# Patient Record
Sex: FEMALE | Race: WHITE | Employment: FULL TIME | URBAN - METROPOLITAN AREA
[De-identification: names, ages, dates, MRNs, and addresses within clinical notes are randomized per-mention and may not be internally consistent; named-entity substitution may affect disease eponyms.]

---

## 2019-05-19 PROBLEM — N83.291 COMPLEX CYST OF RIGHT OVARY: Status: ACTIVE | Noted: 2019-05-19

## 2019-05-19 PROBLEM — D06.9 CIN III (CERVICAL INTRAEPITHELIAL NEOPLASIA III): Status: ACTIVE | Noted: 2019-05-19

## 2019-05-19 NOTE — PROGRESS NOTES
[address] [place] [address] [address], [address] [facility] [phone]  F [phone]    Office Note  Patient ID:  Name:  Armida Goodman  MRN:  [id number]  :  1965/53 y.o. Date:  2019      HISTORY OF PRESENT ILLNESS:  Armida Goodman is a 48 y.o.  perimenopausal female who is being seen for at least sever cervical dysplasia. She is referred by Dr. Sienna Frias. Her most recent pap smear was read as HGSIL and she was high-risk HPV positive. She underwent subsequent colposcopy with biopsy. This revealed ZBIGNIEW 2-3, but an invasive process could not be excluded. On a pelvic ultrasound she was noted to have a complex 4.1 cm right adnexal mass and a 3.7 cm complex mass in the cervix. I have been asked to see her in consultation for further evaluation and management. ROS:   and GI review:  Negative  Cardiopulmonary review:  Negative   Musculoskeletal:  Negative    A comprehensive review of systems was negative except for that written in the History of Present Illness. , 10 point ROS      OB/GYN ROS:  There is no history of significant gyn problems or procedures. Problem List:  Patient Active Problem List    Diagnosis Date Noted    Malignant neoplasm of overlapping sites of cervix ([facility] [address] 2019    Complex cyst of right ovary 2019     PMH:  Past Medical History:   Diagnosis Date    Rheumatoid arthritis ([facility] [address]       PSH:  Past Surgical History:   Procedure Laterality Date    HX COLPOSCOPY  2019    HGSIL      Social History:  Social History     Tobacco Use    Smoking status: Former Smoker    Smokeless tobacco: Never Used    Tobacco comment: Patient reports she smoked in her 21's   Substance Use Topics    Alcohol use: Not on file      Family History:  History reviewed. No pertinent family history.    Medications: (reviewed)  Current Outpatient Medications   Medication Sig    naproxen (NAPROSYN) 500 mg tablet TAKE 1 TABLET BY MOUTH EVERY 8 HOURS FOR 48 HOURS     No current facility-administered medications for this visit. Allergies: (reviewed)  No Known Allergies       OBJECTIVE:    Physical Exam:  VITAL SIGNS: Vitals:    05/20/19 0900   BP: 133/81   Pulse: 80   Weight: 182 lb 12.8 oz (82.9 kg)   Height: 5' 3\" (1.6 m)     Body mass index is 32.38 kg/m². GENERAL FAUSTINA: Conversant, alert, oriented. No acute distress. HEENT: HEENT. No thyroid enlargement. No JVD. Neck: Supple without restrictions. RESPIRATORY: Clear to auscultation and percussion to the bases. No CVAT. CARDIOVASC: RRR without murmur/rub. GASTROINT: soft, non-tender, without masses or organomegaly   MUSCULOSKEL: no joint tenderness, deformity or swelling   EXTREMITIES: extremities normal, atraumatic, no cyanosis or edema   PELVIC: Vulva and distal vagina appear normal. Cervix replaced by tumor and flush with vaginal apex. Necrotic appearing with foul-smelling discharge. Palpable tumor involvement of the anterior vaginal wall, more so on the right side. Thickening of the parametrium bilaterally. Uterus not mobile. Possible nodularity along the uterosacral ligament and cul de sac. Adnexa not palpable. Exam was a bit limited due to discomfort. RECTAL: Deferred   KEIRY SURVEY: No suspicious lymphadenopathy or edema noted. NEURO: Grossly intact. No acute deficit. Lab Data:    No results found for: WBC, HGB, HCT, PLT, MCV, HGBEXT, HCTEXT, PLTEXT, HGBEXT, HCTEXT, PLTEXT  No results found for: NA, K, CL, CO2, AGAP, GLU, BUN, CREA, BUCR, GFRAA, GFRNA, CA      Imaging: Outside pelvic ultrasound reviewed. Pertinent findings noted in HPI. IMPRESSION/PLAN:  Keisha Singh is a 48 y.o. female with a biopsy diagnosis of at least ZBIGNIEW 2-3, but clearly has an invasive carcinoma on physical exam.  She also has a complex right adnexal mass. I reviewed with Keisha Singh her medical records, physical exam, and review of symptoms.   I explained to the patient and her  my clinical findings. She has at least a stage IIB cervical cancer, possibly stage IIIB. Based upon my exam she is not a candidate for a radical hysterectomy. She will need chemoradiation, unless she is found to have distant metastatic disease. I am going to send her for a PET/CT to assess for metastatic disease. I am also going to plan an exam under anesthesia with cystoscopy, proctoscopy, and another cervical biopsy to confirm the diagnosis. She was counseled on the risks, benefits, indications, and alternatives of the CKC procedure. Her questions were answered and she wishes to proceed as planned. She was quite tearful in the office today when given the news.         Signed By: Linda Hutton MD     5/20/2019/9:50 AM

## 2019-05-20 ENCOUNTER — OFFICE VISIT (OUTPATIENT)
Dept: GYNECOLOGY | Age: 54
End: 2019-05-20

## 2019-05-20 ENCOUNTER — HOSPITAL ENCOUNTER (OUTPATIENT)
Dept: PREADMISSION TESTING | Age: 54
Discharge: HOME | End: 2019-05-20
Payer: COMMERCIAL

## 2019-05-20 VITALS
HEART RATE: 76 BPM | HEIGHT: 62 IN | DIASTOLIC BLOOD PRESSURE: 85 MMHG | BODY MASS INDEX: 33.19 KG/M2 | SYSTOLIC BLOOD PRESSURE: 132 MMHG | RESPIRATION RATE: 20 BRPM | WEIGHT: 180.38 LBS | TEMPERATURE: 98.2 F

## 2019-05-20 VITALS
WEIGHT: 182.8 LBS | DIASTOLIC BLOOD PRESSURE: 81 MMHG | HEART RATE: 80 BPM | SYSTOLIC BLOOD PRESSURE: 133 MMHG | HEIGHT: 63 IN | BODY MASS INDEX: 32.39 KG/M2

## 2019-05-20 DIAGNOSIS — N83.291 COMPLEX CYST OF RIGHT OVARY: ICD-10-CM

## 2019-05-20 DIAGNOSIS — C53.8 MALIGNANT NEOPLASM OF OVERLAPPING SITES OF CERVIX (HCC): Primary | ICD-10-CM

## 2019-05-20 LAB
ALBUMIN SERPL-MCNC: 3.4 G/DL (ref 3.5–5)
ALBUMIN/GLOB SERPL: 0.8 {RATIO} (ref 1.1–2.2)
ALP SERPL-CCNC: 78 U/L (ref 45–117)
ALT SERPL-CCNC: 13 U/L (ref 12–78)
ANION GAP SERPL CALC-SCNC: 8 MMOL/L (ref 5–15)
AST SERPL-CCNC: 18 U/L (ref 15–37)
BASOPHILS # BLD: 0 K/UL (ref 0–0.1)
BASOPHILS NFR BLD: 0 % (ref 0–1)
BILIRUB SERPL-MCNC: 0.3 MG/DL (ref 0.2–1)
BUN SERPL-MCNC: 8 MG/DL (ref 6–20)
BUN/CREAT SERPL: 16 (ref 12–20)
CALCIUM SERPL-MCNC: 9 MG/DL (ref 8.5–10.1)
CHLORIDE SERPL-SCNC: 106 MMOL/L (ref 97–108)
CO2 SERPL-SCNC: 27 MMOL/L (ref 21–32)
CREAT SERPL-MCNC: 0.49 MG/DL (ref 0.55–1.02)
DIFFERENTIAL METHOD BLD: ABNORMAL
EOSINOPHIL # BLD: 0.1 K/UL (ref 0–0.4)
EOSINOPHIL NFR BLD: 2 % (ref 0–7)
ERYTHROCYTE [DISTWIDTH] IN BLOOD BY AUTOMATED COUNT: 14.8 % (ref 11.5–14.5)
GLOBULIN SER CALC-MCNC: 4.1 G/DL (ref 2–4)
GLUCOSE SERPL-MCNC: 86 MG/DL (ref 65–100)
HCT VFR BLD AUTO: 35.4 % (ref 35–47)
HGB BLD-MCNC: 11.2 G/DL (ref 11.5–16)
IMM GRANULOCYTES # BLD AUTO: 0 K/UL (ref 0–0.04)
IMM GRANULOCYTES NFR BLD AUTO: 0 % (ref 0–0.5)
LYMPHOCYTES # BLD: 1 K/UL (ref 0.8–3.5)
LYMPHOCYTES NFR BLD: 19 % (ref 12–49)
MCH RBC QN AUTO: 27.5 PG (ref 26–34)
MCHC RBC AUTO-ENTMCNC: 31.6 G/DL (ref 30–36.5)
MCV RBC AUTO: 86.8 FL (ref 80–99)
MONOCYTES # BLD: 0.4 K/UL (ref 0–1)
MONOCYTES NFR BLD: 8 % (ref 5–13)
NEUTS SEG # BLD: 3.8 K/UL (ref 1.8–8)
NEUTS SEG NFR BLD: 71 % (ref 32–75)
NRBC # BLD: 0 K/UL (ref 0–0.01)
NRBC BLD-RTO: 0 PER 100 WBC
PLATELET # BLD AUTO: 257 K/UL (ref 150–400)
PMV BLD AUTO: 10.1 FL (ref 8.9–12.9)
POTASSIUM SERPL-SCNC: 4 MMOL/L (ref 3.5–5.1)
PROT SERPL-MCNC: 7.5 G/DL (ref 6.4–8.2)
RBC # BLD AUTO: 4.08 M/UL (ref 3.8–5.2)
SODIUM SERPL-SCNC: 141 MMOL/L (ref 136–145)
WBC # BLD AUTO: 5.3 K/UL (ref 3.6–11)

## 2019-05-20 PROCEDURE — 80053 COMPREHEN METABOLIC PANEL: CPT

## 2019-05-20 PROCEDURE — 85025 COMPLETE CBC W/AUTO DIFF WBC: CPT

## 2019-05-20 RX ORDER — NAPROXEN 500 MG/1
TABLET ORAL
Refills: 0 | COMMUNITY
Start: 2019-05-08 | End: 2020-06-10

## 2019-05-20 RX ORDER — IBUPROFEN 200 MG
400 TABLET ORAL
COMMUNITY

## 2019-05-20 NOTE — PROGRESS NOTES
New patient referred by Dr. Gary Mccrary for carcinoma in situ of the cervix and right adnexal mass.

## 2019-05-20 NOTE — PATIENT INSTRUCTIONS
[employer] Activation    Thank you for requesting access to [employer]. Please follow the instructions below to securely access and download your online medical record. [employer] allows you to send messages to your doctor, view your test results, renew your prescriptions, schedule appointments, and more. How Do I Sign Up? 1. In your internet browser, go to [email or web address]. 2. Click on the First Time User? Click Here link in the Sign In box. You will see the New Member Sign Up page. 3. Enter your [employer] Access Code exactly as it appears below. You will not need to use this code after youve completed the sign-up process. If you do not sign up before the expiration date, you must request a new code. [employer] Access Code: [email or web address]  Expires: 2019  8:53 AM (This is the date your [employer] access code will )    4. Enter the last four digits of your Social Security Number (xxxx) and Date of Birth (mm/dd/yyyy) as indicated and click Submit. You will be taken to the next sign-up page. 5. Create a [employer] ID. This will be your [employer] login ID and cannot be changed, so think of one that is secure and easy to remember. 6. Create a [employer] password. You can change your password at any time. 7. Enter your Password Reset Question and Answer. This can be used at a later time if you forget your password. 8. Enter your e-mail address. You will receive e-mail notification when new information is available in [address] 9. Click Sign Up. You can now view and download portions of your medical record. 10. Click the Download Summary menu link to download a portable copy of your medical information. Additional Information    If you have questions, please visit the Frequently Asked Questions section of the [employer] website at [email or web address] Remember, [employer] is NOT to be used for urgent needs. For medical emergencies, dial 911.

## 2019-05-20 NOTE — LETTER
2019 10:20 AM 
 
Patient:  Dwayne Robert YOB: 1965 Date of Visit: 2019 Dear Ovidio Arias MD 
[address] [facility] [address] [place] [address] VIA Facsimile: [phone] 
 : Thank you for referring Ms. Dwayne Robert to me for evaluation/treatment. Below are the relevant portions of my assessment and plan of care. [address], [address] [facility], [address] 
P [phone]  F [phone] Office Note Patient ID: 
Name:  Dwayne Robert MRN:  [id number] :  1965/53 y.o. Date:  2019 HISTORY OF PRESENT ILLNESS: 
Dwayne Robert is a 48 y.o.  perimenopausal female who is being seen for at least sever cervical dysplasia. She is referred by Dr. Zac Grossman. Her most recent pap smear was read as HGSIL and she was high-risk HPV positive. She underwent subsequent colposcopy with biopsy. This revealed ZBIGNIEW 2-3, but an invasive process could not be excluded. On a pelvic ultrasound she was noted to have a complex 4.1 cm right adnexal mass and a 3.7 cm complex mass in the cervix. I have been asked to see her in consultation for further evaluation and management. ROS: 
 and GI review:  Negative Cardiopulmonary review:  Negative Musculoskeletal:  Negative A comprehensive review of systems was negative except for that written in the History of Present Illness. , 10 point ROS 
 
 
OB/GYN ROS: 
There is no history of significant gyn problems or procedures. Problem List: 
Patient Active Problem List  
 Diagnosis Date Noted  Malignant neoplasm of overlapping sites of cervix ([address] 2019  Complex cyst of right ovary 2019 PMH: 
Past Medical History:  
Diagnosis Date  Rheumatoid arthritis ([address] PSH: 
Past Surgical History:  
Procedure Laterality Date  HX COLPOSCOPY  2019 HGSIL Social History: 
Social History Tobacco Use  
  Smoking status: Former Smoker  Smokeless tobacco: Never Used  Tobacco comment: Patient reports she smoked in her 21's Substance Use Topics  Alcohol use: Not on file Family History: 
History reviewed. No pertinent family history. Medications: (reviewed) Current Outpatient Medications Medication Sig  
 naproxen (NAPROSYN) 500 mg tablet TAKE 1 TABLET BY MOUTH EVERY 8 HOURS FOR 48 HOURS No current facility-administered medications for this visit. Allergies: (reviewed) No Known Allergies OBJECTIVE: 
 
Physical Exam: VITAL SIGNS: Vitals:  
 05/20/19 0900 BP: 133/81 Pulse: 80 Weight: 182 lb 12.8 oz (82.9 kg) Height: 5' 3\" (1.6 m) Body mass index is 32.38 kg/m². GENERAL FAUSTINA: Conversant, alert, oriented. No acute distress. HEENT: HEENT. No thyroid enlargement. No JVD. Neck: Supple without restrictions. RESPIRATORY: Clear to auscultation and percussion to the bases. No CVAT. CARDIOVASC: RRR without murmur/rub. GASTROINT: soft, non-tender, without masses or organomegaly MUSCULOSKEL: no joint tenderness, deformity or swelling EXTREMITIES: extremities normal, atraumatic, no cyanosis or edema PELVIC: Vulva and distal vagina appear normal. Cervix replaced by tumor and flush with vaginal apex. Necrotic appearing with foul-smelling discharge. Palpable tumor involvement of the anterior vaginal wall, more so on the right side. Thickening of the parametrium bilaterally. Uterus not mobile. Possible nodularity along the uterosacral ligament and cul de sac. Adnexa not palpable. Exam was a bit limited due to discomfort. RECTAL: Deferred KEIRY SURVEY: No suspicious lymphadenopathy or edema noted. NEURO: Grossly intact. No acute deficit. Lab Data: 
 
No results found for: WBC, HGB, HCT, PLT, MCV, HGBEXT, HCTEXT, PLTEXT, HGBEXT, HCTEXT, PLTEXT No results found for: NA, K, CL, CO2, AGAP, GLU, BUN, CREA, BUCR, GFRAA, GFRNA, CA Imaging: Outside pelvic ultrasound reviewed. Pertinent findings noted in HPI. IMPRESSION/PLAN: 
Esteban Canela is a 48 y.o. female with a biopsy diagnosis of at least ZBIGNIEW 2-3, but clearly has an invasive carcinoma on physical exam.  She also has a complex right adnexal mass. I reviewed with Esteban Canela her medical records, physical exam, and review of symptoms. I explained to the patient and her  my clinical findings. She has at least a stage IIB cervical cancer, possibly stage IIIB. Based upon my exam she is not a candidate for a radical hysterectomy. She will need chemoradiation, unless she is found to have distant metastatic disease. I am going to send her for a PET/CT to assess for metastatic disease. I am also going to plan an exam under anesthesia with cystoscopy, proctoscopy, and another cervical biopsy to confirm the diagnosis. She was counseled on the risks, benefits, indications, and alternatives of the CKC procedure. Her questions were answered and she wishes to proceed as planned. She was quite tearful in the office today when given the news. Signed By: Kelly Stone MD   
 5/20/2019/9:50 AM  
 
 
New patient referred by Dr. Christine Francis for carcinoma in situ of the cervix and right adnexal mass. If you have questions, please do not hesitate to call me. I look forward to following Ms. Lanie along with you.  
 
 
 
Sincerely, 
 
 
Kelly Stone MD

## 2019-05-21 ENCOUNTER — HOSPITAL ENCOUNTER (OUTPATIENT)
Dept: PET IMAGING | Age: 54
Discharge: HOME | End: 2019-05-21
Attending: OBSTETRICS & GYNECOLOGY
Payer: COMMERCIAL

## 2019-05-21 VITALS — BODY MASS INDEX: 32.25 KG/M2 | WEIGHT: 182 LBS | HEIGHT: 63 IN

## 2019-05-21 DIAGNOSIS — C53.8 MALIGNANT NEOPLASM OF OVERLAPPING SITES OF CERVIX (HCC): ICD-10-CM

## 2019-05-21 DIAGNOSIS — N83.291 COMPLEX CYST OF RIGHT OVARY: ICD-10-CM

## 2019-05-21 PROCEDURE — A9552 F18 FDG: HCPCS

## 2019-05-21 RX ORDER — SODIUM CHLORIDE 0.9 % (FLUSH) 0.9 %
10 SYRINGE (ML) INJECTION
Status: COMPLETED | OUTPATIENT
Start: 2019-05-21 | End: 2019-05-21

## 2019-05-21 RX ADMIN — Medication 10 ML: at 07:56

## 2019-05-22 ENCOUNTER — HOSPITAL ENCOUNTER (OUTPATIENT)
Age: 54
Setting detail: OUTPATIENT SURGERY
Discharge: HOME | End: 2019-05-22
Attending: OBSTETRICS & GYNECOLOGY | Admitting: OBSTETRICS & GYNECOLOGY
Payer: COMMERCIAL

## 2019-05-22 ENCOUNTER — ANESTHESIA (OUTPATIENT)
Dept: SURGERY | Age: 54
End: 2019-05-22
Payer: COMMERCIAL

## 2019-05-22 ENCOUNTER — ANESTHESIA EVENT (OUTPATIENT)
Dept: SURGERY | Age: 54
End: 2019-05-22
Payer: COMMERCIAL

## 2019-05-22 VITALS
OXYGEN SATURATION: 97 % | WEIGHT: 180 LBS | HEIGHT: 62 IN | BODY MASS INDEX: 33.13 KG/M2 | RESPIRATION RATE: 11 BRPM | SYSTOLIC BLOOD PRESSURE: 113 MMHG | DIASTOLIC BLOOD PRESSURE: 72 MMHG | HEART RATE: 67 BPM | TEMPERATURE: 97.7 F

## 2019-05-22 DIAGNOSIS — C53.8 MALIGNANT NEOPLASM OF OVERLAPPING SITES OF CERVIX (HCC): Primary | ICD-10-CM

## 2019-05-22 DIAGNOSIS — G89.3 CANCER ASSOCIATED PAIN: ICD-10-CM

## 2019-05-22 LAB — HCG UR QL: NEGATIVE

## 2019-05-22 PROCEDURE — 74011250636 HC RX REV CODE- 250/636

## 2019-05-22 PROCEDURE — 74011000258 HC RX REV CODE- 258: Performed by: OBSTETRICS & GYNECOLOGY

## 2019-05-22 PROCEDURE — 76060000032 HC ANESTHESIA 0.5 TO 1 HR: Performed by: OBSTETRICS & GYNECOLOGY

## 2019-05-22 PROCEDURE — 76010000138 HC OR TIME 0.5 TO 1 HR: Performed by: OBSTETRICS & GYNECOLOGY

## 2019-05-22 PROCEDURE — 76210000016 HC OR PH I REC 1 TO 1.5 HR: Performed by: OBSTETRICS & GYNECOLOGY

## 2019-05-22 PROCEDURE — 76210000021 HC REC RM PH II 0.5 TO 1 HR: Performed by: OBSTETRICS & GYNECOLOGY

## 2019-05-22 PROCEDURE — 77030018832 HC SOL IRR H20 ICUM -A: Performed by: OBSTETRICS & GYNECOLOGY

## 2019-05-22 PROCEDURE — 77030020782 HC GWN BAIR PAWS FLX 3M -B

## 2019-05-22 PROCEDURE — 74011250636 HC RX REV CODE- 250/636: Performed by: ANESTHESIOLOGY

## 2019-05-22 PROCEDURE — 81025 URINE PREGNANCY TEST: CPT

## 2019-05-22 PROCEDURE — 74011000250 HC RX REV CODE- 250: Performed by: OBSTETRICS & GYNECOLOGY

## 2019-05-22 PROCEDURE — 77030019927 HC TBNG IRR CYSTO BAXT -A: Performed by: OBSTETRICS & GYNECOLOGY

## 2019-05-22 PROCEDURE — 77030020143 HC AIRWY LARYN INTUB CGAS -A: Performed by: ANESTHESIOLOGY

## 2019-05-22 PROCEDURE — 77030018836 HC SOL IRR NACL ICUM -A: Performed by: OBSTETRICS & GYNECOLOGY

## 2019-05-22 PROCEDURE — 88305 TISSUE EXAM BY PATHOLOGIST: CPT

## 2019-05-22 RX ORDER — DEXAMETHASONE SODIUM PHOSPHATE 4 MG/ML
INJECTION, SOLUTION INTRA-ARTICULAR; INTRALESIONAL; INTRAMUSCULAR; INTRAVENOUS; SOFT TISSUE AS NEEDED
Status: DISCONTINUED | OUTPATIENT
Start: 2019-05-22 | End: 2019-05-22 | Stop reason: HOSPADM

## 2019-05-22 RX ORDER — MIDAZOLAM HYDROCHLORIDE 1 MG/ML
INJECTION, SOLUTION INTRAMUSCULAR; INTRAVENOUS AS NEEDED
Status: DISCONTINUED | OUTPATIENT
Start: 2019-05-22 | End: 2019-05-22 | Stop reason: HOSPADM

## 2019-05-22 RX ORDER — FERRIC SUBSULFATE 20-22G/100
SOLUTION, NON-ORAL MISCELLANEOUS AS NEEDED
Status: DISCONTINUED | OUTPATIENT
Start: 2019-05-22 | End: 2019-05-22 | Stop reason: HOSPADM

## 2019-05-22 RX ORDER — OXYCODONE AND ACETAMINOPHEN 5; 325 MG/1; MG/1
1 TABLET ORAL
Qty: 30 TAB | Refills: 0 | Status: SHIPPED | OUTPATIENT
Start: 2019-05-22 | End: 2019-05-25

## 2019-05-22 RX ORDER — ONDANSETRON 2 MG/ML
INJECTION INTRAMUSCULAR; INTRAVENOUS AS NEEDED
Status: DISCONTINUED | OUTPATIENT
Start: 2019-05-22 | End: 2019-05-22 | Stop reason: HOSPADM

## 2019-05-22 RX ORDER — LIDOCAINE HYDROCHLORIDE 20 MG/ML
INJECTION, SOLUTION EPIDURAL; INFILTRATION; INTRACAUDAL; PERINEURAL AS NEEDED
Status: DISCONTINUED | OUTPATIENT
Start: 2019-05-22 | End: 2019-05-22 | Stop reason: HOSPADM

## 2019-05-22 RX ORDER — SODIUM CHLORIDE, SODIUM LACTATE, POTASSIUM CHLORIDE, CALCIUM CHLORIDE 600; 310; 30; 20 MG/100ML; MG/100ML; MG/100ML; MG/100ML
25 INJECTION, SOLUTION INTRAVENOUS CONTINUOUS
Status: DISCONTINUED | OUTPATIENT
Start: 2019-05-22 | End: 2019-05-22 | Stop reason: HOSPADM

## 2019-05-22 RX ORDER — PROPOFOL 10 MG/ML
INJECTION, EMULSION INTRAVENOUS AS NEEDED
Status: DISCONTINUED | OUTPATIENT
Start: 2019-05-22 | End: 2019-05-22 | Stop reason: HOSPADM

## 2019-05-22 RX ADMIN — MIDAZOLAM HYDROCHLORIDE 2 MG: 1 INJECTION, SOLUTION INTRAMUSCULAR; INTRAVENOUS at 13:26

## 2019-05-22 RX ADMIN — PROPOFOL 200 MG: 10 INJECTION, EMULSION INTRAVENOUS at 13:34

## 2019-05-22 RX ADMIN — CEFOTETAN DISODIUM 2 G: 2 INJECTION, POWDER, FOR SOLUTION INTRAMUSCULAR; INTRAVENOUS at 13:37

## 2019-05-22 RX ADMIN — LIDOCAINE HYDROCHLORIDE 100 MG: 20 INJECTION, SOLUTION EPIDURAL; INFILTRATION; INTRACAUDAL; PERINEURAL at 13:34

## 2019-05-22 RX ADMIN — ONDANSETRON 4 MG: 2 INJECTION INTRAMUSCULAR; INTRAVENOUS at 13:40

## 2019-05-22 RX ADMIN — SODIUM CHLORIDE, SODIUM LACTATE, POTASSIUM CHLORIDE, AND CALCIUM CHLORIDE 25 ML/HR: 600; 310; 30; 20 INJECTION, SOLUTION INTRAVENOUS at 12:12

## 2019-05-22 RX ADMIN — DEXAMETHASONE SODIUM PHOSPHATE 8 MG: 4 INJECTION, SOLUTION INTRA-ARTICULAR; INTRALESIONAL; INTRAMUSCULAR; INTRAVENOUS; SOFT TISSUE at 13:40

## 2019-05-22 NOTE — BRIEF OP NOTE
BRIEF OPERATIVE NOTE    Date of Procedure: 5/22/2019   Preoperative Diagnosis: CERVICAL CANCER  Postoperative Diagnosis: CERVICAL CANCER    Procedure(s): EXAM UNDER ANESTHESIA, CYSTOSCOPY, PROCTOSCOPY, CERVICAL BIOPSY  Surgeon(s) and Role:     Hernandez Morillo MD - Primary         Surgical Assistant: None    Surgical Staff:  Circ-1: Martín Fournier RN  Circ-2: All Cortes RN  Scrub Tech-1: Jackelinea Herb  Event Time In Time Out   Incision Start 1346    Incision Close       Anesthesia: General   Estimated Blood Loss: 25 cc  Specimens:   ID Type Source Tests Collected by Time Destination   1 : cervical biopsy Fresh Cervical  Garrick Suarez MD 5/22/2019 1356 Pathology      Findings: Replacement of cervix with carcinoma, with extension into proximal anterior vagina. Parametrial thickening bilaterally. Uterus still somewhat mobile. No appreciable disease in the bladder or rectum.      Complications: None  Implants: * No implants in log *    Lauren Deleon MD

## 2019-05-22 NOTE — H&P
[address] [facility] [address], [address]   [phone]  F [phone]    Office Note  Patient ID:  Name:  Jena Deutsch  MRN:  [id number]  :  1965/53 y.o. Date:  2019      HISTORY OF PRESENT ILLNESS:  Jena Deutsch is a 48 y.o.  perimenopausal female who is being seen for at least sever cervical dysplasia. She is referred by Dr. Donna Anne. Her most recent pap smear was read as HGSIL and she was high-risk HPV positive. She underwent subsequent colposcopy with biopsy. This revealed ZBIGNIEW 2-3, but an invasive process could not be excluded. On a pelvic ultrasound she was noted to have a complex 4.1 cm right adnexal mass and a 3.7 cm complex mass in the cervix. I have been asked to see her in consultation for further evaluation and management. ROS:   and GI review:  Negative  Cardiopulmonary review:  Negative   Musculoskeletal:  Negative    A comprehensive review of systems was negative except for that written in the History of Present Illness. , 10 point ROS      OB/GYN ROS:  There is no history of significant gyn problems or procedures.       Problem List:  Patient Active Problem List    Diagnosis Date Noted    Malignant neoplasm of overlapping sites of cervix ([address] 2019    Complex cyst of right ovary 2019     PMH:  Past Medical History:   Diagnosis Date    Cancer ([address] 2019    CEVICAL    Rheumatoid arthritis ([address]       PSH:  Past Surgical History:   Procedure Laterality Date    HX  SECTION  1997    HX COLPOSCOPY  2019    HGSIL      Social History:  Social History     Tobacco Use    Smoking status: Former Smoker     Packs/day: 0.50     Years: 2.00     Pack years: 1.00     Last attempt to quit: 1991     Years since quittin.0    Smokeless tobacco: Never Used   Substance Use Topics    Alcohol use: Yes     Comment: OCCASIONALLY      Family History:  Family History   Problem Relation Age of Onset    No Known Problems Mother     Other Father         ACCIDENTAL DEATH    Thyroid Disease Brother     Anesth Problems Neg Hx       Medications: (reviewed)  No current facility-administered medications for this encounter. Current Outpatient Medications   Medication Sig    naproxen (NAPROSYN) 500 mg tablet TAKE 1 TABLET BY MOUTH EVERY 8 HOURS FOR 48 HOURS. TAKING MED AS NEEDED    ibuprofen (ADVIL) 200 mg tablet Take 400 mg by mouth every eight (8) hours as needed for Pain. Allergies: (reviewed)  No Known Allergies       OBJECTIVE:    Physical Exam:  VITAL SIGNS: There were no vitals filed for this visit. There is no height or weight on file to calculate BMI. GENERAL FAUSTINA: Conversant, alert, oriented. No acute distress. HEENT: HEENT. No thyroid enlargement. No JVD. Neck: Supple without restrictions. RESPIRATORY: Clear to auscultation and percussion to the bases. No CVAT. CARDIOVASC: RRR without murmur/rub. GASTROINT: soft, non-tender, without masses or organomegaly   MUSCULOSKEL: no joint tenderness, deformity or swelling   EXTREMITIES: extremities normal, atraumatic, no cyanosis or edema   PELVIC: Vulva and distal vagina appear normal. Cervix replaced by tumor and flush with vaginal apex. Necrotic appearing with foul-smelling discharge. Palpable tumor involvement of the anterior vaginal wall, more so on the right side. Thickening of the parametrium bilaterally. Uterus not mobile. Possible nodularity along the uterosacral ligament and cul de sac. Adnexa not palpable. Exam was a bit limited due to discomfort. RECTAL: Deferred   KEIRY SURVEY: No suspicious lymphadenopathy or edema noted. NEURO: Grossly intact. No acute deficit.        Lab Data:    Lab Results   Component Value Date/Time    WBC 5.3 05/20/2019 12:14 PM    HGB 11.2 (L) 05/20/2019 12:14 PM    HCT 35.4 05/20/2019 12:14 PM    PLATELET 112 31/75/7133 12:14 PM    MCV 86.8 05/20/2019 12:14 PM     Lab Results   Component Value Date/Time    Sodium 141 05/20/2019 12:14 PM    Potassium 4.0 05/20/2019 12:14 PM    Chloride 106 05/20/2019 12:14 PM    CO2 27 05/20/2019 12:14 PM    Anion gap 8 05/20/2019 12:14 PM    Glucose 86 05/20/2019 12:14 PM    BUN 8 05/20/2019 12:14 PM    Creatinine 0.49 (L) 05/20/2019 12:14 PM    BUN/Creatinine ratio 16 05/20/2019 12:14 PM    GFR est AA >60 05/20/2019 12:14 PM    GFR est non-AA >60 05/20/2019 12:14 PM    Calcium 9.0 05/20/2019 12:14 PM         Imaging: Outside pelvic ultrasound reviewed. Pertinent findings noted in HPI.         PET/CT (5/21/19)  HEAD/NECK: There is 1.8 cm left level 4 lymph node with increased metabolic  activity of 11.     CHEST: No foci of abnormal hypermetabolism. Low-grade activity in a normal right  axillary lymph node is most likely physiologic.     ABDOMEN/PELVIS: There is a 1 cm retrocrural lymph node on the right with  increased metabolic activity of 5.      There is periaortic adenopathy measuring 2 cm with increased metabolic activity  of 9.     There is an enlarged lymph node at the aortic bifurcation with increased  metabolic activity.       There is a complex appearing mass right pelvic sidewall measuring 3.6 cm with  increased metabolic activity of 64.0. There is left iliac adenopathy with increased metabolic activity of 5.7.     There is a 1.4 cm soft tissue nodule intraperitoneal left lower quadrant with  increased metabolic activity of 7.     There is a mass in the cervix with increased metabolic activity of 12  There is right hydronephrosis.     SKELETON: No foci of abnormal hypermetabolism in the axial and visualized  appendicular skeleton.     IMPRESSION:   There is increased metabolic activity in a cervical mass consistent with  known primary neoplasm.    There is adenopathy involving right pelvic sidewall, left iliac chain,  para-aortic chain, intra-abdominal nodule left lower quadrant, retrocrural lymph  node and left supra clavicular lymph node with increased metabolic activity  consistent with metastatic disease. The cervical mass appears to be obstructing  the right ureter resulting in moderate right hydroureteronephrosis. IMPRESSION/PLAN:  Dwayne Robert is a 48 y.o. female with a biopsy diagnosis of at least ZBIGNIEW 2-3, but clearly has an invasive carcinoma on physical exam.  She also has a complex right adnexal mass. I reviewed with Dwayne Robert her medical records, physical exam, and review of symptoms. I explained to the patient and her  my clinical findings. On clinical exam she has at least a stage IIB cervical cancer, possibly stage IIIB. Based upon my exam she is not a candidate for a radical hysterectomy. I felt she would likely need chemoradiation, unless she is found to have distant metastatic disease. 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 However, she also had a positive supraclavicular node, making her stage IV. I am also going to plan an exam under anesthesia with cystoscopy, proctoscopy, and another cervical biopsy to confirm the diagnosis and to obtain tissue for tumor testing, to see if she might be a candidate for immunotherapy with pembrolizumab. She was counseled on the risks, benefits, indications, and alternatives of the CKC procedure. Her questions were answered and she wishes to proceed as planned. Signed By: Navjot Malone MD     5/22/2019/9:50 AM     Date of Surgery Update:  Dwayne Robert was seen and examined. History and physical has been reviewed. Significant clinical changes have occurred as noted:  Her PET/CT demonstrates distant metastases, making her stage IV, where we initially thought she might be stage II or III.       Signed By: Navjot Malone MD     May 22, 2019 7:17 AM

## 2019-05-22 NOTE — OP NOTES
Gynecologic Oncology Operative Report    Misael Mcgee  5/22/2019    Pre-operative dx:  Cervical cancer    Post-operative dx:  Cervical cancer    Procedure:  Exam under anesthesia, cystoscopy, proctoscopy, cervical biopsy    Surgeon:  Tashia Logan MD    Assistant:  None     Anesthesia:  GETA    EBL:  25 cc    Complications:  None    Implants:  None    Specimens:    1 : cervical biopsy Fresh Cervical   Joseph Anthony MD 5/22/2019 1356 Pathology     Operative indications:  47 yo  female referred for at least cervical CIS on biopsy, but there was suspicion for invasion. On exam in the office she had what appeared to be an obvious invasive carcinoma with replacement of the cervix. I recommended exam under anesthesia with biopsy, cystoscopy, and proctoscopy to assess the extent of disease. Operative findings:  Replacement of cervix with carcinoma, with extension into proximal anterior vagina. Parametrial thickening bilaterally. Uterus still somewhat mobile. No appreciable disease in the bladder or rectum. Procedure in detail:  After the risks, benefits, indications, and alternatives of the procedure were discussed with the patient and informed consent was obtained, the patient was taken to the operating room. She was positively identified, administered general anesthesia, and then placed in the dorsal lithotomy position in [address] [place] [address]. An exam under anesthesia was performed. The above mentioned findings were noted. She was then prepped and draped in the usual fashion. We then performed cystoscopy using a 70 degree diagnostic scope. No lesions were noted in the bladder. The mucosa was smooth, but there was some mass effect noted. Flow was noted from the left ureter, but not confirmed on the right. No biopsy was performed. The bladder was drained and the cystoscope was removed. An open-sided Graves speculum was then placed in the vagina in order to visualize the cervix.   The above mentioned findings were noted. Multiple biopsies were taken using the Kevorkian biopsy forceps. To control the bleeding, Monsel's solution was applied liberally to the cervix. The speculum was then removed. We then performed proctoscopy. The proctoscope was inserted and the rectum was insufflated. The scope was advanced to 15 cm. Careful inspection was performed of the anterior rectum on withdrawal of the scope. No lesions were visualized. The patient was taken out of stirrups, awakened from anesthesia, and taken to the recovery room in stable condition. All instrument, sponge, and needle counts were correct.         Demi Claudio MD  5/22/2019  2:07 PM

## 2019-05-22 NOTE — ANESTHESIA PREPROCEDURE EVALUATION
Relevant Problems   No relevant active problems       Anesthetic History   No history of anesthetic complications            Review of Systems / Medical History  Patient summary reviewed, nursing notes reviewed and pertinent labs reviewed    Pulmonary  Within defined limits                 Neuro/Psych   Within defined limits           Cardiovascular  Within defined limits                     GI/Hepatic/Renal  Within defined limits              Endo/Other  Within defined limits           Other Findings              Physical Exam    Airway  Mallampati: II  TM Distance: > 6 cm  Neck ROM: normal range of motion   Mouth opening: Normal     Cardiovascular  Regular rate and rhythm,  S1 and S2 normal,  no murmur, click, rub, or gallop             Dental  No notable dental hx       Pulmonary  Breath sounds clear to auscultation               Abdominal  GI exam deferred       Other Findings            Anesthetic Plan    ASA: 2  Anesthesia type: general          Induction: Intravenous  Anesthetic plan and risks discussed with: Patient

## 2019-05-22 NOTE — DISCHARGE INSTRUCTIONS
Patient Education        Cystoscopy: What to Expect at [facility]    A cystoscopy is a procedure that lets a doctor look inside of the bladder and the urethra. The urethra is the tube that carries urine from the bladder to outside the body. The doctor uses a thin, lighted tool called a cystoscope. Your bladder is filled with fluid. This stretches the bladder so that your doctor can look closely at the inside of your bladder. After the cystoscopy, your urethra may be sore at first, and it may burn when you urinate for the first few days after the procedure. You may feel the need to urinate more often, and your urine may be pink. These symptoms should get better in 1 or 2 days. You will probably be able to go back to most of your usual activities in 1 or 2 days. This care sheet gives you a general idea about how long it will take for you to recover. But each person recovers at a different pace. Follow the steps below to get better as quickly as possible. How can you care for yourself at home? Activity    · Rest when you feel tired. Getting enough sleep will help you recover.     · Try to walk each day. Start by walking a little more than you did the day before. Bit by bit, increase the amount you walk. Walking boosts blood flow and helps prevent pneumonia and constipation.     · Avoid strenuous activities, such as bicycle riding, jogging, weight lifting, or aerobic exercise, until your doctor says it is okay.     · Ask your doctor when you can drive again.     · Most people are able to return to work within 1 or 2 days after the procedure.     · You may shower and take baths as usual.     · Ask your doctor when it is okay for you to have sex. Diet    · You can eat your normal diet. If your stomach is upset, try bland, low-fat foods like plain rice, broiled chicken, toast, and yogurt.     · Drink plenty of fluids (unless your doctor tells you not to).    Medicines    · Take pain medicines exactly as directed. ? If the doctor gave you a prescription medicine for pain, take it as prescribed. ? If you are not taking a prescription pain medicine, ask your doctor if you can take an over-the-counter medicine.     · If you think your pain medicine is making you sick to your stomach:  ? Take your medicine after meals (unless your doctor has told you not to). ? Ask your doctor for a different pain medicine.     · If your doctor prescribed antibiotics, take them as directed. Do not stop taking them just because you feel better. You need to take the full course of antibiotics. Follow-up care is a key part of your treatment and safety. Be sure to make and go to all appointments, and call your doctor if you are having problems. It's also a good idea to know your test results and keep a list of the medicines you take. When should you call for help? Call 911 anytime you think you may need emergency care. For example, call if:    · You passed out (lost consciousness).     · You have severe trouble breathing.     · You have sudden chest pain and shortness of breath, or you cough up blood.     · You have severe belly pain.    Call your doctor now or seek immediate medical care if:    · You are sick to your stomach or cannot keep fluids down.     · Your urine is still red or you see blood clots after you have urinated several times.     · You have trouble passing urine or stool, especially if you have pain or swelling in your lower belly.     · You have signs of a blood clot, such as:  ? Pain in your calf, back of the knee, thigh, or groin. ? Redness and swelling in your leg or groin.     · You develop a fever or severe chills.     · You have pain in your back just below your rib cage. This is called flank pain.    Watch closely for changes in your health, and be sure to contact your doctor if:    · You have pain or burning when you urinate.  A burning feeling is normal for a day or two after the test, but call if it does not get better.     · You have a frequent urge to urinate but can pass only small amounts of urine.     · Your urine is pink, red, or cloudy, or smells bad. It is normal for the urine to have a pinkish color for a few days after the test, but call if it does not get better. Where can you learn more? Go to http://jonnathan-fartun.info/. Enter V118 in the search box to learn more about \"Cystoscopy: What to Expect at Home. \"  Current as of: March 20, 2018  Content Version: 11.9  © 6577-6389 [employer]. Care instructions adapted under license by [employer] (which disclaims liability or warranty for this information). If you have questions about a medical condition or this instruction, always ask your healthcare professional. [address] any warranty or liability for your use of this information. Patient Education        Cone Biopsy: What to Expect at Home  Your Recovery  After your surgery, it is normal to feel tired for a couple days. You may have some pain or cramps in your lower belly for several days. Usually over-the-counter pain medicines, such as ibuprofen, are enough to help with the pain. After a cone biopsy, you will probably be able to go back to work or your normal routine in about 1 or 2 days. This care sheet gives you a general idea about how long it will take for you to recover. But each person recovers at a different pace. Follow the steps below to get better as quickly as possible. How can you care for yourself at home? Activity    · Rest when you feel tired. Getting enough sleep will help you recover.     · Try to walk each day. Walking boosts blood flow and helps prevent pneumonia and constipation.     · You may shower 24 to 48 hours after surgery, if your doctor okays it. Do not take a bath for the first 2 weeks, or until your doctor tells you it is okay.     · You will have some light vaginal bleeding or discharge.  This usually lasts for up to a week or two after surgery. Wear sanitary pads if needed. Do not douche or use tampons.     · You will probably need to take 1 or 2 days off work. It depends on the type of work you do and how you feel.     · Do not have sex or place anything in your vagina for 4 to 6 weeks after surgery, or until your doctor tells you it is okay. Diet    · You can eat your normal diet. If your stomach is upset, try bland, low-fat foods like plain rice, broiled chicken, toast, and yogurt.     · Drink plenty of fluids (unless your doctor tells you not to).     · You may notice that your bowel movements are not regular right after your surgery. This is common. Try to avoid constipation and straining with bowel movements. You may want to take a fiber supplement every day. If you have not had a bowel movement after a couple of days, ask your doctor about taking a mild laxative. Medicines    · Your doctor will tell you if and when you can restart your medicines. He or she will also give you instructions about taking any new medicines.     · If you take blood thinners, such as warfarin (Coumadin), clopidogrel (Plavix), or aspirin, be sure to talk to your doctor. He or she will tell you if and when to start taking those medicines again. Make sure that you understand exactly what your doctor wants you to do.     · Take pain medicines exactly as directed. ? If the doctor gave you a prescription medicine for pain, take it as prescribed. ? If you are not taking a prescription pain medicine, ask your doctor if you can take an over-the-counter medicine.     · If you think your pain medicine is making you sick to your stomach:  ? Take your medicine after meals (unless your doctor tells you not to). ? Ask your doctor for a different pain medicine.     · If your doctor prescribed antibiotics, take them as directed. Do not stop taking them just because you feel better. You need to take the full course of antibiotics. Other instructions    · If you have cramps after your surgery, try placing a hot water bottle or heating pad on your lower belly. Follow-up care is a key part of your treatment and safety. Be sure to make and go to all appointments, and call your doctor if you are having problems. It's also a good idea to know your test results and keep a list of the medicines you take. When should you call for help? Call 911 anytime you think you may need emergency care. For example, call if:    · You passed out (lost consciousness).     · You have chest pain, are short of breath, or cough up blood.    Call your doctor now or seek immediate medical care if:    · You have bright red vaginal bleeding that soaks one or more pads in an hour, or you have large clots.     · You have vaginal discharge that has increased in amount or smells bad.     · You are sick to your stomach or cannot drink fluids.     · You have pain that does not get better after you take pain medicine.     · You have signs of infection, such as:  ? Increased pain, swelling, warmth, or redness. ? A fever.     · You have signs of a blood clot in your leg (called a deep vein thrombosis), such as:  ? Pain in your calf, back of the knee, thigh, or groin. ? Redness or swelling in your leg or groin.     · You cannot pass stools or gas.    Watch closely for changes in your health, and be sure to contact your doctor if you have any problems. Where can you learn more? Go to http://jonnathan-fartun.info/. Enter [phone] in the search box to learn more about \"Cone Biopsy: What to Expect at Home. \"  Current as of: March 27, 2018  Content Version: 11.9  © 0666-7361 [employer]. Care instructions adapted under license by [employer] (which disclaims liability or warranty for this information).  If you have questions about a medical condition or this instruction, always ask your healthcare professional. [address] any warranty or liability for your use of this information.                Instructions Following Ambulatory Surgery    Activity  · As tolerated and directed by your doctor  · Bathe or shower as directed by your doctor    Diet  · Clear liquids until no nausea or vomiting; then light diet for the first day  · Advance to regular diet on second day, unless your doctor orders otherwise  · If nausea and vomiting continues, call your doctor    Pain  · Take pain medication as directed by your doctor  ·  Call your doctor if pain is NOT relieved by medication  · DO NOT take aspirin or blood thinners until directed by your doctor      Follow-Up Phone Calls  · Will be made nursing staff  · If you have any problems, call your doctor as needed    Call your doctor if  · Excessive bleeding that does not stop after holding mild pressure over the area  · Temperature of 101 degrees F or above  · Redness,excessive swelling or bruising, and/or green or yellow, smelly discharge from incision    After Anesthesia  · For the first 24 hours: DO NOT Drive, Drink alcoholic beverages, or Make important decisions  · Be aware of dizziness following anesthesia and while taking pain medication

## 2019-05-23 NOTE — ANESTHESIA POSTPROCEDURE EVALUATION
Post-Anesthesia Evaluation and Assessment    Patient: Matt Rudolph MRN: [id number]  SSN: [id number]    YOB: 1965  Age: 48 y.o. Sex: female      I have evaluated the patient and they are stable and ready for discharge from the PACU. Cardiovascular Function/Vital Signs  Visit Vitals  /72   Pulse 67   Temp 36.5 °C (97.7 °F)   Resp 11   Ht 5' 2\" (1.575 m)   Wt 81.6 kg (180 lb)   SpO2 97%   BMI 32.92 kg/m²       Patient is status post General anesthesia for Procedure(s):  EXAM UNDER ANESTHESIA/ CYSTOSCOPY/PROCTOSCOPY/ CERVICAL BIOPSY. Nausea/Vomiting: None    Postoperative hydration reviewed and adequate. Pain:  Pain Scale 1: Numeric (0 - 10) (05/22/19 1506)  Pain Intensity 1: 0 (05/22/19 1506)   Managed    Neurological Status:   Neuro (WDL): Within Defined Limits (05/22/19 1506)  Neuro  LUE Motor Response: Purposeful (05/22/19 1506)  LLE Motor Response: Purposeful (05/22/19 1506)  RUE Motor Response: Purposeful (05/22/19 1506)  RLE Motor Response: Purposeful (05/22/19 1506)   At baseline    Mental Status, Level of Consciousness: Alert and  oriented to person, place, and time    Pulmonary Status:   O2 Device: Room air (05/22/19 1515)   Adequate oxygenation and airway patent    Complications related to anesthesia: None    Post-anesthesia assessment completed. No concerns    Signed By: Wendy Montes MD     May 23, 2019              Procedure(s):  EXAM UNDER ANESTHESIA/ CYSTOSCOPY/PROCTOSCOPY/ CERVICAL BIOPSY. general    <BSHSIANPOST>    Vitals Value Taken Time   /72 5/22/2019  3:15 PM   Temp 36.5 °C (97.7 °F) 5/22/2019  2:17 PM   Pulse 69 5/22/2019  3:26 PM   Resp 12 5/22/2019  3:26 PM   SpO2 97 % 5/22/2019  3:26 PM   Vitals shown include unvalidated device data.

## 2019-05-24 ENCOUNTER — TELEPHONE (OUTPATIENT)
Dept: GYNECOLOGY | Age: 54
End: 2019-05-24

## 2019-05-24 NOTE — TELEPHONE ENCOUNTER
called to be sure  receives the patients pathology results. He stated that it is invasive carcinoma.

## 2019-05-30 ENCOUNTER — TELEPHONE (OUTPATIENT)
Dept: GYNECOLOGY | Age: 54
End: 2019-05-30

## 2019-05-30 NOTE — TELEPHONE ENCOUNTER
The patient called back. I advised her of the pathology results of invasive carcinoma which  noted in his progress note that he suspected and was discussed with the patient. I advised that she will have to discuss further with  at her office visit on Tuesday. She became very anxious and tearful. She asked if another physician could call her to answer a few questions before the weekend.

## 2019-05-30 NOTE — TELEPHONE ENCOUNTER
We rescheduled today because Marques's out sick. Patient wants someone to call with path results today.

## 2019-06-04 ENCOUNTER — OFFICE VISIT (OUTPATIENT)
Dept: GYNECOLOGY | Age: 54
End: 2019-06-04

## 2019-06-04 VITALS
DIASTOLIC BLOOD PRESSURE: 82 MMHG | SYSTOLIC BLOOD PRESSURE: 118 MMHG | HEIGHT: 62 IN | HEART RATE: 77 BPM | BODY MASS INDEX: 32.68 KG/M2 | WEIGHT: 177.6 LBS

## 2019-06-04 DIAGNOSIS — C53.8 MALIGNANT NEOPLASM OF OVERLAPPING SITES OF CERVIX (HCC): Primary | ICD-10-CM

## 2019-06-04 NOTE — PERIOP NOTES
PT RECENTLY HAD SURGERY (PAT APPT WAS 5/20/19), SHE LIVES OUT OF TOWN AND WHEN SHE WAS CALLED FOR PAT PHONE INTERVIEW, SHE STATED SHE WAS DRIVING HOME. SHE DENIES ANY CHANGES TO HER HEALTH HX OR PTA MEDICATIONS. SHE IS NOT FEELING WELL AND SAW HER SURGEON THIS MORNING. SHE WILL BE CALLED AT 4:30 TO GIVE CHG SOAP DIRECTIONS. INFO GATHERED FROM LAST PAT INTERVIEW TO UPDATE CHART.

## 2019-06-04 NOTE — PROGRESS NOTES
Post operative follow up. Patient states she is not feeling well. Pt c/o frequent urination, burning after urination and pain in lower abdomen/back. Vannessa Stinson

## 2019-06-04 NOTE — PROGRESS NOTES
[address] [place] [address] [address]  P [phone]  F [phone]    Office Note  Patient ID:  Name:  Dequan Martinez  MRN:  [id number]  :  1965/53 y.o. Date:  2019      HISTORY OF PRESENT ILLNESS:  Dequan Martinez is a 48 y.o.  perimenopausal female who is being seen for at least sever cervical dysplasia. She is referred by Dr. Lyssa Whitaker. Her most recent pap smear was read as HGSIL and she was high-risk HPV positive. She underwent subsequent colposcopy with biopsy. This revealed ZBIGNIEW 2-3, but an invasive process could not be excluded. On a pelvic ultrasound she was noted to have a complex 4.1 cm right adnexal mass and a 3.7 cm complex mass in the cervix. I have been asked to see her in consultation for further evaluation and management. I recommended an exam under anesthesia with cervical biopsy, cystoscopy, proctoscopy. This was performed on 19. Operative findings:  Replacement of cervix with carcinoma, with extension into proximal anterior vagina.  Parametrial thickening bilaterally.  Uterus still somewhat mobile.  No appreciable disease in the bladder or rectum. FINAL PATHOLOGIC DIAGNOSIS   Cervix, biopsy:   Invasive squamous cell carcinoma   Comment   The case is also seen by Dr. Jorene Schaumann who concurs with the findings. The results are communicated with Dr. Valencia Brought nurse, [place] at approximately 1:30pm on 2019. Following the procedure and confirmation of the diagnosis of cancer, I sent her for a PET/CT. She presents today to review those results and to discuss treatment. ROS:   and GI review:  Negative  Cardiopulmonary review:  Negative   Musculoskeletal:  Negative    A comprehensive review of systems was negative except for that written in the History of Present Illness. , 10 point ROS      OB/GYN ROS:  There is no history of significant gyn problems or procedures.       Problem List:  Patient Active Problem List    Diagnosis Date Noted    Malignant neoplasm of overlapping sites of cervix ([facility] [address] 2019    Complex cyst of right ovary 2019     PMH:  Past Medical History:   Diagnosis Date    [facility] 2019    CEVICAL    Rhe[facility]oid arthritis ([facility]       PSH:  Past Surgical History:   Procedure Laterality Date    HX  SECTION  1997    HX COLPOSCOPY  2019    HGSIL      Social History:  Social History     Tobacco Use    Smoking status: Former Smoker     Packs/day: 0.50     Years: 2.00     Pack years: 1.00     Last attempt to quit: 1991     Years since quittin.0    Smokeless tobacco: Never Used   Substance Use Topics    Alcohol use: Yes     Comment: OCCASIONALLY      Family History:  Family History   Problem Relation Age of Onset    No Known Problems Mother     Other Father         ACCIDENTAL DEATH    Thyroid Disease Brother     Anesth Problems Neg Hx       Medications: (reviewed)  Current Outpatient Medications   Medication Sig    ibuprofen (ADVIL) 200 mg tablet Take 400 mg by mouth every eight (8) hours as needed for Pain.  OTHER \"MANY VITAMINS AND SUPPLEMENTS\"    naproxen (NAPROSYN) 500 mg tablet TAKE 1 TABLET BY MOUTH EVERY 8 HOURS FOR 48 HOURS. TAKING MED AS NEEDED     No current facility-administered medications for this visit. Allergies: (reviewed)  No Known Allergies       OBJECTIVE:    Physical Exam:  VITAL SIGNS: Vitals:    19 1054   BP: 118/82   Pulse: 77   Weight: 177 lb 9.6 oz (80.6 kg)   Height: 5' 2.01\" (1.575 m)     Body mass index is 32.48 kg/m². GENERAL FAUSTINA: Conversant, alert, oriented. No acute distress. HEENT: HEENT. No thyroid enlargement. No JVD. Neck: Supple without restrictions. RESPIRATORY: Clear to auscultation and percussion to the bases. No CVAT. CARDIOVASC: RRR without murmur/rub.    GASTROINT: soft, non-tender, without masses or organomegaly   MUSCULOSKEL: no joint tenderness, deformity or swelling   EXTREMITIES: extremities normal, atraumatic, no cyanosis or edema   PELVIC: Deferred   RECTAL: Deferred   KEIRY SURVEY: No suspicious lymphadenopathy or edema noted. NEURO: Grossly intact. No acute deficit. Lab Data:    Lab Results   Component Value Date/Time    WBC 5.3 05/20/2019 12:14 PM    HGB 11.2 (L) 05/20/2019 12:14 PM    HCT 35.4 05/20/2019 12:14 PM    PLATELET 200 34/76/2646 12:14 PM    MCV 86.8 05/20/2019 12:14 PM     Lab Results   Component Value Date/Time    Sodium 141 05/20/2019 12:14 PM    Potassium 4.0 05/20/2019 12:14 PM    Chloride 106 05/20/2019 12:14 PM    CO2 27 05/20/2019 12:14 PM    Anion gap 8 05/20/2019 12:14 PM    Glucose 86 05/20/2019 12:14 PM    BUN 8 05/20/2019 12:14 PM    Creatinine 0.49 (L) 05/20/2019 12:14 PM    BUN/Creatinine ratio 16 05/20/2019 12:14 PM    GFR est AA >60 05/20/2019 12:14 PM    GFR est non-AA >60 05/20/2019 12:14 PM    Calcium 9.0 05/20/2019 12:14 PM         Imaging: Outside pelvic ultrasound reviewed. Pertinent findings noted in HPI.         PET/CT (5/21/19)  HEAD/NECK: There is 1.8 cm left level 4 lymph node with increased metabolic  activity of 11.     CHEST: No foci of abnormal hypermetabolism. Low-grade activity in a normal right  axillary lymph node is most likely physiologic.     ABDOMEN/PELVIS: There is a 1 cm retrocrural lymph node on the right with  increased metabolic activity of 5.      There is periaortic adenopathy measuring 2 cm with increased metabolic activity  of 9.     There is an enlarged lymph node at the aortic bifurcation with increased  metabolic activity.       There is a complex appearing mass right pelvic sidewall measuring 3.6 cm with  increased metabolic activity of 78.1.   There is left iliac adenopathy with increased metabolic activity of 5.7.     There is a 1.4 cm soft tissue nodule intraperitoneal left lower quadrant with  increased metabolic activity of 7.     There is a mass in the cervix with increased metabolic activity of 12  There is right hydronephrosis.     SKELETON: No foci of abnormal hypermetabolism in the axial and visualized  appendicular skeleton.     IMPRESSION:   There is increased metabolic activity in a cervical mass consistent with  known primary neoplasm. There is adenopathy involving right pelvic sidewall, left iliac chain,  para-aortic chain, intra-abdominal nodule left lower quadrant, retrocrural lymph  node and left supra clavicular lymph node with increased metabolic activity  consistent with metastatic disease. The cervical mass appears to be obstructing  the right ureter resulting in moderate right hydroureteronephrosis. IMPRESSION/PLAN:  Kevan Pretty is a 48 y.o. 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 I reviewed with Kevan Pretty her medical records, physical exam, and review of symptoms. We discussed the pathology and I reviewed the PET images with her and her . I explained that she has metastatic disease and she is not a candidate for surgical resection or curative radiation therapy. I discussed with them that the standard of care would be systemic chemotherapy, consisting of Taxol/Cisplatin/Avastin. She was counseled on the expected side effects and potential toxicities of the regimen. Her questions were answered and she wishes to proceed. If she has a good response, we may reconsider pelvic radiation for local control. It may also be necessary for control of bleeding, if that becomes a significant issue. To facilitate chemotherapy, we will proceed with IV port placement ASAP. She was counseled on the risks, benefits, indications, and alternatives of surgery. Her questions were answered and she wishes to proceed as planned.       Signed By: Hudson Shannon MD     6/4/2019/9:50 AM

## 2019-06-06 ENCOUNTER — TELEPHONE (OUTPATIENT)
Dept: GYNECOLOGY | Age: 54
End: 2019-06-06

## 2019-06-06 DIAGNOSIS — C53.8 MALIGNANT NEOPLASM OF OVERLAPPING SITES OF CERVIX (HCC): ICD-10-CM

## 2019-06-06 DIAGNOSIS — N83.291 COMPLEX CYST OF RIGHT OVARY: Primary | ICD-10-CM

## 2019-06-06 PROBLEM — D70.1 CHEMOTHERAPY INDUCED NEUTROPENIA (HCC): Status: ACTIVE | Noted: 2019-06-06

## 2019-06-06 PROBLEM — T45.1X5A CHEMOTHERAPY INDUCED NEUTROPENIA (HCC): Status: ACTIVE | Noted: 2019-06-06

## 2019-06-06 RX ORDER — DIPHENHYDRAMINE HYDROCHLORIDE 50 MG/ML
50 INJECTION, SOLUTION INTRAMUSCULAR; INTRAVENOUS AS NEEDED
Status: CANCELLED
Start: 2019-06-12

## 2019-06-06 RX ORDER — ONDANSETRON 2 MG/ML
8 INJECTION INTRAMUSCULAR; INTRAVENOUS ONCE
Status: CANCELLED | OUTPATIENT
Start: 2019-06-12

## 2019-06-06 RX ORDER — SODIUM CHLORIDE 9 MG/ML
25 INJECTION, SOLUTION INTRAVENOUS CONTINUOUS
Status: CANCELLED | OUTPATIENT
Start: 2019-06-12

## 2019-06-06 RX ORDER — ACETAMINOPHEN 325 MG/1
650 TABLET ORAL AS NEEDED
Status: CANCELLED
Start: 2019-06-12

## 2019-06-06 RX ORDER — HYDROCORTISONE SODIUM SUCCINATE 100 MG/2ML
100 INJECTION, POWDER, FOR SOLUTION INTRAMUSCULAR; INTRAVENOUS AS NEEDED
Status: CANCELLED | OUTPATIENT
Start: 2019-06-12

## 2019-06-06 RX ORDER — ONDANSETRON 2 MG/ML
8 INJECTION INTRAMUSCULAR; INTRAVENOUS AS NEEDED
Status: CANCELLED | OUTPATIENT
Start: 2019-06-12

## 2019-06-06 RX ORDER — EPINEPHRINE 1 MG/ML
0.3 INJECTION, SOLUTION, CONCENTRATE INTRAVENOUS AS NEEDED
Status: CANCELLED | OUTPATIENT
Start: 2019-06-12

## 2019-06-06 RX ORDER — HEPARIN 100 UNIT/ML
300-500 SYRINGE INTRAVENOUS AS NEEDED
Status: CANCELLED
Start: 2019-06-12

## 2019-06-06 RX ORDER — ALBUTEROL SULFATE 0.83 MG/ML
2.5 SOLUTION RESPIRATORY (INHALATION) AS NEEDED
Status: CANCELLED
Start: 2019-06-12

## 2019-06-06 RX ORDER — SODIUM CHLORIDE 0.9 % (FLUSH) 0.9 %
10 SYRINGE (ML) INJECTION AS NEEDED
Status: CANCELLED
Start: 2019-06-12

## 2019-06-06 RX ORDER — SODIUM CHLORIDE 9 MG/ML
10 INJECTION INTRAMUSCULAR; INTRAVENOUS; SUBCUTANEOUS AS NEEDED
Status: CANCELLED | OUTPATIENT
Start: 2019-06-12

## 2019-06-06 RX ORDER — DIPHENHYDRAMINE HYDROCHLORIDE 50 MG/ML
50 INJECTION, SOLUTION INTRAMUSCULAR; INTRAVENOUS ONCE
Status: CANCELLED | OUTPATIENT
Start: 2019-06-12

## 2019-06-06 NOTE — H&P
[facility] [address], [address] MITCHELL [phone]  F [phone]      Patient ID:  Name:  Su Hallman  MRN:  [id number]  :  1965/53 y.o. Date:  2019      HISTORY OF PRESENT ILLNESS:  Su Hallman is a 48 y.o.  perimenopausal female who is being seen for at least sever cervical dysplasia. She is referred by Dr. Monica Duarte. Her most recent pap smear was read as HGSIL and she was high-risk HPV positive. She underwent subsequent colposcopy with biopsy. This revealed ZBIGNIEW 2-3, but an invasive process could not be excluded. On a pelvic ultrasound she was noted to have a complex 4.1 cm right adnexal mass and a 3.7 cm complex mass in the cervix. I have been asked to see her in consultation for further evaluation and management. I recommended an exam under anesthesia with cervical biopsy, cystoscopy, proctoscopy. This was performed on 19. Operative findings:  Replacement of cervix with carcinoma, with extension into proximal anterior vagina.  Parametrial thickening bilaterally.  Uterus still somewhat mobile.  No appreciable disease in the bladder or rectum. FINAL PATHOLOGIC DIAGNOSIS   Cervix, biopsy:   Invasive squamous cell carcinoma   Comment   The case is also seen by Dr. Yessi Rivera who concurs with the findings. The results are communicated with Dr. Matthew Bowers nurse, Diamantina Osgood at approximately 1:30pm on 2019. Following the procedure and confirmation of the diagnosis of cancer, I sent her for a PET/CT. She presents today to review those results and to discuss treatment. ROS:   and GI review:  Negative  Cardiopulmonary review:  Negative   Musculoskeletal:  Negative    A comprehensive review of systems was negative except for that written in the History of Present Illness. , 10 point ROS      OB/GYN ROS:  There is no history of significant gyn problems or procedures.       Problem List:  Patient Active Problem List Diagnosis Date Noted    [facility] 2019    Malignant neoplasm of overlapping sites of cervix ([facility] 2019    Complex cyst of right ovary 2019     PMH:  Past Medical History:   Diagnosis Date    Cancer ([facility] 2019    CEVICAL    Rheumatoid arthritis ([facility]       PSH:  Past Surgical History:   Procedure Laterality Date    HX  SECTION  1997    HX COLPOSCOPY  2019    HGSIL      Social History:  Social History     Tobacco Use    Smoking status: Former Smoker     Packs/day: 0.50     Years: 2.00     Pack years: 1.00     Last attempt to quit: 1991     Years since quittin.0    Smokeless tobacco: Never Used   Substance Use Topics    Alcohol use: Yes     Comment: OCCASIONALLY      Family History:  Family History   Problem Relation Age of Onset    No Known Problems Mother     Other Father         ACCIDENTAL DEATH    Thyroid Disease Brother     Anesth Problems Neg Hx       Medications: (reviewed)  Current Facility-Administered Medications   Medication Dose Route Frequency    cefoTEtan (CEFOTAN) 2 g in 0.9% sodium chloride (MBP/ADV) 50 mL  2 g IntraVENous ON CALL TO OR    lactated Ringers infusion  25 mL/hr IntraVENous CONTINUOUS    0.9% sodium chloride infusion  25 mL/hr IntraVENous CONTINUOUS    sodium chloride (NS) flush 5-40 mL  5-40 mL IntraVENous Q8H    sodium chloride (NS) flush 5-40 mL  5-40 mL IntraVENous PRN    lidocaine (PF) (XYLOCAINE) 10 mg/mL (1 %) injection 0.1 mL  0.1 mL SubCUTAneous PRN    fentaNYL citrate (PF) injection 50 mcg  50 mcg IntraVENous PRN    midazolam (VERSED) injection 1 mg  1 mg IntraVENous PRN    midazolam (VERSED) injection 1 mg  1 mg IntraVENous PRN    acetaminophen (TYLENOL) tablet 650 mg  650 mg Oral ONCE    ropivacaine (PF) (NAROPIN) 5 mg/mL (0.5 %) injection 150 mg  30 mL Peripheral Nerve Block PRN     Allergies: (reviewed)  No Known Allergies       OBJECTIVE:    Physical Exam:  VITAL SIGNS: Vitals:    06/07/19 1144 06/07/19 1148   BP: 112/76    Pulse: 80    Resp: 18    Temp: 98.3 °F (36.8 °C)    SpO2: 100%    Weight:  180 lb (81.6 kg)   Height:  5' 2\" (1.575 m)     Body mass index is 32.92 kg/m². GENERAL FAUSTINA: Conversant, alert, oriented. No acute distress. HEENT: HEENT. No thyroid enlargement. No JVD. Neck: Supple without restrictions. RESPIRATORY: Clear to auscultation and percussion to the bases. No CVAT. CARDIOVASC: RRR without murmur/rub. GASTROINT: soft, non-tender, without masses or organomegaly   MUSCULOSKEL: no joint tenderness, deformity or swelling   EXTREMITIES: extremities normal, atraumatic, no cyanosis or edema   PELVIC: Deferred   RECTAL: Deferred   KEIRY SURVEY: No suspicious lymphadenopathy or edema noted. NEURO: Grossly intact. No acute deficit. Lab Data:    Lab Results   Component Value Date/Time    WBC 5.3 05/20/2019 12:14 PM    HGB 11.2 (L) 05/20/2019 12:14 PM    HCT 35.4 05/20/2019 12:14 PM    PLATELET 727 64/43/4746 12:14 PM    MCV 86.8 05/20/2019 12:14 PM     Lab Results   Component Value Date/Time    Sodium 141 05/20/2019 12:14 PM    Potassium 4.0 05/20/2019 12:14 PM    Chloride 106 05/20/2019 12:14 PM    CO2 27 05/20/2019 12:14 PM    Anion gap 8 05/20/2019 12:14 PM    Glucose 86 05/20/2019 12:14 PM    BUN 8 05/20/2019 12:14 PM    Creatinine 0.49 (L) 05/20/2019 12:14 PM    BUN/Creatinine ratio 16 05/20/2019 12:14 PM    GFR est AA >60 05/20/2019 12:14 PM    GFR est non-AA >60 05/20/2019 12:14 PM    Calcium 9.0 05/20/2019 12:14 PM         Imaging: Outside pelvic ultrasound reviewed. Pertinent findings noted in HPI.         PET/CT (5/21/19)  HEAD/NECK: There is 1.8 cm left level 4 lymph node with increased metabolic  activity of 11.     CHEST: No foci of abnormal hypermetabolism.  Low-grade activity in a normal right  axillary lymph node is most likely physiologic.     ABDOMEN/PELVIS: There is a 1 cm retrocrural lymph node on the right with  increased metabolic activity of 5.      There is periaortic adenopathy measuring 2 cm with increased metabolic activity  of 9.     There is an enlarged lymph node at the aortic bifurcation with increased  metabolic activity.       There is a complex appearing mass right pelvic sidewall measuring 3.6 cm with  increased metabolic activity of 48.9. There is left iliac adenopathy with increased metabolic activity of 5.7.     There is a 1.4 cm soft tissue nodule intraperitoneal left lower quadrant with  increased metabolic activity of 7.     There is a mass in the cervix with increased metabolic activity of 12  There is right hydronephrosis.     SKELETON: No foci of abnormal hypermetabolism in the axial and visualized  appendicular skeleton.     IMPRESSION:   There is increased metabolic activity in a cervical mass consistent with  known primary neoplasm. There is adenopathy involving right pelvic sidewall, left iliac chain,  para-aortic chain, intra-abdominal nodule left lower quadrant, retrocrural lymph  node and left supra clavicular lymph node with increased metabolic activity  consistent with metastatic disease. The cervical mass appears to be obstructing  the right ureter resulting in moderate right hydroureteronephrosis. IMPRESSION/PLAN:  Dequan Martinez is a 48 y.o. 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 I reviewed with Dequan Martinez her medical records, physical exam, and review of symptoms. We discussed the pathology and I reviewed the PET images with her and her . I explained that she has metastatic disease and she is not a candidate for surgical resection or curative radiation therapy. I discussed with them that the standard of care would be systemic chemotherapy, consisting of Taxol/Cisplatin/Avastin. She was counseled on the expected side effects and potential toxicities of the regimen. Her questions were answered and she wishes to proceed.   If she has a good response, we may reconsider pelvic radiation for local control. It may also be necessary for control of bleeding, if that becomes a significant issue. To facilitate chemotherapy, we will proceed with IV port placement ASAP. She was counseled on the risks, benefits, indications, and alternatives of surgery. Her questions were answered and she wishes to proceed as planned. Signed By: Magali Simons MD     6/7/2019/9:50 AM       Date of Surgery Update:  Presley Swenson was seen and examined. History and physical has been reviewed. The patient has been examined.  There have been no significant clinical changes since the completion of the originally dated History and Physical.    Signed By: Magali Simons MD     June 7, 2019 11:52 AM

## 2019-06-07 ENCOUNTER — ANESTHESIA (OUTPATIENT)
Dept: SURGERY | Age: 54
End: 2019-06-07
Payer: COMMERCIAL

## 2019-06-07 ENCOUNTER — APPOINTMENT (OUTPATIENT)
Dept: GENERAL RADIOLOGY | Age: 54
End: 2019-06-07
Attending: OBSTETRICS & GYNECOLOGY
Payer: COMMERCIAL

## 2019-06-07 ENCOUNTER — HOSPITAL ENCOUNTER (OUTPATIENT)
Age: 54
Setting detail: OUTPATIENT SURGERY
Discharge: HOME | End: 2019-06-07
Attending: OBSTETRICS & GYNECOLOGY | Admitting: OBSTETRICS & GYNECOLOGY
Payer: COMMERCIAL

## 2019-06-07 ENCOUNTER — DOCUMENTATION ONLY (OUTPATIENT)
Dept: GYNECOLOGY | Age: 54
End: 2019-06-07

## 2019-06-07 ENCOUNTER — ANESTHESIA EVENT (OUTPATIENT)
Dept: SURGERY | Age: 54
End: 2019-06-07
Payer: COMMERCIAL

## 2019-06-07 VITALS
DIASTOLIC BLOOD PRESSURE: 63 MMHG | TEMPERATURE: 98.3 F | BODY MASS INDEX: 33.13 KG/M2 | HEART RATE: 79 BPM | WEIGHT: 180 LBS | OXYGEN SATURATION: 97 % | SYSTOLIC BLOOD PRESSURE: 101 MMHG | HEIGHT: 62 IN | RESPIRATION RATE: 15 BRPM

## 2019-06-07 DIAGNOSIS — C53.8 MALIGNANT NEOPLASM OF OVERLAPPING SITES OF CERVIX (HCC): Primary | ICD-10-CM

## 2019-06-07 PROCEDURE — 76010000138 HC OR TIME 0.5 TO 1 HR: Performed by: OBSTETRICS & GYNECOLOGY

## 2019-06-07 PROCEDURE — 76060000032 HC ANESTHESIA 0.5 TO 1 HR: Performed by: OBSTETRICS & GYNECOLOGY

## 2019-06-07 PROCEDURE — 77030020256 HC SOL INJ NACL 0.9%  500ML: Performed by: OBSTETRICS & GYNECOLOGY

## 2019-06-07 PROCEDURE — 74011250637 HC RX REV CODE- 250/637: Performed by: ANESTHESIOLOGY

## 2019-06-07 PROCEDURE — 77030002986 HC SUT PROL J&J -A: Performed by: OBSTETRICS & GYNECOLOGY

## 2019-06-07 PROCEDURE — 76210000021 HC REC RM PH II 0.5 TO 1 HR: Performed by: OBSTETRICS & GYNECOLOGY

## 2019-06-07 PROCEDURE — 74011250636 HC RX REV CODE- 250/636

## 2019-06-07 PROCEDURE — 71045 X-RAY EXAM CHEST 1 VIEW: CPT

## 2019-06-07 PROCEDURE — 77030011640 HC PAD GRND REM COVD -A: Performed by: OBSTETRICS & GYNECOLOGY

## 2019-06-07 PROCEDURE — 74011000258 HC RX REV CODE- 258: Performed by: OBSTETRICS & GYNECOLOGY

## 2019-06-07 PROCEDURE — C1788 PORT, INDWELLING, IMP: HCPCS | Performed by: OBSTETRICS & GYNECOLOGY

## 2019-06-07 PROCEDURE — 76210000016 HC OR PH I REC 1 TO 1.5 HR: Performed by: OBSTETRICS & GYNECOLOGY

## 2019-06-07 PROCEDURE — 77030002933 HC SUT MCRYL J&J -A: Performed by: OBSTETRICS & GYNECOLOGY

## 2019-06-07 PROCEDURE — 74011250636 HC RX REV CODE- 250/636: Performed by: OBSTETRICS & GYNECOLOGY

## 2019-06-07 PROCEDURE — 77030031139 HC SUT VCRL2 J&J -A: Performed by: OBSTETRICS & GYNECOLOGY

## 2019-06-07 PROCEDURE — 74011250636 HC RX REV CODE- 250/636: Performed by: ANESTHESIOLOGY

## 2019-06-07 PROCEDURE — 77030013079 HC BLNKT BAIR HGGR 3M -A: Performed by: NURSE ANESTHETIST, CERTIFIED REGISTERED

## 2019-06-07 PROCEDURE — 74011000250 HC RX REV CODE- 250: Performed by: OBSTETRICS & GYNECOLOGY

## 2019-06-07 DEVICE — SYSTEM INFUS PRT CATH 8FR L66CM INTRO 8FR CHST TI SGL LUMN: Type: IMPLANTABLE DEVICE | Site: CHEST | Status: FUNCTIONAL

## 2019-06-07 RX ORDER — ONDANSETRON 2 MG/ML
INJECTION INTRAMUSCULAR; INTRAVENOUS AS NEEDED
Status: DISCONTINUED | OUTPATIENT
Start: 2019-06-07 | End: 2019-06-07 | Stop reason: HOSPADM

## 2019-06-07 RX ORDER — SODIUM CHLORIDE 0.9 % (FLUSH) 0.9 %
5-40 SYRINGE (ML) INJECTION EVERY 8 HOURS
Status: DISCONTINUED | OUTPATIENT
Start: 2019-06-07 | End: 2019-06-07 | Stop reason: HOSPADM

## 2019-06-07 RX ORDER — LIDOCAINE HYDROCHLORIDE 10 MG/ML
0.1 INJECTION, SOLUTION EPIDURAL; INFILTRATION; INTRACAUDAL; PERINEURAL AS NEEDED
Status: DISCONTINUED | OUTPATIENT
Start: 2019-06-07 | End: 2019-06-07 | Stop reason: HOSPADM

## 2019-06-07 RX ORDER — DEXAMETHASONE SODIUM PHOSPHATE 4 MG/ML
INJECTION, SOLUTION INTRA-ARTICULAR; INTRALESIONAL; INTRAMUSCULAR; INTRAVENOUS; SOFT TISSUE AS NEEDED
Status: DISCONTINUED | OUTPATIENT
Start: 2019-06-07 | End: 2019-06-07 | Stop reason: HOSPADM

## 2019-06-07 RX ORDER — MIDAZOLAM HYDROCHLORIDE 1 MG/ML
1 INJECTION, SOLUTION INTRAMUSCULAR; INTRAVENOUS AS NEEDED
Status: DISCONTINUED | OUTPATIENT
Start: 2019-06-07 | End: 2019-06-07 | Stop reason: HOSPADM

## 2019-06-07 RX ORDER — SODIUM CHLORIDE 0.9 % (FLUSH) 0.9 %
5-40 SYRINGE (ML) INJECTION AS NEEDED
Status: DISCONTINUED | OUTPATIENT
Start: 2019-06-07 | End: 2019-06-07 | Stop reason: HOSPADM

## 2019-06-07 RX ORDER — HEPARIN 100 UNIT/ML
SYRINGE INTRAVENOUS AS NEEDED
Status: DISCONTINUED | OUTPATIENT
Start: 2019-06-07 | End: 2019-06-07 | Stop reason: HOSPADM

## 2019-06-07 RX ORDER — LIDOCAINE HYDROCHLORIDE 20 MG/ML
INJECTION, SOLUTION EPIDURAL; INFILTRATION; INTRACAUDAL; PERINEURAL AS NEEDED
Status: DISCONTINUED | OUTPATIENT
Start: 2019-06-07 | End: 2019-06-07 | Stop reason: HOSPADM

## 2019-06-07 RX ORDER — OXYCODONE HYDROCHLORIDE 5 MG/1
5 TABLET ORAL AS NEEDED
Status: DISCONTINUED | OUTPATIENT
Start: 2019-06-07 | End: 2019-06-07 | Stop reason: HOSPADM

## 2019-06-07 RX ORDER — SODIUM CHLORIDE, SODIUM LACTATE, POTASSIUM CHLORIDE, CALCIUM CHLORIDE 600; 310; 30; 20 MG/100ML; MG/100ML; MG/100ML; MG/100ML
100 INJECTION, SOLUTION INTRAVENOUS CONTINUOUS
Status: DISCONTINUED | OUTPATIENT
Start: 2019-06-07 | End: 2019-06-07 | Stop reason: HOSPADM

## 2019-06-07 RX ORDER — LIDOCAINE AND PRILOCAINE 25; 25 MG/G; MG/G
CREAM TOPICAL
Qty: 30 G | Refills: 2 | Status: SHIPPED | OUTPATIENT
Start: 2019-06-07

## 2019-06-07 RX ORDER — FENTANYL CITRATE 50 UG/ML
50 INJECTION, SOLUTION INTRAMUSCULAR; INTRAVENOUS AS NEEDED
Status: DISCONTINUED | OUTPATIENT
Start: 2019-06-07 | End: 2019-06-07 | Stop reason: HOSPADM

## 2019-06-07 RX ORDER — MIDAZOLAM HYDROCHLORIDE 1 MG/ML
0.5 INJECTION, SOLUTION INTRAMUSCULAR; INTRAVENOUS
Status: DISCONTINUED | OUTPATIENT
Start: 2019-06-07 | End: 2019-06-07 | Stop reason: HOSPADM

## 2019-06-07 RX ORDER — DIPHENHYDRAMINE HYDROCHLORIDE 50 MG/ML
12.5 INJECTION, SOLUTION INTRAMUSCULAR; INTRAVENOUS AS NEEDED
Status: DISCONTINUED | OUTPATIENT
Start: 2019-06-07 | End: 2019-06-07 | Stop reason: HOSPADM

## 2019-06-07 RX ORDER — FENTANYL CITRATE 50 UG/ML
INJECTION, SOLUTION INTRAMUSCULAR; INTRAVENOUS AS NEEDED
Status: DISCONTINUED | OUTPATIENT
Start: 2019-06-07 | End: 2019-06-07 | Stop reason: HOSPADM

## 2019-06-07 RX ORDER — DEXAMETHASONE 4 MG/1
TABLET ORAL
Qty: 36 TAB | Refills: 1 | Status: SHIPPED | OUTPATIENT
Start: 2019-06-07 | End: 2020-06-10

## 2019-06-07 RX ORDER — HYDROMORPHONE HYDROCHLORIDE 1 MG/ML
0.2 INJECTION, SOLUTION INTRAMUSCULAR; INTRAVENOUS; SUBCUTANEOUS
Status: DISCONTINUED | OUTPATIENT
Start: 2019-06-07 | End: 2019-06-07 | Stop reason: HOSPADM

## 2019-06-07 RX ORDER — SODIUM CHLORIDE, SODIUM LACTATE, POTASSIUM CHLORIDE, CALCIUM CHLORIDE 600; 310; 30; 20 MG/100ML; MG/100ML; MG/100ML; MG/100ML
25 INJECTION, SOLUTION INTRAVENOUS CONTINUOUS
Status: DISCONTINUED | OUTPATIENT
Start: 2019-06-07 | End: 2019-06-07 | Stop reason: HOSPADM

## 2019-06-07 RX ORDER — SODIUM CHLORIDE 9 MG/ML
25 INJECTION, SOLUTION INTRAVENOUS CONTINUOUS
Status: DISCONTINUED | OUTPATIENT
Start: 2019-06-07 | End: 2019-06-07 | Stop reason: HOSPADM

## 2019-06-07 RX ORDER — ROPIVACAINE HYDROCHLORIDE 5 MG/ML
30 INJECTION, SOLUTION EPIDURAL; INFILTRATION; PERINEURAL AS NEEDED
Status: DISCONTINUED | OUTPATIENT
Start: 2019-06-07 | End: 2019-06-07 | Stop reason: HOSPADM

## 2019-06-07 RX ORDER — FENTANYL CITRATE 50 UG/ML
25 INJECTION, SOLUTION INTRAMUSCULAR; INTRAVENOUS
Status: DISCONTINUED | OUTPATIENT
Start: 2019-06-07 | End: 2019-06-07 | Stop reason: HOSPADM

## 2019-06-07 RX ORDER — ONDANSETRON 2 MG/ML
4 INJECTION INTRAMUSCULAR; INTRAVENOUS AS NEEDED
Status: DISCONTINUED | OUTPATIENT
Start: 2019-06-07 | End: 2019-06-07 | Stop reason: HOSPADM

## 2019-06-07 RX ORDER — PROPOFOL 10 MG/ML
INJECTION, EMULSION INTRAVENOUS AS NEEDED
Status: DISCONTINUED | OUTPATIENT
Start: 2019-06-07 | End: 2019-06-07 | Stop reason: HOSPADM

## 2019-06-07 RX ORDER — ONDANSETRON 4 MG/1
4 TABLET, ORALLY DISINTEGRATING ORAL
Qty: 30 TAB | Refills: 2 | Status: SHIPPED | OUTPATIENT
Start: 2019-06-07 | End: 2020-06-10

## 2019-06-07 RX ORDER — PROPOFOL 10 MG/ML
INJECTION, EMULSION INTRAVENOUS
Status: DISCONTINUED | OUTPATIENT
Start: 2019-06-07 | End: 2019-06-07 | Stop reason: HOSPADM

## 2019-06-07 RX ORDER — SODIUM CHLORIDE, SODIUM LACTATE, POTASSIUM CHLORIDE, CALCIUM CHLORIDE 600; 310; 30; 20 MG/100ML; MG/100ML; MG/100ML; MG/100ML
INJECTION, SOLUTION INTRAVENOUS
Status: DISCONTINUED | OUTPATIENT
Start: 2019-06-07 | End: 2019-06-07 | Stop reason: HOSPADM

## 2019-06-07 RX ORDER — MIDAZOLAM HYDROCHLORIDE 1 MG/ML
INJECTION, SOLUTION INTRAMUSCULAR; INTRAVENOUS AS NEEDED
Status: DISCONTINUED | OUTPATIENT
Start: 2019-06-07 | End: 2019-06-07 | Stop reason: HOSPADM

## 2019-06-07 RX ORDER — ACETAMINOPHEN 325 MG/1
650 TABLET ORAL ONCE
Status: COMPLETED | OUTPATIENT
Start: 2019-06-07 | End: 2019-06-07

## 2019-06-07 RX ORDER — MORPHINE SULFATE 10 MG/ML
2 INJECTION, SOLUTION INTRAMUSCULAR; INTRAVENOUS
Status: DISCONTINUED | OUTPATIENT
Start: 2019-06-07 | End: 2019-06-07 | Stop reason: HOSPADM

## 2019-06-07 RX ORDER — KETOROLAC TROMETHAMINE 30 MG/ML
INJECTION, SOLUTION INTRAMUSCULAR; INTRAVENOUS AS NEEDED
Status: DISCONTINUED | OUTPATIENT
Start: 2019-06-07 | End: 2019-06-07 | Stop reason: HOSPADM

## 2019-06-07 RX ADMIN — PROPOFOL 50 MG: 10 INJECTION, EMULSION INTRAVENOUS at 13:01

## 2019-06-07 RX ADMIN — ONDANSETRON 4 MG: 2 INJECTION INTRAMUSCULAR; INTRAVENOUS at 13:14

## 2019-06-07 RX ADMIN — LIDOCAINE HYDROCHLORIDE 60 MG: 20 INJECTION, SOLUTION EPIDURAL; INFILTRATION; INTRACAUDAL; PERINEURAL at 12:59

## 2019-06-07 RX ADMIN — CEFOTETAN DISODIUM 2 G: 2 INJECTION, POWDER, FOR SOLUTION INTRAMUSCULAR; INTRAVENOUS at 13:03

## 2019-06-07 RX ADMIN — PROPOFOL 50 MCG/KG/MIN: 10 INJECTION, EMULSION INTRAVENOUS at 13:04

## 2019-06-07 RX ADMIN — FENTANYL CITRATE 50 MCG: 50 INJECTION, SOLUTION INTRAMUSCULAR; INTRAVENOUS at 13:23

## 2019-06-07 RX ADMIN — PROPOFOL 50 MG: 10 INJECTION, EMULSION INTRAVENOUS at 13:23

## 2019-06-07 RX ADMIN — DEXAMETHASONE SODIUM PHOSPHATE 8 MG: 4 INJECTION, SOLUTION INTRA-ARTICULAR; INTRALESIONAL; INTRAMUSCULAR; INTRAVENOUS; SOFT TISSUE at 13:06

## 2019-06-07 RX ADMIN — SODIUM CHLORIDE, SODIUM LACTATE, POTASSIUM CHLORIDE, CALCIUM CHLORIDE: 600; 310; 30; 20 INJECTION, SOLUTION INTRAVENOUS at 12:59

## 2019-06-07 RX ADMIN — ACETAMINOPHEN 650 MG: 325 TABLET ORAL at 12:09

## 2019-06-07 RX ADMIN — SODIUM CHLORIDE, SODIUM LACTATE, POTASSIUM CHLORIDE, AND CALCIUM CHLORIDE 25 ML/HR: 600; 310; 30; 20 INJECTION, SOLUTION INTRAVENOUS at 12:04

## 2019-06-07 RX ADMIN — MIDAZOLAM HYDROCHLORIDE 3 MG: 1 INJECTION, SOLUTION INTRAMUSCULAR; INTRAVENOUS at 12:58

## 2019-06-07 RX ADMIN — FENTANYL CITRATE 25 MCG: 50 INJECTION, SOLUTION INTRAMUSCULAR; INTRAVENOUS at 13:06

## 2019-06-07 RX ADMIN — FENTANYL CITRATE 25 MCG: 50 INJECTION, SOLUTION INTRAMUSCULAR; INTRAVENOUS at 12:58

## 2019-06-07 RX ADMIN — KETOROLAC TROMETHAMINE 30 MG: 30 INJECTION, SOLUTION INTRAMUSCULAR; INTRAVENOUS at 13:23

## 2019-06-07 NOTE — ANESTHESIA PREPROCEDURE EVALUATION
Relevant Problems   No relevant active problems       Anesthetic History   No history of anesthetic complications            Review of Systems / Medical History  Patient summary reviewed, nursing notes reviewed and pertinent labs reviewed    Pulmonary  Within defined limits                 Neuro/Psych   Within defined limits           Cardiovascular  Within defined limits                Exercise tolerance: >4 METS     GI/Hepatic/Renal  Within defined limits              Endo/Other        Obesity, arthritis and cancer (cervical)     Other Findings              Physical Exam    Airway  Mallampati: II  TM Distance: 4 - 6 cm  Neck ROM: normal range of motion   Mouth opening: Normal     Cardiovascular  Regular rate and rhythm,  S1 and S2 normal,  no murmur, click, rub, or gallop             Dental  No notable dental hx       Pulmonary  Breath sounds clear to auscultation               Abdominal  GI exam deferred       Other Findings            Anesthetic Plan    ASA: 2  Anesthesia type: MAC          Induction: Intravenous  Anesthetic plan and risks discussed with: Patient

## 2019-06-07 NOTE — BRIEF OP NOTE
BRIEF OPERATIVE NOTE    Date of Procedure: 6/7/2019   Preoperative Diagnosis: CERVICAL CANCER  Postoperative Diagnosis: CERVICAL CANCER    Procedure(s):  PORTACATH INSERTION  Surgeon(s) and Role:     Marshall Teixeira MD - Primary         Surgical Assistant: None    Surgical Staff:  Radiology Technician: Yumi De Los Santos RT, R  Scrub Tech-1: Jessie Velazquez  Scrub RN-1: Julius Lorenzo RN  Event Time In Time Out   Incision Start 1313    Incision Close 1336      Anesthesia: MAC   Estimated Blood Loss: <5 cc  Specimens: * No specimens in log *   Findings: Normal anatomy. Complications: None  Implants:   Implant Name Type Inv.  Item Serial No.  Lot No. LRB No. Used Action   PORT VASC INFUS SET 8FR TI -- SMART PORT CT - SNA  PORT VASC INFUS SET 8FR TI -- SMART PORT CT NA ANGIODYNAMICS [id number] Left 1 Implanted

## 2019-06-07 NOTE — OP NOTES
Gynecologic Oncology Operative Report    [place] 6/7/2019    Pre-operative diagnosis:   1) Cervical CA      2) Requires venous access for chemotherapy     Post-operative diagnosis: 1) Cervical CA      2) Requires venous access for chemotherapy    Procedure:  IV port placement    Surgeon:  Lv Kruger MD    Assistant:  N/A    Anesthesia:  MAC, plus local    EBL:  5 cc    Implants:   Implant Name Type Inv. Item Serial No.  Lot No. LRB No. Used Action   PORT VASC INFUS SET 8FR TI -- SMART PORT CT - SNA  PORT VASC INFUS SET 8FR TI -- SMART PORT CT NA ANGIODYNAMICS [id number] Left 1 Implanted     Complications:  None    Operative indications:  49 yo  female with stage IV cervical cancer. She is scheduled to begin systemic chemotherapy. Operative findings:  Normal anatomy. Procedure in detail:  After the risks, benefits, indications, and alternatives of the procedure were discussed with the patient and informed consent was obtained, the patient was taken to the operating room. She was then correctly identified, administered IV sedation per anesthesia, and then prepped and draped in the supine position in the usual fashion. Her arms were also tucked at each side. The patient was then placed in slight Trendelenburg tilt and the anatomy of the anterior thoracic wall was noted. 1% lidocaine without epinephrine was then injected just underneath the left mid clavicle to provide local anesthesia. The left subclavian vein was then located and entered with an 18-gauge needle. A guidewire was then inserted through the needle and proper placement was confirmed by fluoroscopy. A small incision was made with an #11-blade scalpel at the insertion site and the catheter introducer and dilator were inserted over the guidewire. The guidewire and dilator were then removed. The 8 Guyanese catheter was then inserted through the introducer to a depth of 20 cm at skin level.   Proper placement was confirmed by fluoroscopy with the catheter tip positioned in the superior vena cava just above the right atrium. The peel-away catheter introducer was then removed. Good flow and return were noted through the catheter. Attention was then directed to creation of the port pocket. The location of the pocket was selected and 1% lidocaine was injected. The site was approximately 4 cm caudad to the catheter insertion site. An  4 cm skin incision was made transversely with a #15-blade scalpel. The incision was then carried down to the pectoralis fascia with electro-cautery. A port pocket was then bluntly created, large enough to accommodate the standard-size [employer] Smartport. The port was then secured to the pectoralis fascia with 2-0 Proline suture at three locations. The catheter was then tunneled from the insertion site to the port site using the tunneling device. The catheter was cut to fit and then attached to the port. The catheter was secured to the port using the locking device. The port was then accessed with a [place] needle and good glow and returned were noted. The port was then flushed with concentrated Heparin solution. The port pocket was then irrigated and made hemostatic with electro-cautery. The incision was then closed in two layers. The subcutaneous fat was reapproximated with a running 3-0 Vicryl. The skin was then reapproximated with a 4-0 Monocryl in a running subcuticular fashion. Steri-strips were then applied, followed by a sterile pressure dressing. The patient was then awakened from anesthesia and taken to the recovery room in stable condition. All instrument, sponge, and needle counts were correct. A chest X-ray was ordered for the recovery room, but the results are pending at the time of this dictation.       Lauren Deleon MD  6/7/2019  1:39 PM

## 2019-06-07 NOTE — PROGRESS NOTES
1 hour spent with pt, her  and 2 son's reviewing chemotherapy (Taxol, Cisplatin, Avastin) side effects. Books Chemotherapy and You, Eating hints before, during chemotherapy given. Pt advised to keep a diary during tx. Handout for CRC given and pt is interested and will contact Andrez Orellana to schedule, pt will call [facility] to find support groups in her area. All questions answered, consent signed and scanned into CC. Pt will have pre chemo labs drawn this morning ( lab req given). 1st chemo tx 6/12/19, and schedule of lab/MD/chemotherpy given to pt.

## 2019-06-07 NOTE — ROUTINE PROCESS
Patient: Clem Simmonds MRN: [id number]  SSN: [id number] YOB: 1965  Age: 48 y.o. Sex: female Patient is status post Procedure(s): PORTACATH INSERTION. Surgeon(s) and Role: Kasey Fischer MD - Primary Local/Dose/Irrigation:  See [facility] Peripheral IV 06/07/19 Right Hand (Active) Airway - Endotracheal Tube 06/07/19 Oral (Active) Dressing/Packing:  Wound Chest Left-Dressing Type: Adhesive wound closure strips (Steri-Strips); Adhesive wound dressing (Mastisol); Non adherent (06/07/19 1300) Splint/Cast:  ] Other:

## 2019-06-07 NOTE — ANESTHESIA POSTPROCEDURE EVALUATION
Post-Anesthesia Evaluation and Assessment    Patient: Leena Fontenot MRN: [id number]  SSN: [id number]    YOB: 1965  Age: 48 y.o. Sex: female      I have evaluated the patient and they are stable and ready for discharge from the PACU. Cardiovascular Function/Vital Signs  Visit Vitals  /59   Pulse 66   Temp 36.5 °C (97.7 °F)   Resp 13   Ht 5' 2\" (1.575 m)   Wt 81.6 kg (180 lb)   SpO2 100%   BMI 32.92 kg/m²       Patient is status post MAC anesthesia for Procedure(s):  PORTACATH INSERTION. Nausea/Vomiting: None    Postoperative hydration reviewed and adequate. Pain:  Pain Scale 1: Numeric (0 - 10) (06/07/19 1355)  Pain Intensity 1: 0 (06/07/19 1355)   Managed    Neurological Status:   Neuro (WDL): Exceptions to WDL (06/07/19 1355)  Neuro  Neurologic State: Drowsy(s/p anesthesia, easily arousable) (06/07/19 1355)  Orientation Level: Oriented X4 (06/07/19 1355)  Cognition: Follows commands (06/07/19 1355)  Speech: Clear (06/07/19 1355)  LUE Motor Response: Purposeful (06/07/19 1355)  LLE Motor Response: Purposeful (06/07/19 1355)  RUE Motor Response: Purposeful (06/07/19 1355)  RLE Motor Response: Purposeful (06/07/19 1355)   At baseline    Mental Status, Level of Consciousness: Alert and  oriented to person, place, and time    Pulmonary Status:   O2 Device: Nasal cannula (06/07/19 1357)   Adequate oxygenation and airway patent    Complications related to anesthesia: None    Post-anesthesia assessment completed. No concerns    Signed By: Ples Runner, MD     June 7, 2019              Procedure(s):  PORTACATH INSERTION. MAC    <BSHSIANPOST>    Vitals Value Taken Time   BP 97/64 6/7/2019  2:15 PM   Temp 36.5 °C (97.7 °F) 6/7/2019  1:57 PM   Pulse 64 6/7/2019  2:16 PM   Resp 12 6/7/2019  2:16 PM   SpO2 96 % 6/7/2019  2:16 PM   Vitals shown include unvalidated device data.

## 2019-06-07 NOTE — TELEPHONE ENCOUNTER
Requested Prescriptions     Signed Prescriptions Disp Refills    ondansetron (ZOFRAN ODT) 4 mg disintegrating tablet 30 Tab 2     Sig: Take 1 Tab by mouth every six (6) hours as needed for Nausea. Indications: Nausea and Vomiting caused by Cancer Drugs     Authorizing Provider: Janalee Meigs     Ordering User: Jeaneth Shearer    lidocaine-prilocaine (EMLA) topical cream 30 g 2     Sig: Apply small amount over port area one hour before chemo treatment and cover with a Band-Aid     Authorizing Provider: Janalee Meigs     Ordering User: Malinda Ron dexamethasone (DECADRON) 4 mg tablet 36 Tab 1     Sig: Take 2 tablets by mouth with breakfast the day before chemo also take 2 tablets with breakfast for 2 days after chemotherapy     Authorizing Provider: Janalee Meigs     Ordering User: Jeaneth Shearer     Rx sent to pharmacy per vo Dr. Flaquita Garcia to use during chemotherapy.

## 2019-06-07 NOTE — DISCHARGE INSTRUCTIONS
Patient Education      ACETAMINOPHEN 650 MG GIVEN @1209 PM TODAY  TORADOL 30 MG GIVEN @ 1:23 PM TODAY          6/7/2019      RE: Madalyn Craven      To Whom it May Concern: This is to certify that Madalyn Craven may return to work on Monday June 17 since she may not lift anything heavy for 1 week. Please feel free to contact my office if you have any questions or concerns. Thank you for your assistance in this matter. Sincerely,      Giovana Magdaleno RN/Dr.Johnny العلي [employer]    KEEP DRESSING DRY UNTIL MONDAY June 10TH, THEN REMOVE DRESSING ON Monday, AND OK TO SHOWER    Implanted Counts include 234 beds at the [facility] PROVIDERS [facility] for Chemotherapy: Care Instructions  Your Care Instructions  An implanted port is a device placed, in most cases, under the skin of your chest below your collarbone. It is made of plastic, stainless steel, or titanium. The port is about the size of a quarter, but thicker. A thin, flexible tube called a catheter runs from the port into a large vein. A membrane (septum) similar to a pencil eraser is in the center of the port. A nurse uses a needle to put chemotherapy or other medicine and fluids through the septum into a blood vessel. A health professional also can take blood for tests through the port. An implanted port can be used for months. A special needle (called a [place] needle) may stay in the port for a short time. The port and catheter need regular care to make sure they do not get blocked. Tell your doctor if you take aspirin or some other blood thinner. These medicines can increase the chance of bleeding inside your body. Follow-up care is a key part of your treatment and safety. Be sure to make and go to all appointments, and call your doctor if you are having problems. It's also a good idea to know your test results and keep a list of the medicines you take. How can you care for yourself at home? · You will probably need to take 1 day off from work and will be able return to normal activities shortly after. This depends on the type of work you do, why you have the port, and how you feel. · You probably will be able to bathe and swim. But you may need to avoid some activities if a [place] needle is left in the port. Talk to your doctor about any limits on your activity. · Some clothes may rub the skin over the port. Do not wear a bra or suspenders that irritate your skin near the port. Do not have your blood pressure taken on the arm with the port. · You will get a medical alert card with information about your port. Carry this with you. It will tell health care workers you have a port in case you need emergency care. · Your port will need regular flushing to keep it open. A nurse or other health professional will do this for you. When should you call for help? Call your doctor now or seek immediate medical care if:    · You have signs of infection, such as:  ? Increased pain, swelling, warmth, or redness near the port. ? Red streaks leading from the port. ? Pus draining from the port. ? A fever.     · You have pain or swelling in your neck or arm.     · You have trouble breathing.    Watch closely for changes in your health, and be sure to contact your doctor if:    · You have any problems with your port. Where can you learn more? Go to http://jonnathan-fartun.info/. Enter [phone] in the search box to learn more about \"Implanted [facility] HEALTH PROVIDERS LIMITED [facility] for Chemotherapy: Care Instructions. \"  Current as of: September 23, 2018  Content Version: 11.9  © 4344-8497 [employer]. Care instructions adapted under license by [employer] (which disclaims liability or warranty for this information). If you have questions about a medical condition or this instruction, always ask your healthcare professional. [place] [address] any warranty or liability for your use of this information.        ______________________________________________________________________    Anesthesia Discharge Instructions    After general anesthesia or intervenous sedation, for 24 hours or while taking prescription Narcotics:  · Limit your activities  · Do not drive or operate hazardous machinery  · If you have not urinated within 8 hours after discharge, please contact your surgeon on call. · Do not make important personal or business decisions  · Do not drink alcoholic beverages    Report the following to your surgeon:  · Excessive pain, swelling, redness or odor of or around the surgical area  · Temperature over 100.5 degrees  · Nausea and vomiting lasting longer than 4 hours or if unable to take medication  · Any signs of decreased circulation or nerve impairment to extremity:  Change in color, persistent numbness, tingling, coldness or increased pain.   · Any questions

## 2019-06-08 LAB
ALBUMIN SERPL-MCNC: 4.1 G/DL (ref 3.5–5.5)
ALBUMIN/GLOB SERPL: 1.1 {RATIO} (ref 1.2–2.2)
ALP SERPL-CCNC: 69 IU/L (ref 39–117)
ALT SERPL-CCNC: 7 IU/L (ref 0–32)
APPEARANCE UR: ABNORMAL
AST SERPL-CCNC: 17 IU/L (ref 0–40)
BACTERIA #/AREA URNS HPF: ABNORMAL /[HPF]
BASOPHILS # BLD AUTO: 0 X10E3/UL (ref 0–0.2)
BASOPHILS NFR BLD AUTO: 0 %
BILIRUB SERPL-MCNC: 0.3 MG/DL (ref 0–1.2)
BILIRUB UR QL STRIP: NEGATIVE
BUN SERPL-MCNC: 7 MG/DL (ref 6–24)
BUN/CREAT SERPL: 13 (ref 9–23)
CALCIUM SERPL-MCNC: 9.2 MG/DL (ref 8.7–10.2)
CASTS URNS QL MICRO: ABNORMAL /LPF
CHLORIDE SERPL-SCNC: 103 MMOL/L (ref 96–106)
CO2 SERPL-SCNC: 26 MMOL/L (ref 20–29)
COLOR UR: YELLOW
CREAT SERPL-MCNC: 0.52 MG/DL (ref 0.57–1)
EOSINOPHIL # BLD AUTO: 0.2 X10E3/UL (ref 0–0.4)
EOSINOPHIL NFR BLD AUTO: 4 %
EPI CELLS #/AREA URNS HPF: ABNORMAL /HPF (ref 0–10)
ERYTHROCYTE [DISTWIDTH] IN BLOOD BY AUTOMATED COUNT: 15 % (ref 12.3–15.4)
GLOBULIN SER CALC-MCNC: 3.6 G/DL (ref 1.5–4.5)
GLUCOSE SERPL-MCNC: 85 MG/DL (ref 65–99)
GLUCOSE UR QL: NEGATIVE
HCT VFR BLD AUTO: 35.4 % (ref 34–46.6)
HGB BLD-MCNC: 11.1 G/DL (ref 11.1–15.9)
HGB UR QL STRIP: ABNORMAL
IMM GRANULOCYTES # BLD AUTO: 0 X10E3/UL (ref 0–0.1)
IMM GRANULOCYTES NFR BLD AUTO: 0 %
KETONES UR QL STRIP: NEGATIVE
LEUKOCYTE ESTERASE UR QL STRIP: ABNORMAL
LYMPHOCYTES # BLD AUTO: 1.5 X10E3/UL (ref 0.7–3.1)
LYMPHOCYTES NFR BLD AUTO: 31 %
MAGNESIUM SERPL-MCNC: 2.2 MG/DL (ref 1.6–2.3)
MCH RBC QN AUTO: 27.2 PG (ref 26.6–33)
MCHC RBC AUTO-ENTMCNC: 31.4 G/DL (ref 31.5–35.7)
MCV RBC AUTO: 87 FL (ref 79–97)
MICRO URNS: ABNORMAL
MONOCYTES # BLD AUTO: 0.5 X10E3/UL (ref 0.1–0.9)
MONOCYTES NFR BLD AUTO: 10 %
MUCOUS THREADS URNS QL MICRO: PRESENT
NEUTROPHILS # BLD AUTO: 2.7 X10E3/UL (ref 1.4–7)
NEUTROPHILS NFR BLD AUTO: 55 %
NITRITE UR QL STRIP: NEGATIVE
PH UR STRIP: 6.5 [PH] (ref 5–7.5)
PLATELET # BLD AUTO: 322 X10E3/UL (ref 150–450)
POTASSIUM SERPL-SCNC: 4.4 MMOL/L (ref 3.5–5.2)
PROT SERPL-MCNC: 7.7 G/DL (ref 6–8.5)
PROT UR QL STRIP: NEGATIVE
RBC # BLD AUTO: 4.08 X10E6/UL (ref 3.77–5.28)
RBC #/AREA URNS HPF: ABNORMAL /HPF (ref 0–2)
SODIUM SERPL-SCNC: 143 MMOL/L (ref 134–144)
SP GR UR: 1.01 (ref 1–1.03)
UROBILINOGEN UR STRIP-MCNC: 0.2 MG/DL (ref 0.2–1)
WBC # BLD AUTO: 4.9 X10E3/UL (ref 3.4–10.8)
WBC #/AREA URNS HPF: >30 /HPF (ref 0–5)

## 2019-06-12 ENCOUNTER — HOSPITAL ENCOUNTER (OUTPATIENT)
Dept: INFUSION THERAPY | Age: 54
Discharge: HOME | End: 2019-06-12
Payer: COMMERCIAL

## 2019-06-12 VITALS
DIASTOLIC BLOOD PRESSURE: 71 MMHG | BODY MASS INDEX: 31.36 KG/M2 | WEIGHT: 177 LBS | HEART RATE: 77 BPM | HEIGHT: 63 IN | RESPIRATION RATE: 18 BRPM | TEMPERATURE: 97 F | SYSTOLIC BLOOD PRESSURE: 108 MMHG

## 2019-06-12 DIAGNOSIS — C53.8 MALIGNANT NEOPLASM OF OVERLAPPING SITES OF CERVIX (HCC): ICD-10-CM

## 2019-06-12 DIAGNOSIS — T45.1X5A CHEMOTHERAPY INDUCED NEUTROPENIA (HCC): Primary | ICD-10-CM

## 2019-06-12 DIAGNOSIS — D70.1 CHEMOTHERAPY INDUCED NEUTROPENIA (HCC): Primary | ICD-10-CM

## 2019-06-12 PROCEDURE — 96417 CHEMO IV INFUS EACH ADDL SEQ: CPT

## 2019-06-12 PROCEDURE — 74011250636 HC RX REV CODE- 250/636: Performed by: OBSTETRICS & GYNECOLOGY

## 2019-06-12 PROCEDURE — 96366 THER/PROPH/DIAG IV INF ADDON: CPT

## 2019-06-12 PROCEDURE — 96367 TX/PROPH/DG ADDL SEQ IV INF: CPT

## 2019-06-12 PROCEDURE — 96415 CHEMO IV INFUSION ADDL HR: CPT

## 2019-06-12 PROCEDURE — 74011250636 HC RX REV CODE- 250/636

## 2019-06-12 PROCEDURE — 96377 APPLICATON ON-BODY INJECTOR: CPT

## 2019-06-12 PROCEDURE — 74011000258 HC RX REV CODE- 258: Performed by: OBSTETRICS & GYNECOLOGY

## 2019-06-12 PROCEDURE — 77030012965 HC NDL HUBR BBMI -A

## 2019-06-12 PROCEDURE — 96361 HYDRATE IV INFUSION ADD-ON: CPT

## 2019-06-12 PROCEDURE — 74011250637 HC RX REV CODE- 250/637: Performed by: OBSTETRICS & GYNECOLOGY

## 2019-06-12 PROCEDURE — 74011000250 HC RX REV CODE- 250: Performed by: OBSTETRICS & GYNECOLOGY

## 2019-06-12 PROCEDURE — 96375 TX/PRO/DX INJ NEW DRUG ADDON: CPT

## 2019-06-12 PROCEDURE — 96413 CHEMO IV INFUSION 1 HR: CPT

## 2019-06-12 RX ORDER — HEPARIN 100 UNIT/ML
300-500 SYRINGE INTRAVENOUS AS NEEDED
Status: ACTIVE | OUTPATIENT
Start: 2019-06-12 | End: 2019-06-12

## 2019-06-12 RX ORDER — SODIUM CHLORIDE 9 MG/ML
10 INJECTION INTRAMUSCULAR; INTRAVENOUS; SUBCUTANEOUS AS NEEDED
Status: ACTIVE | OUTPATIENT
Start: 2019-06-12 | End: 2019-06-12

## 2019-06-12 RX ORDER — ONDANSETRON 2 MG/ML
8 INJECTION INTRAMUSCULAR; INTRAVENOUS ONCE
Status: COMPLETED | OUTPATIENT
Start: 2019-06-12 | End: 2019-06-12

## 2019-06-12 RX ORDER — SODIUM CHLORIDE 9 MG/ML
25 INJECTION, SOLUTION INTRAVENOUS CONTINUOUS
Status: DISPENSED | OUTPATIENT
Start: 2019-06-12 | End: 2019-06-12

## 2019-06-12 RX ORDER — SODIUM CHLORIDE 0.9 % (FLUSH) 0.9 %
10 SYRINGE (ML) INJECTION AS NEEDED
Status: ACTIVE | OUTPATIENT
Start: 2019-06-12 | End: 2019-06-12

## 2019-06-12 RX ORDER — DIPHENHYDRAMINE HYDROCHLORIDE 50 MG/ML
50 INJECTION, SOLUTION INTRAMUSCULAR; INTRAVENOUS ONCE
Status: DISCONTINUED | OUTPATIENT
Start: 2019-06-12 | End: 2019-06-12

## 2019-06-12 RX ORDER — DIPHENHYDRAMINE HCL 25 MG
50 CAPSULE ORAL ONCE
Status: COMPLETED | OUTPATIENT
Start: 2019-06-12 | End: 2019-06-12

## 2019-06-12 RX ADMIN — BEVACIZUMAB 1210 MG: 400 INJECTION, SOLUTION INTRAVENOUS at 10:40

## 2019-06-12 RX ADMIN — SODIUM CHLORIDE 10 ML: 9 INJECTION, SOLUTION INTRAMUSCULAR; INTRAVENOUS; SUBCUTANEOUS at 08:45

## 2019-06-12 RX ADMIN — CISPLATIN 94 MG: 1 INJECTION, SOLUTION INTRAVENOUS at 15:22

## 2019-06-12 RX ADMIN — Medication 10 ML: at 08:50

## 2019-06-12 RX ADMIN — SODIUM CHLORIDE 150 MG: 900 INJECTION, SOLUTION INTRAVENOUS at 10:08

## 2019-06-12 RX ADMIN — SODIUM CHLORIDE, PRESERVATIVE FREE 500 UNITS: 5 INJECTION INTRAVENOUS at 18:32

## 2019-06-12 RX ADMIN — SODIUM CHLORIDE 25 ML/HR: 900 INJECTION, SOLUTION INTRAVENOUS at 09:40

## 2019-06-12 RX ADMIN — POTASSIUM CHLORIDE: 2 INJECTION, SOLUTION, CONCENTRATE INTRAVENOUS at 17:30

## 2019-06-12 RX ADMIN — PEGFILGRASTIM 6 MG: KIT SUBCUTANEOUS at 17:30

## 2019-06-12 RX ADMIN — Medication 10 ML: at 18:31

## 2019-06-12 RX ADMIN — DIPHENHYDRAMINE HYDROCHLORIDE 50 MG: 25 CAPSULE ORAL at 09:41

## 2019-06-12 RX ADMIN — POTASSIUM CHLORIDE: 2 INJECTION, SOLUTION, CONCENTRATE INTRAVENOUS at 12:15

## 2019-06-12 RX ADMIN — DEXAMETHASONE SODIUM PHOSPHATE 12 MG: 4 INJECTION, SOLUTION INTRAMUSCULAR; INTRAVENOUS at 09:51

## 2019-06-12 RX ADMIN — PACLITAXEL 329 MG: 6 INJECTION, SOLUTION INTRAVENOUS at 12:19

## 2019-06-12 RX ADMIN — ONDANSETRON 8 MG: 2 INJECTION, SOLUTION INTRAMUSCULAR; INTRAVENOUS at 09:41

## 2019-06-12 RX ADMIN — FAMOTIDINE 20 MG: 10 INJECTION, SOLUTION INTRAVENOUS at 09:42

## 2019-06-12 NOTE — PROGRESS NOTES
Pharmacy Note- Chemotherapy Education    Sha Avila is a  48 y. o.female  diagnosed with cervical cancer here today for Cycle 1, Day 1 chemotherapy counseling. Ms. Haily Corrales is being treated with PACLItaxel/CISplatin and bevacizumab. Provided education on PACLItaxel/CISplatin and bevacizumab and premedications - fosaprepitant, ondansetron, dexamethasone, diphenhydramine and famotidine. Side effects of chemotherapy reviewed included s/s infection, anemia, appetite changes, thromboyctopenia, fatigue, hair loss/alopecia, bone pain, skin and nail changes, diarrhea/constipation, bleeding/blood clots, hypertension, electrolyte abnormalities and infusion reactions. Patient given ways to manage these side effects and when to contact office. [address] Handout of medications provided to patient. Ms. Haily Corrales verbalized understanding of the information presented and all of the patient's questions were answered.     Theresa Quiroga, PharmD, BCPS

## 2019-06-12 NOTE — PROGRESS NOTES
[facility] - Chemotherapy Progress Note    0830 Pt admit to [facility] for C1 Avastin/Taxol/Cisplatin ambulatory in stable condition. Assessment completed and charted, New port in left chest, steristrips in place, accessed per protocol with positive blood return, labs drawn prior to visit. Chemotherapy Flowsheet 6/12/2019   Cycle C1   Date 6/12/2019   Drug / Regimen Avastin/Taxol/Cisplatin   Pre Hydration given   Post Hydration given   Pre Meds given   Notes given         Visit Vitals  /71   Pulse 77   Temp 97 °F (36.1 °C)   Resp 18   Ht 5' 3\" (1.6 m)   Wt 80.3 kg (177 lb)   Breastfeeding? No   BMI 31.35 kg/m²       Medications:  NS IV @ kvo  Benadryl PO  Decadron IVP  Pepcid IVP  Zofran IVP  Emend IV  Avastin IV  1 liter NS w/mg and k IV-prehydration  Taxol IV  Cisplatin IV  1 liter NS w/mg and k IV-prehydration  Neulasta SC OBI-right arm  *education and instructions given    1835 Pt tolerated treatment well. Port maintained positive blood return throughout treatment. Flushed, heparinized and de-accessed per protocol. D/c home ambulatory in no distress. Pt aware of next appointment scheduled for 7/3/19.

## 2019-06-26 RX ORDER — ALBUTEROL SULFATE 0.83 MG/ML
2.5 SOLUTION RESPIRATORY (INHALATION) AS NEEDED
Status: CANCELLED
Start: 2019-07-03

## 2019-06-26 RX ORDER — DIPHENHYDRAMINE HYDROCHLORIDE 50 MG/ML
50 INJECTION, SOLUTION INTRAMUSCULAR; INTRAVENOUS AS NEEDED
Status: CANCELLED
Start: 2019-07-03

## 2019-06-26 RX ORDER — SODIUM CHLORIDE 0.9 % (FLUSH) 0.9 %
10 SYRINGE (ML) INJECTION AS NEEDED
Status: CANCELLED
Start: 2019-07-03

## 2019-06-26 RX ORDER — ONDANSETRON 2 MG/ML
8 INJECTION INTRAMUSCULAR; INTRAVENOUS ONCE
Status: CANCELLED | OUTPATIENT
Start: 2019-07-03

## 2019-06-26 RX ORDER — HYDROCORTISONE SODIUM SUCCINATE 100 MG/2ML
100 INJECTION, POWDER, FOR SOLUTION INTRAMUSCULAR; INTRAVENOUS AS NEEDED
Status: CANCELLED | OUTPATIENT
Start: 2019-07-03

## 2019-06-26 RX ORDER — ACETAMINOPHEN 325 MG/1
650 TABLET ORAL AS NEEDED
Status: CANCELLED
Start: 2019-07-03

## 2019-06-26 RX ORDER — HEPARIN 100 UNIT/ML
300-500 SYRINGE INTRAVENOUS AS NEEDED
Status: CANCELLED
Start: 2019-07-03

## 2019-06-26 RX ORDER — EPINEPHRINE 1 MG/ML
0.3 INJECTION, SOLUTION, CONCENTRATE INTRAVENOUS AS NEEDED
Status: CANCELLED | OUTPATIENT
Start: 2019-07-03

## 2019-06-26 RX ORDER — ONDANSETRON 2 MG/ML
8 INJECTION INTRAMUSCULAR; INTRAVENOUS AS NEEDED
Status: CANCELLED | OUTPATIENT
Start: 2019-07-03

## 2019-06-26 RX ORDER — SODIUM CHLORIDE 9 MG/ML
10 INJECTION INTRAMUSCULAR; INTRAVENOUS; SUBCUTANEOUS AS NEEDED
Status: CANCELLED | OUTPATIENT
Start: 2019-07-03

## 2019-06-26 RX ORDER — SODIUM CHLORIDE 9 MG/ML
25 INJECTION, SOLUTION INTRAVENOUS CONTINUOUS
Status: CANCELLED | OUTPATIENT
Start: 2019-07-03

## 2019-06-26 RX ORDER — DIPHENHYDRAMINE HYDROCHLORIDE 50 MG/ML
50 INJECTION, SOLUTION INTRAMUSCULAR; INTRAVENOUS ONCE
Status: CANCELLED | OUTPATIENT
Start: 2019-07-03

## 2019-07-01 DIAGNOSIS — C53.8 MALIGNANT NEOPLASM OF OVERLAPPING SITES OF CERVIX (HCC): Primary | ICD-10-CM

## 2019-07-02 ENCOUNTER — OFFICE VISIT (OUTPATIENT)
Dept: GYNECOLOGY | Age: 54
End: 2019-07-02

## 2019-07-02 VITALS
BODY MASS INDEX: 31.79 KG/M2 | DIASTOLIC BLOOD PRESSURE: 80 MMHG | SYSTOLIC BLOOD PRESSURE: 100 MMHG | HEIGHT: 63 IN | WEIGHT: 179.4 LBS | HEART RATE: 80 BPM

## 2019-07-02 DIAGNOSIS — C53.8 MALIGNANT NEOPLASM OF OVERLAPPING SITES OF CERVIX (HCC): Primary | ICD-10-CM

## 2019-07-02 NOTE — PROGRESS NOTES
[address] [place] [address] [address], [address] [facility]  P [phone]  F [phone]    Office Note  Patient ID:  Name:  Shhaab Garza  MRN:  [id number]  :  1965/53 y.o. Date:  2019      HISTORY OF PRESENT ILLNESS:  Shahab Garza is a 48 y.o.  perimenopausal female who is being seen for at least sever cervical dysplasia. She is referred by Dr. Annabella Eisenberg. Her most recent pap smear was read as HGSIL and she was high-risk HPV positive. She underwent subsequent colposcopy with biopsy. This revealed ZBIGNIEW 2-3, but an invasive process could not be excluded. On a pelvic ultrasound she was noted to have a complex 4.1 cm right adnexal mass and a 3.7 cm complex mass in the cervix. I have been asked to see her in consultation for further evaluation and management. I recommended an exam under anesthesia with cervical biopsy, cystoscopy, proctoscopy. This was performed on 19. Operative findings:  Replacement of cervix with carcinoma, with extension into proximal anterior vagina.  Parametrial thickening bilaterally.  Uterus still somewhat mobile.  No appreciable disease in the bladder or rectum. FINAL PATHOLOGIC DIAGNOSIS   Cervix, biopsy:   Invasive squamous cell carcinoma   Comment   The case is also seen by Dr. Rubi Maza who concurs with the findings. The results are communicated with Dr. Kurt Orozco nurse, Bryan Verma at approximately 1:30pm on 2019. Following the procedure and confirmation of the diagnosis of cancer, I sent her for a PET/CT. This demonstrated stage IVB disease. I explained that she has metastatic disease and she is not a candidate for surgical resection or curative radiation therapy. I discussed with them that the standard of care would be systemic chemotherapy, consisting of Taxol/Cisplatin/Avastin. If she has a good response, we may reconsider pelvic radiation for local control.   It may also be necessary for control of bleeding, if that becomes a significant issue. She is currently receiving Taxol/Cisplatin/Avastin chemotherapy. She has completed one cycle so far. She is tolerating well. She is starting to lose her hair. Her appetite is good and she has minimal nausea. Her pain is better and she reports that the bleeding has stopped. ROS:   and GI review:  Negative  Cardiopulmonary review:  Negative   Musculoskeletal:  Negative    A comprehensive review of systems was negative except for that written in the History of Present Illness. , 10 point ROS      OB/GYN ROS:  There is no history of significant gyn problems or procedures. Problem List:  Patient Active Problem List    Diagnosis Date Noted    Chemotherapy induced neutropenia ([facility] 2019    Malignant neoplasm of overlapping sites of cervix ([facility] 2019    Complex cyst of right ovary 2019     PMH:  Past Medical History:   Diagnosis Date    Cancer ([facility] 2019    CEVICAL    Rheumatoid arthritis ([facility]       PSH:  Past Surgical History:   Procedure Laterality Date    HX  SECTION  1997    HX COLPOSCOPY  2019    HGSIL      Social History:  Social History     Tobacco Use    Smoking status: Former Smoker     Packs/day: 0.50     Years: 2.00     Pack years: 1.00     Last attempt to quit: 1991     Years since quittin.1    Smokeless tobacco: Never Used   Substance Use Topics    Alcohol use: Yes     Comment: OCCASIONALLY      Family History:  Family History   Problem Relation Age of Onset    No Known Problems Mother     Other Father         ACCIDENTAL DEATH    Thyroid Disease Brother     Anesth Problems Neg Hx       Medications: (reviewed)  Current Outpatient Medications   Medication Sig    ondansetron (ZOFRAN ODT) 4 mg disintegrating tablet Take 1 Tab by mouth every six (6) hours as needed for Nausea.  Indications: Nausea and Vomiting caused by Cancer Drugs    lidocaine-prilocaine (EMLA) topical cream Apply small amount over port area one hour before chemo treatment and cover with a Band-Aid    dexamethasone (DECADRON) 4 mg tablet Take 2 tablets by mouth with breakfast the day before chemo also take 2 tablets with breakfast for 2 days after chemotherapy    OTHER \"MANY VITAMINS AND SUPPLEMENTS\"    naproxen (NAPROSYN) 500 mg tablet TAKE 1 TABLET BY MOUTH EVERY 8 HOURS FOR 48 HOURS. TAKING MED AS NEEDED    ibuprofen (ADVIL) 200 mg tablet Take 400 mg by mouth every eight (8) hours as needed for Pain. No current facility-administered medications for this visit. Allergies: (reviewed)  No Known Allergies       OBJECTIVE:    Physical Exam:  VITAL SIGNS: Vitals:    07/02/19 1017   BP: 100/80   Pulse: 80   Weight: 179 lb 6.4 oz (81.4 kg)   Height: 5' 2.99\" (1.6 m)     Body mass index is 31.79 kg/m². GENERAL FAUSTINA: Conversant, alert, oriented. No acute distress. HEENT: HEENT. No thyroid enlargement. No JVD. Neck: Supple without restrictions. RESPIRATORY: Clear to auscultation and percussion to the bases. No CVAT. CARDIOVASC: RRR without murmur/rub. GASTROINT: soft, non-tender, without masses or organomegaly   MUSCULOSKEL: no joint tenderness, deformity or swelling   EXTREMITIES: extremities normal, atraumatic, no cyanosis or edema   PELVIC: Deferred   RECTAL: Deferred   KEIRY SURVEY: No suspicious lymphadenopathy or edema noted. NEURO: Grossly intact. No acute deficit.        Lab Data:    Lab Results   Component Value Date/Time    WBC 4.9 06/07/2019 10:50 AM    HGB 11.1 06/07/2019 10:50 AM    HCT 35.4 06/07/2019 10:50 AM    PLATELET 381 70/23/2759 10:50 AM    MCV 87 06/07/2019 10:50 AM     Lab Results   Component Value Date/Time    Sodium 143 06/07/2019 10:50 AM    Potassium 4.4 06/07/2019 10:50 AM    Chloride 103 06/07/2019 10:50 AM    CO2 26 06/07/2019 10:50 AM    Anion gap 8 05/20/2019 12:14 PM    Glucose 85 06/07/2019 10:50 AM    BUN 7 06/07/2019 10:50 AM    Creatinine 0.52 (L) 06/07/2019 10:50 AM    BUN/Creatinine ratio 13 06/07/2019 10:50 AM    GFR est  06/07/2019 10:50 AM    GFR est non- 06/07/2019 10:50 AM    Calcium 9.2 06/07/2019 10:50 AM         Imaging: Outside pelvic ultrasound reviewed. Pertinent findings noted in HPI.         PET/CT (5/21/19)  HEAD/NECK: There is 1.8 cm left level 4 lymph node with increased metabolic  activity of 11.     CHEST: No foci of abnormal hypermetabolism. Low-grade activity in a normal right  axillary lymph node is most likely physiologic.     ABDOMEN/PELVIS: There is a 1 cm retrocrural lymph node on the right with  increased metabolic activity of 5.      There is periaortic adenopathy measuring 2 cm with increased metabolic activity  of 9.     There is an enlarged lymph node at the aortic bifurcation with increased  metabolic activity.       There is a complex appearing mass right pelvic sidewall measuring 3.6 cm with  increased metabolic activity of 34.0. There is left iliac adenopathy with increased metabolic activity of 5.7.     There is a 1.4 cm soft tissue nodule intraperitoneal left lower quadrant with  increased metabolic activity of 7.     There is a mass in the cervix with increased metabolic activity of 12  There is right hydronephrosis.     SKELETON: No foci of abnormal hypermetabolism in the axial and visualized  appendicular skeleton.     IMPRESSION:   There is increased metabolic activity in a cervical mass consistent with  known primary neoplasm. There is adenopathy involving right pelvic sidewall, left iliac chain,  para-aortic chain, intra-abdominal nodule left lower quadrant, retrocrural lymph  node and left supra clavicular lymph node with increased metabolic activity  consistent with metastatic disease. The cervical mass appears to be obstructing  the right ureter resulting in moderate right hydroureteronephrosis. IMPRESSION/PLAN:  Maurizio Mujica is a 48 y.o. 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   We previously discussed the pathology and I reviewed the PET images with her and her . I explained that she has metastatic disease and she is not a candidate for surgical resection or curative radiation therapy. I discussed with them that the standard of care would be systemic chemotherapy, consisting of Taxol/Cisplatin/Avastin. She was counseled on the expected side effects and potential toxicities of the regimen. Her questions were answered and she wished to proceed. If she has a good response, we may reconsider pelvic radiation for local control. It may also be necessary for control of bleeding, if that becomes a significant issue. She is currently receiving Taxol/Cisplatin/Avastin chemotherapy. She has completed one cycle. She is tolerating well so far. We will continue with the current regimen and repeat a scan after another couple of cycles.         Signed By: Sera Pappas MD     7/2/2019/9:50 AM

## 2019-07-03 ENCOUNTER — HOSPITAL ENCOUNTER (OUTPATIENT)
Dept: INFUSION THERAPY | Age: 54
Discharge: HOME | End: 2019-07-03
Payer: COMMERCIAL

## 2019-07-03 VITALS
SYSTOLIC BLOOD PRESSURE: 129 MMHG | RESPIRATION RATE: 18 BRPM | WEIGHT: 180.6 LBS | OXYGEN SATURATION: 100 % | TEMPERATURE: 97 F | DIASTOLIC BLOOD PRESSURE: 86 MMHG | BODY MASS INDEX: 32 KG/M2 | HEIGHT: 63 IN | HEART RATE: 70 BPM

## 2019-07-03 DIAGNOSIS — C53.8 MALIGNANT NEOPLASM OF OVERLAPPING SITES OF CERVIX (HCC): ICD-10-CM

## 2019-07-03 DIAGNOSIS — D70.1 CHEMOTHERAPY INDUCED NEUTROPENIA (HCC): Primary | ICD-10-CM

## 2019-07-03 DIAGNOSIS — T45.1X5A CHEMOTHERAPY INDUCED NEUTROPENIA (HCC): Primary | ICD-10-CM

## 2019-07-03 PROBLEM — N13.4 HYDROURETER, RIGHT: Status: ACTIVE | Noted: 2019-07-03

## 2019-07-03 PROBLEM — C77.9 METASTATIC SQUAMOUS CELL CARCINOMA TO LYMPH NODE (HCC): Status: ACTIVE | Noted: 2019-07-03

## 2019-07-03 PROBLEM — Z79.899 ON ANTINEOPLASTIC CHEMOTHERAPY: Status: ACTIVE | Noted: 2019-07-03

## 2019-07-03 LAB
ALBUMIN SERPL-MCNC: 3.5 G/DL (ref 3.5–5)
ALBUMIN SERPL-MCNC: 4.3 G/DL (ref 3.5–5.5)
ALBUMIN/GLOB SERPL: 0.8 {RATIO} (ref 1.1–2.2)
ALBUMIN/GLOB SERPL: 1.4 {RATIO} (ref 1.2–2.2)
ALP SERPL-CCNC: 115 U/L (ref 45–117)
ALP SERPL-CCNC: 92 IU/L (ref 39–117)
ALT SERPL-CCNC: 26 IU/L (ref 0–32)
ALT SERPL-CCNC: 36 U/L (ref 12–78)
ANION GAP SERPL CALC-SCNC: 4 MMOL/L (ref 5–15)
APPEARANCE UR: CLEAR
APPEARANCE UR: CLEAR
AST SERPL-CCNC: 19 U/L (ref 15–37)
AST SERPL-CCNC: 26 IU/L (ref 0–40)
BACTERIA #/AREA URNS HPF: ABNORMAL /[HPF]
BACTERIA URNS QL MICRO: NEGATIVE /HPF
BASOPHILS # BLD AUTO: 0 X10E3/UL (ref 0–0.2)
BASOPHILS # BLD: 0 K/UL (ref 0–0.1)
BASOPHILS NFR BLD AUTO: 0 %
BASOPHILS NFR BLD: 0 % (ref 0–1)
BILIRUB SERPL-MCNC: 0.2 MG/DL (ref 0.2–1)
BILIRUB SERPL-MCNC: 0.3 MG/DL (ref 0–1.2)
BILIRUB UR QL STRIP: NEGATIVE
BILIRUB UR QL: NEGATIVE
BUN SERPL-MCNC: 10 MG/DL (ref 6–24)
BUN SERPL-MCNC: 16 MG/DL (ref 6–20)
BUN/CREAT SERPL: 23 (ref 9–23)
BUN/CREAT SERPL: 30 (ref 12–20)
CALCIUM SERPL-MCNC: 9 MG/DL (ref 8.5–10.1)
CALCIUM SERPL-MCNC: 9.3 MG/DL (ref 8.7–10.2)
CANCER AG125 SERPL-ACNC: 54 U/ML (ref 1.5–35)
CANCER AG125 SERPL-ACNC: 63.4 U/ML (ref 0–38.1)
CHLORIDE SERPL-SCNC: 102 MMOL/L (ref 96–106)
CHLORIDE SERPL-SCNC: 109 MMOL/L (ref 97–108)
CO2 SERPL-SCNC: 25 MMOL/L (ref 20–29)
CO2 SERPL-SCNC: 26 MMOL/L (ref 21–32)
COLOR UR: ABNORMAL
COLOR UR: YELLOW
COMMENT, HOLDF: NORMAL
CREAT SERPL-MCNC: 0.44 MG/DL (ref 0.57–1)
CREAT SERPL-MCNC: 0.54 MG/DL (ref 0.55–1.02)
DIFFERENTIAL METHOD BLD: ABNORMAL
EOSINOPHIL # BLD AUTO: 0.1 X10E3/UL (ref 0–0.4)
EOSINOPHIL # BLD: 0 K/UL (ref 0–0.4)
EOSINOPHIL NFR BLD AUTO: 2 %
EOSINOPHIL NFR BLD: 0 % (ref 0–7)
EPI CELLS #/AREA URNS HPF: ABNORMAL /HPF (ref 0–10)
EPITH CASTS URNS QL MICRO: ABNORMAL /LPF
ERYTHROCYTE [DISTWIDTH] IN BLOOD BY AUTOMATED COUNT: 15.9 % (ref 11.5–14.5)
ERYTHROCYTE [DISTWIDTH] IN BLOOD BY AUTOMATED COUNT: 15.9 % (ref 12.3–15.4)
GLOBULIN SER CALC-MCNC: 3 G/DL (ref 1.5–4.5)
GLOBULIN SER CALC-MCNC: 4.2 G/DL (ref 2–4)
GLUCOSE SERPL-MCNC: 73 MG/DL (ref 65–99)
GLUCOSE SERPL-MCNC: 98 MG/DL (ref 65–100)
GLUCOSE UR QL: NEGATIVE
GLUCOSE UR STRIP.AUTO-MCNC: NEGATIVE MG/DL
HCT VFR BLD AUTO: 35 % (ref 35–47)
HCT VFR BLD AUTO: 35.5 % (ref 34–46.6)
HGB BLD-MCNC: 11 G/DL (ref 11.5–16)
HGB BLD-MCNC: 12.1 G/DL (ref 11.1–15.9)
HGB UR QL STRIP: ABNORMAL
HGB UR QL STRIP: ABNORMAL
HYALINE CASTS URNS QL MICRO: ABNORMAL /LPF (ref 0–5)
IMM GRANULOCYTES # BLD AUTO: 0 K/UL (ref 0–0.04)
IMM GRANULOCYTES NFR BLD AUTO: 0 % (ref 0–0.5)
KETONES UR QL STRIP.AUTO: NEGATIVE MG/DL
KETONES UR QL STRIP: NEGATIVE
LEUKOCYTE ESTERASE UR QL STRIP.AUTO: ABNORMAL
LEUKOCYTE ESTERASE UR QL STRIP: ABNORMAL
LYMPHOCYTES # BLD AUTO: 1 X10E3/UL (ref 0.7–3.1)
LYMPHOCYTES # BLD: 1.5 K/UL (ref 0.8–3.5)
LYMPHOCYTES NFR BLD AUTO: 26 %
LYMPHOCYTES NFR BLD: 22 % (ref 12–49)
MAGNESIUM SERPL-MCNC: 2 MG/DL (ref 1.6–2.3)
MAGNESIUM SERPL-MCNC: 2 MG/DL (ref 1.6–2.4)
MCH RBC QN AUTO: 27.6 PG (ref 26.6–33)
MCH RBC QN AUTO: 27.7 PG (ref 26–34)
MCHC RBC AUTO-ENTMCNC: 31.4 G/DL (ref 30–36.5)
MCHC RBC AUTO-ENTMCNC: 34.1 G/DL (ref 31.5–35.7)
MCV RBC AUTO: 81 FL (ref 79–97)
MCV RBC AUTO: 88.2 FL (ref 80–99)
MICRO URNS: ABNORMAL
MONOCYTES # BLD AUTO: 0.4 X10E3/UL (ref 0.1–0.9)
MONOCYTES # BLD: 0.7 K/UL (ref 0–1)
MONOCYTES NFR BLD AUTO: 11 %
MONOCYTES NFR BLD: 10 % (ref 5–13)
NEUTROPHILS # BLD AUTO: 2.5 X10E3/UL (ref 1.4–7)
NEUTROPHILS NFR BLD AUTO: 61 %
NEUTS SEG # BLD: 4.6 K/UL (ref 1.8–8)
NEUTS SEG NFR BLD: 68 % (ref 32–75)
NITRITE UR QL STRIP.AUTO: NEGATIVE
NITRITE UR QL STRIP: NEGATIVE
NRBC # BLD: 0 K/UL (ref 0–0.01)
NRBC BLD-RTO: 0 PER 100 WBC
PH UR STRIP: 6 [PH] (ref 5–8)
PH UR STRIP: 7.5 [PH] (ref 5–7.5)
PHOSPHATE SERPL-MCNC: 2.6 MG/DL (ref 2.6–4.7)
PLATELET # BLD AUTO: 268 K/UL (ref 150–400)
PLATELET # BLD AUTO: 285 X10E3/UL (ref 150–450)
PMV BLD AUTO: 9.9 FL (ref 8.9–12.9)
POTASSIUM SERPL-SCNC: 4.1 MMOL/L (ref 3.5–5.1)
POTASSIUM SERPL-SCNC: 4.8 MMOL/L (ref 3.5–5.2)
PROT SERPL-MCNC: 7.3 G/DL (ref 6–8.5)
PROT SERPL-MCNC: 7.7 G/DL (ref 6.4–8.2)
PROT UR QL STRIP: NEGATIVE
PROT UR STRIP-MCNC: NEGATIVE MG/DL
RBC # BLD AUTO: 3.97 M/UL (ref 3.8–5.2)
RBC # BLD AUTO: 4.38 X10E6/UL (ref 3.77–5.28)
RBC #/AREA URNS HPF: ABNORMAL /HPF (ref 0–2)
RBC #/AREA URNS HPF: ABNORMAL /HPF (ref 0–5)
RENAL EPI CELLS #/AREA URNS HPF: ABNORMAL /HPF
SAMPLES BEING HELD,HOLD: NORMAL
SODIUM SERPL-SCNC: 139 MMOL/L (ref 136–145)
SODIUM SERPL-SCNC: 141 MMOL/L (ref 134–144)
SP GR UR REFRACTOMETRY: 1.01 (ref 1–1.03)
SP GR UR: 1.01 (ref 1–1.03)
UROBILINOGEN UR QL STRIP.AUTO: 0.2 EU/DL (ref 0.2–1)
UROBILINOGEN UR STRIP-MCNC: 0.2 MG/DL (ref 0.2–1)
WBC # BLD AUTO: 4 X10E3/UL (ref 3.4–10.8)
WBC # BLD AUTO: 6.8 K/UL (ref 3.6–11)
WBC #/AREA URNS HPF: ABNORMAL /HPF (ref 0–5)
WBC URNS QL MICRO: ABNORMAL /HPF (ref 0–4)

## 2019-07-03 PROCEDURE — 96415 CHEMO IV INFUSION ADDL HR: CPT

## 2019-07-03 PROCEDURE — 86304 IMMUNOASSAY TUMOR CA 125: CPT

## 2019-07-03 PROCEDURE — 96417 CHEMO IV INFUS EACH ADDL SEQ: CPT

## 2019-07-03 PROCEDURE — 96413 CHEMO IV INFUSION 1 HR: CPT

## 2019-07-03 PROCEDURE — 96375 TX/PRO/DX INJ NEW DRUG ADDON: CPT

## 2019-07-03 PROCEDURE — 77030012965 HC NDL HUBR BBMI -A

## 2019-07-03 PROCEDURE — 74011250636 HC RX REV CODE- 250/636: Performed by: OBSTETRICS & GYNECOLOGY

## 2019-07-03 PROCEDURE — 96361 HYDRATE IV INFUSION ADD-ON: CPT

## 2019-07-03 PROCEDURE — 36415 COLL VENOUS BLD VENIPUNCTURE: CPT

## 2019-07-03 PROCEDURE — 83735 ASSAY OF MAGNESIUM: CPT

## 2019-07-03 PROCEDURE — 74011250637 HC RX REV CODE- 250/637: Performed by: OBSTETRICS & GYNECOLOGY

## 2019-07-03 PROCEDURE — 96366 THER/PROPH/DIAG IV INF ADDON: CPT

## 2019-07-03 PROCEDURE — 85025 COMPLETE CBC W/AUTO DIFF WBC: CPT

## 2019-07-03 PROCEDURE — 84100 ASSAY OF PHOSPHORUS: CPT

## 2019-07-03 PROCEDURE — 96377 APPLICATON ON-BODY INJECTOR: CPT

## 2019-07-03 PROCEDURE — 88112 CYTOPATH CELL ENHANCE TECH: CPT

## 2019-07-03 PROCEDURE — 80053 COMPREHEN METABOLIC PANEL: CPT

## 2019-07-03 PROCEDURE — 81001 URINALYSIS AUTO W/SCOPE: CPT

## 2019-07-03 PROCEDURE — 74011000258 HC RX REV CODE- 258: Performed by: OBSTETRICS & GYNECOLOGY

## 2019-07-03 PROCEDURE — 96360 HYDRATION IV INFUSION INIT: CPT

## 2019-07-03 PROCEDURE — 96367 TX/PROPH/DG ADDL SEQ IV INF: CPT

## 2019-07-03 RX ORDER — SODIUM CHLORIDE 0.9 % (FLUSH) 0.9 %
10 SYRINGE (ML) INJECTION AS NEEDED
Status: ACTIVE | OUTPATIENT
Start: 2019-07-03 | End: 2019-07-03

## 2019-07-03 RX ORDER — DIPHENHYDRAMINE HYDROCHLORIDE 50 MG/ML
50 INJECTION, SOLUTION INTRAMUSCULAR; INTRAVENOUS ONCE
Status: DISCONTINUED | OUTPATIENT
Start: 2019-07-03 | End: 2019-07-03

## 2019-07-03 RX ORDER — ONDANSETRON 2 MG/ML
8 INJECTION INTRAMUSCULAR; INTRAVENOUS ONCE
Status: COMPLETED | OUTPATIENT
Start: 2019-07-03 | End: 2019-07-03

## 2019-07-03 RX ORDER — SODIUM CHLORIDE 9 MG/ML
10 INJECTION INTRAMUSCULAR; INTRAVENOUS; SUBCUTANEOUS AS NEEDED
Status: ACTIVE | OUTPATIENT
Start: 2019-07-03 | End: 2019-07-03

## 2019-07-03 RX ORDER — DIPHENHYDRAMINE HCL 25 MG
50 CAPSULE ORAL ONCE
Status: COMPLETED | OUTPATIENT
Start: 2019-07-03 | End: 2019-07-03

## 2019-07-03 RX ORDER — SODIUM CHLORIDE 9 MG/ML
25 INJECTION, SOLUTION INTRAVENOUS CONTINUOUS
Status: DISPENSED | OUTPATIENT
Start: 2019-07-03 | End: 2019-07-03

## 2019-07-03 RX ORDER — HEPARIN 100 UNIT/ML
300-500 SYRINGE INTRAVENOUS AS NEEDED
Status: ACTIVE | OUTPATIENT
Start: 2019-07-03 | End: 2019-07-03

## 2019-07-03 RX ADMIN — CISPLATIN 94 MG: 1 INJECTION, SOLUTION INTRAVENOUS at 18:09

## 2019-07-03 RX ADMIN — Medication 500 UNITS: at 19:04

## 2019-07-03 RX ADMIN — SODIUM CHLORIDE 150 MG: 900 INJECTION, SOLUTION INTRAVENOUS at 12:19

## 2019-07-03 RX ADMIN — ONDANSETRON 8 MG: 2 INJECTION, SOLUTION INTRAMUSCULAR; INTRAVENOUS at 12:51

## 2019-07-03 RX ADMIN — DIPHENHYDRAMINE HYDROCHLORIDE 50 MG: 25 CAPSULE ORAL at 12:19

## 2019-07-03 RX ADMIN — PACLITAXEL 329 MG: 6 INJECTION, SOLUTION INTRAVENOUS at 14:55

## 2019-07-03 RX ADMIN — Medication 50 ML: at 19:03

## 2019-07-03 RX ADMIN — POTASSIUM CHLORIDE: 2 INJECTION, SOLUTION, CONCENTRATE INTRAVENOUS at 10:56

## 2019-07-03 RX ADMIN — DEXAMETHASONE SODIUM PHOSPHATE 12 MG: 4 INJECTION, SOLUTION INTRAMUSCULAR; INTRAVENOUS at 12:49

## 2019-07-03 RX ADMIN — POTASSIUM CHLORIDE: 2 INJECTION, SOLUTION, CONCENTRATE INTRAVENOUS at 18:05

## 2019-07-03 RX ADMIN — FAMOTIDINE 20 MG: 10 INJECTION INTRAVENOUS at 12:51

## 2019-07-03 RX ADMIN — BEVACIZUMAB 1210 MG: 400 INJECTION, SOLUTION INTRAVENOUS at 13:22

## 2019-07-03 RX ADMIN — PEGFILGRASTIM 6 MG: KIT SUBCUTANEOUS at 19:02

## 2019-07-03 RX ADMIN — SODIUM CHLORIDE 25 ML/HR: 900 INJECTION, SOLUTION INTRAVENOUS at 12:18

## 2019-07-03 NOTE — PROGRESS NOTES
[facility] Progress Note    Date: July 3, 2019    Name: Sha Avila    MRN: [id number]         : 1965    Ms. Dela Cruz Arrived ambulatory and in no distress for cycle 2 day 1 of chemo regimen. Assessment was completed, no acute issues at this time, no new complaints voiced. L chest PAC  accessed without difficulty, labs drawn and in process. Chemotherapy Flowsheet 7/3/2019   Cycle C2D1   Date 7/3/2019   Drug / Regimen Avastin/Taxol/Cisplatin   Pre Hydration given   Post Hydration given   Pre Meds -   Notes -           Ms. Dela Cruz's vitals were reviewed. Visit Vitals  /80 (BP 1 Location: Right arm, BP Patient Position: Sitting)   Pulse 86   Temp 97 °F (36.1 °C)   Resp 18   Ht 5' 3\" (1.6 m)   Wt 81.9 kg (180 lb 9.6 oz)   SpO2 99%   Breastfeeding? No   BMI 31.99 kg/m²       Lab results were obtained and reviewed. Recent Results (from the past 12 hour(s))   CBC WITH AUTOMATED DIFF    Collection Time: 19  8:40 AM   Result Value Ref Range    WBC 6.8 3.6 - 11.0 K/uL    RBC 3.97 3.80 - 5.20 M/uL    HGB 11.0 (L) 11.5 - 16.0 g/dL    HCT 35.0 35.0 - 47.0 %    MCV 88.2 80.0 - 99.0 FL    MCH 27.7 26.0 - 34.0 PG    MCHC 31.4 30.0 - 36.5 g/dL    RDW 15.9 (H) 11.5 - 14.5 %    PLATELET 519 822 - 186 K/uL    MPV 9.9 8.9 - 12.9 FL    NRBC 0.0 0  WBC    ABSOLUTE NRBC 0.00 0.00 - 0.01 K/uL    NEUTROPHILS 68 32 - 75 %    LYMPHOCYTES 22 12 - 49 %    MONOCYTES 10 5 - 13 %    EOSINOPHILS 0 0 - 7 %    BASOPHILS 0 0 - 1 %    IMMATURE GRANULOCYTES 0 0.0 - 0.5 %    ABS. NEUTROPHILS 4.6 1.8 - 8.0 K/UL    ABS. LYMPHOCYTES 1.5 0.8 - 3.5 K/UL    ABS. MONOCYTES 0.7 0.0 - 1.0 K/UL    ABS. EOSINOPHILS 0.0 0.0 - 0.4 K/UL    ABS. BASOPHILS 0.0 0.0 - 0.1 K/UL    ABS. IMM.  GRANS. 0.0 0.00 - 0.04 K/UL    DF AUTOMATED     METABOLIC PANEL, COMPREHENSIVE    Collection Time: 19  8:40 AM   Result Value Ref Range    Sodium 139 136 - 145 mmol/L    Potassium 4.1 3.5 - 5.1 mmol/L    Chloride 109 (H) 97 - 108 mmol/L    CO2 26 21 - 32 mmol/L    Anion gap 4 (L) 5 - 15 mmol/L    Glucose 98 65 - 100 mg/dL    BUN 16 6 - 20 MG/DL    Creatinine 0.54 (L) 0.55 - 1.02 MG/DL    BUN/Creatinine ratio 30 (H) 12 - 20      GFR est AA >60 >60 ml/min/1.73m2    GFR est non-AA >60 >60 ml/min/1.73m2    Calcium 9.0 8.5 - 10.1 MG/DL    Bilirubin, total 0.2 0.2 - 1.0 MG/DL    ALT (SGPT) 36 12 - 78 U/L    AST (SGOT) 19 15 - 37 U/L    Alk. phosphatase 115 45 - 117 U/L    Protein, total 7.7 6.4 - 8.2 g/dL    Albumin 3.5 3.5 - 5.0 g/dL    Globulin 4.2 (H) 2.0 - 4.0 g/dL    A-G Ratio 0.8 (L) 1.1 - 2.2     MAGNESIUM    Collection Time: 07/03/19  8:40 AM   Result Value Ref Range    Magnesium 2.0 1.6 - 2.4 mg/dL   URINALYSIS W/ RFLX MICROSCOPIC    Collection Time: 07/03/19  8:40 AM   Result Value Ref Range    Color YELLOW/STRAW      Appearance CLEAR CLEAR      Specific gravity 1.011 1.003 - 1.030      pH (UA) 6.0 5.0 - 8.0      Protein NEGATIVE  NEG mg/dL    Glucose NEGATIVE  NEG mg/dL    Ketone NEGATIVE  NEG mg/dL    Bilirubin NEGATIVE  NEG      Blood LARGE (A) NEG      Urobilinogen 0.2 0.2 - 1.0 EU/dL    Nitrites NEGATIVE  NEG      Leukocyte Esterase SMALL (A) NEG      WBC 10-20 0 - 4 /hpf    RBC 20-50 0 - 5 /hpf    Epithelial cells FEW FEW /lpf    Bacteria NEGATIVE  NEG /hpf    Hyaline cast 2-5 0 - 5 /lpf   PHOSPHORUS    Collection Time: 07/03/19  8:40 AM   Result Value Ref Range    Phosphorus 2.6 2.6 - 4.7 MG/DL   CANCER ANTIGEN 125    Collection Time: 07/03/19  8:40 AM   Result Value Ref Range    CA-125 54 (H) 1.5 - 35.0 U/mL       Pre-medications  were administered as ordered and chemotherapy was initiated. NS 25cc/hr  1L NS with 10meq KCl and 2gm magnesium pre-hydration and post-hydration  Emend  Zofran  Decadron  Benadryl  Pepcid  Avastin over 1hr  Taxol over 3 hrs  Cisplatin over 2hrs  Neulasta OB        Ms. Haily Corrales tolerated treatment well. PAC flushed per protocol and Ellijessica Conde. Pt was discharged from [place] [address] in stable condition at 2015.  She is to return on 7/24/29 at 0800  for her next appointment.     Kathleen Alcaraz  July 3, 2019

## 2019-07-03 NOTE — PROGRESS NOTES
[facility] GYNECOLOGIC ONCOLOGY  [address] [facility], [address]  P [phone]      7/4/2019   [facility] MD: Brian Mcneil MD  PCP: None     Primary Onc Dx: cervical cancer, stage IVB  Date of Dx: May 2019        HPI:  48 y.o. [place] with a new dx of advanced cervical cancer s/p workup for cervical dysplasia including colpo and EUA on 5/22/19. Biopsies there showed an invasive SCC. Subsequent PET scan revealed metastatic disease     OncTx History:  5/11/19 Colpo ZBIGNIEW 2-3, cannot r/o invasive component   5/22/19 Exam under anesthesia, cystoscopy, proctoscopy, cervical biopsy   Cervix, biopsy:  Invasive squamous cell carcinoma    5/21/19 PET/CT     HEAD/NECK: There is 1.8 cm left level 4 lymph node with increased metabolic activity of 11.     CHEST: No foci of abnormal hypermetabolism. Low-grade activity in a normal right  axillary lymph node is most likely physiologic.     ABDOMEN/PELVIS: There is a 1 cm retrocrural lymph node on the right with  increased metabolic activity of 5. There is periaortic adenopathy measuring 2 cm with increased metabolic activity  of 9. There is an enlarged lymph node at the aortic bifurcation with increased  metabolic activity. There is a complex appearing mass right pelvic sidewall measuring 3.6 cm with  increased metabolic activity of 31.1. There is left iliac adenopathy with increased metabolic activity of 5.7. There is a 1.4 cm soft tissue nodule intraperitoneal left lower quadrant with  increased metabolic activity of 7. There is a mass in the cervix with increased metabolic activity of 12  There is right hydronephrosis.     SKELETON: No foci of abnormal hypermetabolism in the axial and visualized appendicular skeleton.     IMPRESSION:   1. There is increased metabolic activity in a cervical mass consistent with known primary neoplasm.    There is adenopathy involving right pelvic sidewall, left iliac chain,  para-aortic chain, intra-abdominal nodule left lower quadrant, retrocrural lymph  node and left supra clavicular lymph node with increased metabolic activity  consistent with metastatic disease. The cervical mass appears to be obstructing  the right ureter resulting in moderate right hydroureteronephrosis. 6/12/19 Initiated Taxol/Cis/Avastin             Cycle: 2     SUBJECTIVE:  Marline Saenz presents for chemotherapy. She did quite well with her first cycle. A couple days of nausea managed with zofran PRN and her steroid plus fatigue. No emesis, neuropathy or pain. Bowels/bladder normal. Using miralax. She is active and working full time in . She is eating a diet high in beets. She performs all ADLs, lives with . Her college age boys are home for the summer. ROS  Constitutional: no weight loss, fever, night sweats  Respiratory: no cough, shortness of breath, or wheezing  Cardiovascular: no chest pain or dyspnea on exertion  Heme: No abnormal bleeding  Gastrointestinal: no abdominal pain, change in bowel habits, or black or bloody stools  Genito-Urinary: no dysuria, trouble voiding, or hematuria  Musculoskeletal: negative for - joint pain or muscle pain  Neurological: negative for - gait disturbance, headaches, memory loss or numbness/tingling  Derm: negative  Psych: negative for depression       OBJECTIVE:  Physical Exam  Visit Vitals  /86 (BP 1 Location: Left arm, BP Patient Position: Sitting)   Pulse 70   Temp 97 °F (36.1 °C)   Resp 18   Ht 5' 3\" (1.6 m)   Wt 180 lb 9.6 oz (81.9 kg)   SpO2 100%   Breastfeeding?  No   BMI 31.99 kg/m²        General:  alert, cooperative, no distress       HEENT: without pallor, sclera without jaundice, oral mucosa without lesions,      Cardiac:  Regular rate and rhythm        Lungs:  clear to auscultation bilaterally          Port:  clean, dry, no drainage  Abdomen:  soft, non-tender, without masses or organomegaly       Lymph:  no lymphadenopathy   Extremity: extremities normal, atraumatic, no cyanosis or edema  Urine sample is red tinged, but eating a high-beet diet. UA with blood. .. Wt Readings from Last 3 Encounters:   07/03/19 180 lb 9.6 oz (81.9 kg)   07/02/19 179 lb 6.4 oz (81.4 kg)   06/12/19 177 lb (80.3 kg)       Lab Results   Component Value Date/Time    WBC 6.8 07/03/2019 08:40 AM    ABS. NEUTROPHILS 4.6 07/03/2019 08:40 AM    HGB 11.0 (L) 07/03/2019 08:40 AM    HCT 35.0 07/03/2019 08:40 AM    MCV 88.2 07/03/2019 08:40 AM    MCH 27.7 07/03/2019 08:40 AM    PLATELET 934 22/28/6540 08:40 AM     Lab Results   Component Value Date/Time    Sodium 139 07/03/2019 08:40 AM    Potassium 4.1 07/03/2019 08:40 AM    Chloride 109 (H) 07/03/2019 08:40 AM    CO2 26 07/03/2019 08:40 AM    Glucose 98 07/03/2019 08:40 AM    BUN 16 07/03/2019 08:40 AM    Creatinine 0.54 (L) 07/03/2019 08:40 AM    Calcium 9.0 07/03/2019 08:40 AM    Albumin 3.5 07/03/2019 08:40 AM    Bilirubin, total 0.2 07/03/2019 08:40 AM    AST (SGOT) 19 07/03/2019 08:40 AM    ALT (SGPT) 36 07/03/2019 08:40 AM    Alk. phosphatase 115 07/03/2019 08:40 AM     No results found for: HBA1C, HGBE8, FNC6GAJF, KQV1TKYF, QUZ3UTZU  UA blood in urine  Asx, no bacteria/nitrites, no protein. Tumor markers  Lab Results   Component Value Date/Time    CA-125 54 (H) 07/03/2019 08:40 AM    Cancer Ag (CA) 125 63.4 (H) 07/02/2019 10:54 AM     Cancer Ag (CA) 125   Date Value Ref Range Status   07/02/2019 63.4 (H) 0.0 - 38.1 U/mL Final     Comment:     Roche [employer] Electrochemiluminescence Immunoassay (ECLIA)  Values obtained with different assay methods or kits cannot be  used interchangeably. Results cannot be interpreted as absolute  evidence of the presence or absence of malignant disease.          Patient Active Problem List   Diagnosis Code    Complex cyst of right ovary N83.291    Malignant neoplasm of overlapping sites of cervix ([facility] C53.8    Chemotherapy induced neutropenia (HCC) D70.1, T45.1X5A    On antineoplastic chemotherapy Z79.899    Metastatic squamous cell carcinoma to lymph node (HCC) C77.9    Hydroureter, right N13.4     Past Medical History:   Diagnosis Date    Cancer ([facility] 2019    CEVICAL    Rheumatoid arthritis ([facility]      Prior to Admission medications    Medication Sig Start Date End Date Taking? Authorizing Provider   ondansetron (ZOFRAN ODT) 4 mg disintegrating tablet Take 1 Tab by mouth every six (6) hours as needed for Nausea. Indications: Nausea and Vomiting caused by Cancer Drugs 19   Sang Gamble MD   lidocaine-prilocaine (EMLA) topical cream Apply small amount over port area one hour before chemo treatment and cover with a Band-Aid 19   Sang Gamble MD   dexamethasone (DECADRON) 4 mg tablet Take 2 tablets by mouth with breakfast the day before chemo also take 2 tablets with breakfast for 2 days after chemotherapy 19   Sang Gamble MD   OTHER [facility] VITAMINS AND SUPPLEMENTS\"    Provider, Historical   naproxen (NAPROSYN) 500 mg tablet TAKE 1 TABLET BY MOUTH EVERY 8 HOURS FOR 50 HOURS. TAKING MED AS NEEDED 19   Provider, Historical   ibuprofen (ADVIL) 200 mg tablet Take 400 mg by mouth every eight (8) hours as needed for Pain. Provider, Historical     No Known Allergies  Family History   Problem Relation Age of Onset    No Known Problems Mother     Other Father         ACCIDENTAL DEATH    Thyroid Disease Brother     Anesth Problems Neg Hx        IMPRESSION/PLAN:  48 y.o. with a stage IVB SCC of the cervix dx in May 2019. Asx currently.   ECO    Patient Active Problem List   Diagnosis Code    Complex cyst of right ovary N83.291    Malignant neoplasm of overlapping sites of cervix ([facility] C53.8    Chemotherapy induced neutropenia (HCC) D70.1, T45.1X5A    On antineoplastic chemotherapy Z79.899    Metastatic squamous cell carcinoma to lymph node (HCC) C77.9    Hydroureter, right N13.4         Chemotherapy Taxol/Cis/Avastin  Moderate right Hydroureternephrosis, compensated. Hematuria/Leuk est+ on UA. Cycstoscopy negative for tumor. Consider cystitis vs further tumor erosion into lower urinary tract. Sent for cytology. Afeb/nl WBC, asx. Myelosuppression: G1 anemia  CINV: controlled, discussed  Other toxicity: none thus far  Chronic med issues: RA controlled, no swelling  Psychosocial: well supported, discussed ancillary services, [facility], supportive care at home with diet, interventions and ppx. 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 Infection precautions advised. Questions addressed. Advised to call with any concerns. 25 minutes spent with the patient and >50% allotted to direct counseling, discussion and answering questions related to the above.       JEIMY Avery  Gyn Onc

## 2019-07-05 ENCOUNTER — TELEPHONE (OUTPATIENT)
Dept: GYNECOLOGY | Age: 54
End: 2019-07-05

## 2019-07-05 NOTE — TELEPHONE ENCOUNTER
Pt , she called for her repeat urine results from 7/3/19. She states she was told by [employer] on 7/2/19 that there was some blood in her urine and he ordered a repeat on 7/3/19. Patient is concerned regarding the blood in her urine, she states she is having burning after she urinates, please call her at [phone].   (Cassy, she asked me to copy you in on this message as well)

## 2019-07-08 ENCOUNTER — TELEPHONE (OUTPATIENT)
Dept: GYNECOLOGY | Age: 54
End: 2019-07-08

## 2019-07-22 ENCOUNTER — TELEPHONE (OUTPATIENT)
Dept: GYNECOLOGY | Age: 54
End: 2019-07-22

## 2019-07-22 DIAGNOSIS — C53.8 MALIGNANT NEOPLASM OF OVERLAPPING SITES OF CERVIX (HCC): Primary | ICD-10-CM

## 2019-07-23 ENCOUNTER — OFFICE VISIT (OUTPATIENT)
Dept: GYNECOLOGY | Age: 54
End: 2019-07-23

## 2019-07-23 ENCOUNTER — TELEPHONE (OUTPATIENT)
Dept: GYNECOLOGY | Age: 54
End: 2019-07-23

## 2019-07-23 VITALS
BODY MASS INDEX: 31.68 KG/M2 | HEART RATE: 80 BPM | WEIGHT: 178.8 LBS | DIASTOLIC BLOOD PRESSURE: 69 MMHG | SYSTOLIC BLOOD PRESSURE: 111 MMHG | HEIGHT: 63 IN

## 2019-07-23 DIAGNOSIS — C53.8 MALIGNANT NEOPLASM OF OVERLAPPING SITES OF CERVIX (HCC): Primary | ICD-10-CM

## 2019-07-23 RX ORDER — DIPHENHYDRAMINE HYDROCHLORIDE 50 MG/ML
50 INJECTION, SOLUTION INTRAMUSCULAR; INTRAVENOUS AS NEEDED
Status: CANCELLED
Start: 2019-07-24

## 2019-07-23 RX ORDER — EPINEPHRINE 1 MG/ML
0.3 INJECTION, SOLUTION, CONCENTRATE INTRAVENOUS AS NEEDED
Status: CANCELLED | OUTPATIENT
Start: 2019-07-24

## 2019-07-23 RX ORDER — ONDANSETRON 2 MG/ML
8 INJECTION INTRAMUSCULAR; INTRAVENOUS AS NEEDED
Status: CANCELLED | OUTPATIENT
Start: 2019-07-24

## 2019-07-23 RX ORDER — SODIUM CHLORIDE 0.9 % (FLUSH) 0.9 %
10 SYRINGE (ML) INJECTION AS NEEDED
Status: CANCELLED
Start: 2019-07-24

## 2019-07-23 RX ORDER — ACETAMINOPHEN 325 MG/1
650 TABLET ORAL AS NEEDED
Status: CANCELLED
Start: 2019-07-24

## 2019-07-23 RX ORDER — HYDROCORTISONE SODIUM SUCCINATE 100 MG/2ML
100 INJECTION, POWDER, FOR SOLUTION INTRAMUSCULAR; INTRAVENOUS AS NEEDED
Status: CANCELLED | OUTPATIENT
Start: 2019-07-24

## 2019-07-23 RX ORDER — DIPHENHYDRAMINE HYDROCHLORIDE 50 MG/ML
50 INJECTION, SOLUTION INTRAMUSCULAR; INTRAVENOUS ONCE
Status: CANCELLED | OUTPATIENT
Start: 2019-07-24

## 2019-07-23 RX ORDER — HEPARIN 100 UNIT/ML
300-500 SYRINGE INTRAVENOUS AS NEEDED
Status: CANCELLED
Start: 2019-07-24

## 2019-07-23 RX ORDER — ALBUTEROL SULFATE 0.83 MG/ML
2.5 SOLUTION RESPIRATORY (INHALATION) AS NEEDED
Status: CANCELLED
Start: 2019-07-24

## 2019-07-23 RX ORDER — ONDANSETRON 2 MG/ML
8 INJECTION INTRAMUSCULAR; INTRAVENOUS ONCE
Status: CANCELLED | OUTPATIENT
Start: 2019-07-24

## 2019-07-23 RX ORDER — SODIUM CHLORIDE 9 MG/ML
25 INJECTION, SOLUTION INTRAVENOUS CONTINUOUS
Status: CANCELLED | OUTPATIENT
Start: 2019-07-24

## 2019-07-23 RX ORDER — SODIUM CHLORIDE 9 MG/ML
10 INJECTION INTRAMUSCULAR; INTRAVENOUS; SUBCUTANEOUS AS NEEDED
Status: CANCELLED | OUTPATIENT
Start: 2019-07-24

## 2019-07-23 NOTE — PROGRESS NOTES
[facility] [address]  P [phone]  F [phone]    Office Note  Patient ID:  Name:  Mariano Hansen  MRN:  [id number]  :  1965/53 y.o. Date:  2019      HISTORY OF PRESENT ILLNESS:  Mariano Hansen is a 48 y.o.  perimenopausal female who is being seen for at least sever cervical dysplasia. She is referred by Dr. Bryan Paul. Her most recent pap smear was read as HGSIL and she was high-risk HPV positive. She underwent subsequent colposcopy with biopsy. This revealed ZBIGNIEW 2-3, but an invasive process could not be excluded. On a pelvic ultrasound she was noted to have a complex 4.1 cm right adnexal mass and a 3.7 cm complex mass in the cervix. I have been asked to see her in consultation for further evaluation and management. I recommended an exam under anesthesia with cervical biopsy, cystoscopy, proctoscopy. This was performed on 19. Operative findings:  Replacement of cervix with carcinoma, with extension into proximal anterior vagina.  Parametrial thickening bilaterally.  Uterus still somewhat mobile.  No appreciable disease in the bladder or rectum. FINAL PATHOLOGIC DIAGNOSIS   Cervix, biopsy:   Invasive squamous cell carcinoma   Comment   The case is also seen by Dr. Juliane Mata who concurs with the findings. The results are communicated with Dr. David Davies nurse, Mayelin Helton at approximately 1:30pm on 2019. Following the procedure and confirmation of the diagnosis of cancer, I sent her for a PET/CT. This demonstrated stage IVB disease. I explained that she has metastatic disease and she is not a candidate for surgical resection or curative radiation therapy. I discussed with them that the standard of care would be systemic chemotherapy, consisting of Taxol/Cisplatin/Avastin. If she has a good response, we may reconsider pelvic radiation for local control.   It may also be necessary for control of bleeding, if that becomes a significant issue. She is currently receiving Taxol/Cisplatin/Avastin chemotherapy. She has completed 2 cycles so far. She is tolerating well. Her appetite is good and she has minimal nausea. Her pain is better and she reports that the bleeding has stopped. ROS:   and GI review:  Negative  Cardiopulmonary review:  Negative   Musculoskeletal:  Negative    A comprehensive review of systems was negative except for that written in the History of Present Illness. , 10 point ROS      OB/GYN ROS:  There is no history of significant gyn problems or procedures. 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 ([address] 2019    Hydroureter, right 2019    Chemotherapy induced neutropenia ([address] 2019    Malignant neoplasm of overlapping sites of cervix [facility] 2019    Complex cyst of right ovary 2019     PMH:  Past Medical History:   Diagnosis Date    Cancer ([facility] 2019    CEVICAL    Rheumatoid arthritis ([facility] [address]       PSH:  Past Surgical History:   Procedure Laterality Date    HX  SECTION  1997    HX COLPOSCOPY  2019    HGSIL      Social History:  Social History     Tobacco Use    Smoking status: Former Smoker     Packs/day: 0.50     Years: 2.00     Pack years: 1.00     Last attempt to quit: 1991     Years since quittin.1    Smokeless tobacco: Never Used   Substance Use Topics    Alcohol use: Yes     Comment: OCCASIONALLY      Family History:  Family History   Problem Relation Age of Onset    No Known Problems Mother     Other Father         ACCIDENTAL DEATH    Thyroid Disease Brother     Anesth Problems Neg Hx       Medications: (reviewed)  Current Outpatient Medications   Medication Sig    ondansetron (ZOFRAN ODT) 4 mg disintegrating tablet Take 1 Tab by mouth every six (6) hours as needed for Nausea. Indications: Nausea and Vomiting caused by Cancer Drugs    lidocaine-prilocaine (EMLA) topical cream Apply small amount over port area one hour before chemo treatment and cover with a Band-Aid    dexamethasone (DECADRON) 4 mg tablet Take 2 tablets by mouth with breakfast the day before chemo also take 2 tablets with breakfast for 2 days after chemotherapy    OTHER \"MANY VITAMINS AND SUPPLEMENTS\"    naproxen (NAPROSYN) 500 mg tablet TAKE 1 TABLET BY MOUTH EVERY 8 HOURS FOR 48 HOURS. TAKING MED AS NEEDED    ibuprofen (ADVIL) 200 mg tablet Take 400 mg by mouth every eight (8) hours as needed for Pain. No current facility-administered medications for this visit. Allergies: (reviewed)  No Known Allergies       OBJECTIVE:    Physical Exam:  VITAL SIGNS: Vitals:    07/23/19 1144   BP: 111/69   Pulse: 80   Weight: 178 lb 12.8 oz (81.1 kg)   Height: 5' 2.99\" (1.6 m)     Body mass index is 31.68 kg/m². GENERAL FAUSTINA: Conversant, alert, oriented. No acute distress. HEENT: HEENT. No thyroid enlargement. No JVD. Neck: Supple without restrictions. RESPIRATORY: Clear to auscultation and percussion to the bases. No CVAT. CARDIOVASC: RRR without murmur/rub. GASTROINT: soft, non-tender, without masses or organomegaly   MUSCULOSKEL: no joint tenderness, deformity or swelling   EXTREMITIES: extremities normal, atraumatic, no cyanosis or edema   PELVIC: Deferred   RECTAL: Deferred   KEIRY SURVEY: No suspicious lymphadenopathy or edema noted. NEURO: Grossly intact. No acute deficit.        Lab Data:    Lab Results   Component Value Date/Time    WBC 6.8 07/03/2019 08:40 AM    HGB 11.0 (L) 07/03/2019 08:40 AM    HCT 35.0 07/03/2019 08:40 AM    PLATELET 267 44/87/3241 08:40 AM    MCV 88.2 07/03/2019 08:40 AM     Lab Results   Component Value Date/Time    Sodium 139 07/03/2019 08:40 AM    Potassium 4.1 07/03/2019 08:40 AM    Chloride 109 (H) 07/03/2019 08:40 AM    CO2 26 07/03/2019 08:40 AM    Anion gap 4 (L) 07/03/2019 08:40 AM    Glucose 98 07/03/2019 08:40 AM    BUN 16 07/03/2019 08:40 AM    Creatinine 0.54 (L) 07/03/2019 08:40 AM    BUN/Creatinine ratio 30 (H) 07/03/2019 08:40 AM    GFR est AA >60 07/03/2019 08:40 AM    GFR est non-AA >60 07/03/2019 08:40 AM    Calcium 9.0 07/03/2019 08:40 AM         Imaging: Outside pelvic ultrasound reviewed. Pertinent findings noted in HPI.         PET/CT (5/21/19)  HEAD/NECK: There is 1.8 cm left level 4 lymph node with increased metabolic  activity of 11.     CHEST: No foci of abnormal hypermetabolism. Low-grade activity in a normal right  axillary lymph node is most likely physiologic.     ABDOMEN/PELVIS: There is a 1 cm retrocrural lymph node on the right with  increased metabolic activity of 5.      There is periaortic adenopathy measuring 2 cm with increased metabolic activity  of 9.     There is an enlarged lymph node at the aortic bifurcation with increased  metabolic activity.       There is a complex appearing mass right pelvic sidewall measuring 3.6 cm with  increased metabolic activity of 31.0. There is left iliac adenopathy with increased metabolic activity of 5.7.     There is a 1.4 cm soft tissue nodule intraperitoneal left lower quadrant with  increased metabolic activity of 7.     There is a mass in the cervix with increased metabolic activity of 12  There is right hydronephrosis.     SKELETON: No foci of abnormal hypermetabolism in the axial and visualized  appendicular skeleton.     IMPRESSION:   There is increased metabolic activity in a cervical mass consistent with  known primary neoplasm. There is adenopathy involving right pelvic sidewall, left iliac chain,  para-aortic chain, intra-abdominal nodule left lower quadrant, retrocrural lymph  node and left supra clavicular lymph node with increased metabolic activity  consistent with metastatic disease.  The cervical mass appears to be obstructing  the right ureter resulting in moderate right hydroureteronephrosis. IMPRESSION/PLAN:  Zane Molina is a 48 y.o. 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 We previously discussed the pathology and I reviewed the PET images with her and her . I explained that she has metastatic disease and she is not a candidate for surgical resection or curative radiation therapy. I discussed with them that the standard of care would be systemic chemotherapy, consisting of Taxol/Cisplatin/Avastin. She was counseled on the expected side effects and potential toxicities of the regimen. Her questions were answered and she wished to proceed. If she has a good response, we may reconsider pelvic radiation for local control. It may also be necessary for control of bleeding, if that becomes a significant issue. She is currently receiving Taxol/Cisplatin/Avastin chemotherapy. She has completed 2 cycles. She is tolerating well so far. We will continue with the current regimen and repeat a scan after this cycle.         Signed By: Faby Ramsey MD     7/23/2019/9:50 AM

## 2019-07-24 ENCOUNTER — HOSPITAL ENCOUNTER (OUTPATIENT)
Dept: INFUSION THERAPY | Age: 54
Discharge: HOME | End: 2019-07-24
Payer: COMMERCIAL

## 2019-07-24 VITALS
HEIGHT: 63 IN | SYSTOLIC BLOOD PRESSURE: 121 MMHG | BODY MASS INDEX: 31.43 KG/M2 | DIASTOLIC BLOOD PRESSURE: 78 MMHG | WEIGHT: 177.4 LBS | TEMPERATURE: 97.6 F | RESPIRATION RATE: 18 BRPM | HEART RATE: 78 BPM

## 2019-07-24 DIAGNOSIS — T45.1X5A CHEMOTHERAPY INDUCED NEUTROPENIA (HCC): Primary | ICD-10-CM

## 2019-07-24 DIAGNOSIS — C53.8 MALIGNANT NEOPLASM OF OVERLAPPING SITES OF CERVIX (HCC): ICD-10-CM

## 2019-07-24 DIAGNOSIS — D70.1 CHEMOTHERAPY INDUCED NEUTROPENIA (HCC): Primary | ICD-10-CM

## 2019-07-24 LAB
ALBUMIN SERPL-MCNC: 4.3 G/DL (ref 3.5–5.5)
ALBUMIN/GLOB SERPL: 1.4 {RATIO} (ref 1.2–2.2)
ALP SERPL-CCNC: 94 IU/L (ref 39–117)
ALT SERPL-CCNC: 29 IU/L (ref 0–32)
APPEARANCE UR: CLEAR
AST SERPL-CCNC: 24 IU/L (ref 0–40)
BACTERIA #/AREA URNS HPF: ABNORMAL /[HPF]
BASOPHILS # BLD AUTO: 0 X10E3/UL (ref 0–0.2)
BASOPHILS NFR BLD AUTO: 0 %
BILIRUB SERPL-MCNC: 0.2 MG/DL (ref 0–1.2)
BILIRUB UR QL STRIP: NEGATIVE
BUN SERPL-MCNC: 11 MG/DL (ref 6–24)
BUN/CREAT SERPL: 21 (ref 9–23)
CALCIUM SERPL-MCNC: 9.3 MG/DL (ref 8.7–10.2)
CANCER AG125 SERPL-ACNC: 51 U/ML (ref 0–38.1)
CASTS URNS QL MICRO: ABNORMAL /LPF
CHLORIDE SERPL-SCNC: 100 MMOL/L (ref 96–106)
CO2 SERPL-SCNC: 27 MMOL/L (ref 20–29)
COLOR UR: YELLOW
CREAT SERPL-MCNC: 0.52 MG/DL (ref 0.57–1)
EOSINOPHIL # BLD AUTO: 0.1 X10E3/UL (ref 0–0.4)
EOSINOPHIL NFR BLD AUTO: 1 %
EPI CELLS #/AREA URNS HPF: ABNORMAL /HPF (ref 0–10)
ERYTHROCYTE [DISTWIDTH] IN BLOOD BY AUTOMATED COUNT: 18.4 % (ref 12.3–15.4)
GLOBULIN SER CALC-MCNC: 3 G/DL (ref 1.5–4.5)
GLUCOSE SERPL-MCNC: 65 MG/DL (ref 65–99)
GLUCOSE UR QL: NEGATIVE
HCT VFR BLD AUTO: 36.7 % (ref 34–46.6)
HGB BLD-MCNC: 11.7 G/DL (ref 11.1–15.9)
HGB UR QL STRIP: NEGATIVE
IMM GRANULOCYTES # BLD AUTO: 0 X10E3/UL (ref 0–0.1)
IMM GRANULOCYTES NFR BLD AUTO: 0 %
KETONES UR QL STRIP: NEGATIVE
LEUKOCYTE ESTERASE UR QL STRIP: ABNORMAL
LYMPHOCYTES # BLD AUTO: 1 X10E3/UL (ref 0.7–3.1)
LYMPHOCYTES NFR BLD AUTO: 23 %
MAGNESIUM SERPL-MCNC: 1.9 MG/DL (ref 1.6–2.3)
MCH RBC QN AUTO: 28.2 PG (ref 26.6–33)
MCHC RBC AUTO-ENTMCNC: 31.9 G/DL (ref 31.5–35.7)
MCV RBC AUTO: 88 FL (ref 79–97)
MICRO URNS: ABNORMAL
MONOCYTES # BLD AUTO: 0.5 X10E3/UL (ref 0.1–0.9)
MONOCYTES NFR BLD AUTO: 10 %
MUCOUS THREADS URNS QL MICRO: PRESENT
NEUTROPHILS # BLD AUTO: 2.8 X10E3/UL (ref 1.4–7)
NEUTROPHILS NFR BLD AUTO: 66 %
NITRITE UR QL STRIP: NEGATIVE
PH UR STRIP: 7.5 [PH] (ref 5–7.5)
PLATELET # BLD AUTO: 226 X10E3/UL (ref 150–450)
POTASSIUM SERPL-SCNC: 4.6 MMOL/L (ref 3.5–5.2)
PROT SERPL-MCNC: 7.3 G/DL (ref 6–8.5)
PROT UR QL STRIP: NEGATIVE
RBC # BLD AUTO: 4.15 X10E6/UL (ref 3.77–5.28)
RBC #/AREA URNS HPF: ABNORMAL /HPF (ref 0–2)
SODIUM SERPL-SCNC: 140 MMOL/L (ref 134–144)
SP GR UR: 1.01 (ref 1–1.03)
UROBILINOGEN UR STRIP-MCNC: 0.2 MG/DL (ref 0.2–1)
WBC # BLD AUTO: 4.4 X10E3/UL (ref 3.4–10.8)
WBC #/AREA URNS HPF: ABNORMAL /HPF (ref 0–5)

## 2019-07-24 PROCEDURE — 96415 CHEMO IV INFUSION ADDL HR: CPT

## 2019-07-24 PROCEDURE — 96377 APPLICATON ON-BODY INJECTOR: CPT

## 2019-07-24 PROCEDURE — 74011250636 HC RX REV CODE- 250/636: Performed by: OBSTETRICS & GYNECOLOGY

## 2019-07-24 PROCEDURE — 74011000250 HC RX REV CODE- 250: Performed by: OBSTETRICS & GYNECOLOGY

## 2019-07-24 PROCEDURE — 74011000258 HC RX REV CODE- 258: Performed by: OBSTETRICS & GYNECOLOGY

## 2019-07-24 PROCEDURE — 96367 TX/PROPH/DG ADDL SEQ IV INF: CPT

## 2019-07-24 PROCEDURE — 96417 CHEMO IV INFUS EACH ADDL SEQ: CPT

## 2019-07-24 PROCEDURE — 96413 CHEMO IV INFUSION 1 HR: CPT

## 2019-07-24 PROCEDURE — 74011250637 HC RX REV CODE- 250/637: Performed by: OBSTETRICS & GYNECOLOGY

## 2019-07-24 PROCEDURE — 96375 TX/PRO/DX INJ NEW DRUG ADDON: CPT

## 2019-07-24 RX ORDER — HEPARIN 100 UNIT/ML
300-500 SYRINGE INTRAVENOUS AS NEEDED
Status: ACTIVE | OUTPATIENT
Start: 2019-07-24 | End: 2019-07-24

## 2019-07-24 RX ORDER — SODIUM CHLORIDE 9 MG/ML
25 INJECTION, SOLUTION INTRAVENOUS CONTINUOUS
Status: DISPENSED | OUTPATIENT
Start: 2019-07-24 | End: 2019-07-24

## 2019-07-24 RX ORDER — SODIUM CHLORIDE 0.9 % (FLUSH) 0.9 %
10 SYRINGE (ML) INJECTION AS NEEDED
Status: ACTIVE | OUTPATIENT
Start: 2019-07-24 | End: 2019-07-24

## 2019-07-24 RX ORDER — SODIUM CHLORIDE 9 MG/ML
10 INJECTION INTRAMUSCULAR; INTRAVENOUS; SUBCUTANEOUS AS NEEDED
Status: ACTIVE | OUTPATIENT
Start: 2019-07-24 | End: 2019-07-24

## 2019-07-24 RX ORDER — ONDANSETRON 2 MG/ML
8 INJECTION INTRAMUSCULAR; INTRAVENOUS ONCE
Status: COMPLETED | OUTPATIENT
Start: 2019-07-24 | End: 2019-07-24

## 2019-07-24 RX ORDER — DIPHENHYDRAMINE HYDROCHLORIDE 50 MG/ML
50 INJECTION, SOLUTION INTRAMUSCULAR; INTRAVENOUS ONCE
Status: DISCONTINUED | OUTPATIENT
Start: 2019-07-24 | End: 2019-07-24

## 2019-07-24 RX ORDER — DIPHENHYDRAMINE HCL 25 MG
50 CAPSULE ORAL ONCE
Status: COMPLETED | OUTPATIENT
Start: 2019-07-24 | End: 2019-07-24

## 2019-07-24 RX ADMIN — ONDANSETRON 8 MG: 2 INJECTION, SOLUTION INTRAMUSCULAR; INTRAVENOUS at 11:59

## 2019-07-24 RX ADMIN — SODIUM CHLORIDE 20 MG: 9 INJECTION INTRAMUSCULAR; INTRAVENOUS; SUBCUTANEOUS at 12:01

## 2019-07-24 RX ADMIN — PEGFILGRASTIM 6 MG: KIT SUBCUTANEOUS at 18:20

## 2019-07-24 RX ADMIN — DEXAMETHASONE SODIUM PHOSPHATE 12 MG: 4 INJECTION, SOLUTION INTRAMUSCULAR; INTRAVENOUS at 12:05

## 2019-07-24 RX ADMIN — POTASSIUM CHLORIDE: 2 INJECTION, SOLUTION, CONCENTRATE INTRAVENOUS at 13:32

## 2019-07-24 RX ADMIN — DIPHENHYDRAMINE HYDROCHLORIDE 50 MG: 25 CAPSULE ORAL at 11:59

## 2019-07-24 RX ADMIN — PACLITAXEL 329 MG: 6 INJECTION, SOLUTION INTRAVENOUS at 13:01

## 2019-07-24 RX ADMIN — SODIUM CHLORIDE 25 ML/HR: 900 INJECTION, SOLUTION INTRAVENOUS at 11:24

## 2019-07-24 RX ADMIN — BEVACIZUMAB 1210 MG: 400 INJECTION, SOLUTION INTRAVENOUS at 11:24

## 2019-07-24 RX ADMIN — SODIUM CHLORIDE 150 MG: 900 INJECTION, SOLUTION INTRAVENOUS at 12:20

## 2019-07-24 RX ADMIN — CISPLATIN 94 MG: 1 INJECTION, SOLUTION INTRAVENOUS at 16:00

## 2019-07-24 RX ADMIN — POTASSIUM CHLORIDE: 2 INJECTION, SOLUTION, CONCENTRATE INTRAVENOUS at 09:48

## 2019-07-24 NOTE — PROGRESS NOTES
0845 Pt admit to [facility] for C3 Avastin/Taxol/Cisplatin ambulatory in stable condition. Assessment completed. No new concerns voiced. Port accessed and flsuhed with positive blood return. [facility]. Labs reviewed and medication ordered.     Visit Vitals  /78   Pulse 78   Temp 97.6 °F (36.4 °C)   Resp 18   Ht 5' 3\" (1.6 m)   Wt 80.5 kg (177 lb 6.4 oz)   BMI 31.42 kg/m²       Medications:    Medications Administered     0.9% sodium chloride 1,000 mL with potassium chloride 10 mEq, magnesium sulfate 2 g infusion     Admin Date  07/24/2019 Action  Given Dose   Rate  1,000 mL/hr Route  IntraVENous Administered By  Richard Magdaleno RN           Admin Date  07/24/2019 Action  Given Dose   Rate  1,000 mL/hr Route  IntraVENous Administered By  Richard Magdaleno RN          0.9% sodium chloride infusion     Admin Date  07/24/2019 Action  New Bag Dose  25 mL/hr Rate  25 mL/hr Route  IntraVENous Administered By  Shannan Crabtree RN          bevacizumab (AVASTIN) 1,210 mg in 0.9% sodium chloride 100 mL, overfill volume 10 mL IVPB     Admin Date  07/24/2019 Action  New Bag Dose  1210 mg Rate  316.8 mL/hr Route  IntraVENous Administered By  Shannan Crabtree RN          CISplatin (PLATINOL) 94 mg in 0.9% sodium chloride 500 mL, overfill volume 50 mL chemo infusion     Admin Date  07/24/2019 Action  New Bag Dose  94 mg Rate  322 mL/hr Route  IntraVENous Administered By  Richard Magdaleno RN          dexamethasone (DECADRON) 12 mg in 0.9% sodium chloride 50 mL, overfill volume 5 mL IVPB     Admin Date  07/24/2019 Action  Given Dose  12 mg Rate  232 mL/hr Route  IntraVENous Administered By  Richard Magdaleno RN          diphenhydrAMINE (BENADRYL) capsule 50 mg     Admin Date  07/24/2019 Action  Given Dose  50 mg Route  Oral Administered By  Richard Magdaleno RN          famotidine (PF) (PEPCID) 20 mg in sodium chloride 0.9% 10 mL injection     Admin Date  07/24/2019 Action  Given Dose  20 mg Route  IntraVENous Administered By  Pepe Block RN          fosaprepitant (EMEND) 150 mg in 0.9% sodium chloride 150 mL IVPB     Admin Date  07/24/2019 Action  Given Dose  150 mg Rate  450 mL/hr Route  IntraVENous Administered By  Pepe Block RN          [facility]ron [facility] injection 8 mg     Admin Date  07/24/2019 Action  Given Dose  8 mg Route  IntraVENous Administered By  Pepe Block RN          PACLitaxel (TAXOL) 329 mg in 0.9% sodium chloride 250 mL, overfill volume 25 mL chemo infusion     Admin Date  07/24/2019 Action  New Bag Dose  329 mg Rate  109.9 mL/hr Route  IntraVENous Administered By  Pepe Block RN          pegfilgrastim (NEULASTA) wearable SQ injector 6 mg     Admin Date  07/24/2019 Action  Given Dose  6 mg Route  SubCUTAneous Administered By  Pepe Block, RN                  0561 Pt tolerated treatment well. Port maintained positive blood return throughout treatment, flushed with positive blood return at conclusion and port heparinized and de-accessed per protocol. D/c home ambulatory in no distress. Pt aware of next [facility] appointment scheduled for 8/14/19.

## 2019-07-25 PROCEDURE — 77030012965 HC NDL HUBR BBMI -A

## 2019-07-30 ENCOUNTER — TELEPHONE (OUTPATIENT)
Dept: GYNECOLOGY | Age: 54
End: 2019-07-30

## 2019-07-30 DIAGNOSIS — R52 PAIN: Primary | ICD-10-CM

## 2019-07-30 NOTE — TELEPHONE ENCOUNTER
I discussed the patients symptoms with  and advised the patient to monitor until her ct scan unless pain becomes worse then she will need to be seen. We will also fax an order to [employer] in [place] for urinalysis. The pt is in agreement with this plan.

## 2019-07-30 NOTE — TELEPHONE ENCOUNTER
Pt calling to state she had to take Zofran ODT after chemo with relief but now experiencing for the last 2 days \"pee dark and reddish, but I have been drinking beet and carrot juice and I have right side kidney pain, my pain is a 4 on pain scale\". I tried to schedule an appt with Dr. Arik Luu for tomorrow and she ask that we talk with him first.  I will refer call to Reyna Verduzco LPN.

## 2019-08-02 LAB
APPEARANCE UR: CLEAR
BACTERIA #/AREA URNS HPF: NORMAL /[HPF]
BACTERIA UR CULT: NO GROWTH
BILIRUB UR QL STRIP: NEGATIVE
CASTS URNS QL MICRO: NORMAL /LPF
COLOR UR: YELLOW
EPI CELLS #/AREA URNS HPF: NORMAL /HPF (ref 0–10)
GLUCOSE UR QL: NEGATIVE
HGB UR QL STRIP: NEGATIVE
KETONES UR QL STRIP: NEGATIVE
LEUKOCYTE ESTERASE UR QL STRIP: ABNORMAL
MICRO URNS: ABNORMAL
NITRITE UR QL STRIP: NEGATIVE
PH UR STRIP: 8.5 [PH] (ref 5–7.5)
PROT UR QL STRIP: NEGATIVE
RBC #/AREA URNS HPF: NORMAL /HPF (ref 0–2)
SP GR UR: 1.01 (ref 1–1.03)
UROBILINOGEN UR STRIP-MCNC: 0.2 MG/DL (ref 0.2–1)
WBC #/AREA URNS HPF: NORMAL /HPF (ref 0–5)

## 2019-08-02 RX ORDER — HYDROCORTISONE SODIUM SUCCINATE 100 MG/2ML
100 INJECTION, POWDER, FOR SOLUTION INTRAMUSCULAR; INTRAVENOUS AS NEEDED
Status: CANCELLED | OUTPATIENT
Start: 2019-08-14

## 2019-08-02 RX ORDER — EPINEPHRINE 1 MG/ML
0.3 INJECTION, SOLUTION, CONCENTRATE INTRAVENOUS AS NEEDED
Status: CANCELLED | OUTPATIENT
Start: 2019-08-14

## 2019-08-02 RX ORDER — HEPARIN 100 UNIT/ML
300-500 SYRINGE INTRAVENOUS AS NEEDED
Status: CANCELLED
Start: 2019-08-14

## 2019-08-02 RX ORDER — DIPHENHYDRAMINE HYDROCHLORIDE 50 MG/ML
50 INJECTION, SOLUTION INTRAMUSCULAR; INTRAVENOUS AS NEEDED
Status: CANCELLED
Start: 2019-08-14

## 2019-08-02 RX ORDER — SODIUM CHLORIDE 9 MG/ML
10 INJECTION INTRAMUSCULAR; INTRAVENOUS; SUBCUTANEOUS AS NEEDED
Status: CANCELLED | OUTPATIENT
Start: 2019-08-14

## 2019-08-02 RX ORDER — ONDANSETRON 2 MG/ML
8 INJECTION INTRAMUSCULAR; INTRAVENOUS ONCE
Status: CANCELLED | OUTPATIENT
Start: 2019-08-14

## 2019-08-02 RX ORDER — SODIUM CHLORIDE 0.9 % (FLUSH) 0.9 %
10 SYRINGE (ML) INJECTION AS NEEDED
Status: CANCELLED
Start: 2019-08-14

## 2019-08-02 RX ORDER — ONDANSETRON 2 MG/ML
8 INJECTION INTRAMUSCULAR; INTRAVENOUS AS NEEDED
Status: CANCELLED | OUTPATIENT
Start: 2019-08-14

## 2019-08-02 RX ORDER — ACETAMINOPHEN 325 MG/1
650 TABLET ORAL AS NEEDED
Status: CANCELLED
Start: 2019-08-14

## 2019-08-02 RX ORDER — ALBUTEROL SULFATE 0.83 MG/ML
2.5 SOLUTION RESPIRATORY (INHALATION) AS NEEDED
Status: CANCELLED
Start: 2019-08-14

## 2019-08-02 RX ORDER — SODIUM CHLORIDE 9 MG/ML
25 INJECTION, SOLUTION INTRAVENOUS CONTINUOUS
Status: CANCELLED | OUTPATIENT
Start: 2019-08-14

## 2019-08-02 RX ORDER — DIPHENHYDRAMINE HYDROCHLORIDE 50 MG/ML
50 INJECTION, SOLUTION INTRAMUSCULAR; INTRAVENOUS ONCE
Status: CANCELLED | OUTPATIENT
Start: 2019-08-14

## 2019-08-12 ENCOUNTER — HOSPITAL ENCOUNTER (OUTPATIENT)
Dept: CT IMAGING | Age: 54
Discharge: HOME | End: 2019-08-12
Attending: OBSTETRICS & GYNECOLOGY
Payer: COMMERCIAL

## 2019-08-12 DIAGNOSIS — C53.8 MALIGNANT NEOPLASM OF OVERLAPPING SITES OF CERVIX (HCC): Primary | ICD-10-CM

## 2019-08-12 DIAGNOSIS — C53.8 MALIGNANT NEOPLASM OF OVERLAPPING SITES OF CERVIX (HCC): ICD-10-CM

## 2019-08-12 PROCEDURE — 71260 CT THORAX DX C+: CPT

## 2019-08-12 PROCEDURE — 74011000258 HC RX REV CODE- 258: Performed by: RADIOLOGY

## 2019-08-12 PROCEDURE — 74011636320 HC RX REV CODE- 636/320: Performed by: RADIOLOGY

## 2019-08-12 PROCEDURE — 74177 CT ABD & PELVIS W/CONTRAST: CPT

## 2019-08-12 RX ORDER — SODIUM CHLORIDE 0.9 % (FLUSH) 0.9 %
10 SYRINGE (ML) INJECTION
Status: COMPLETED | OUTPATIENT
Start: 2019-08-12 | End: 2019-08-12

## 2019-08-12 RX ADMIN — Medication 10 ML: at 11:22

## 2019-08-12 RX ADMIN — IOHEXOL 50 ML: 240 INJECTION, SOLUTION INTRATHECAL; INTRAVASCULAR; INTRAVENOUS; ORAL at 11:22

## 2019-08-12 RX ADMIN — IOPAMIDOL 100 ML: 755 INJECTION, SOLUTION INTRAVENOUS at 11:22

## 2019-08-12 RX ADMIN — SODIUM CHLORIDE 100 ML: 900 INJECTION, SOLUTION INTRAVENOUS at 11:22

## 2019-08-13 ENCOUNTER — OFFICE VISIT (OUTPATIENT)
Dept: GYNECOLOGY | Age: 54
End: 2019-08-13

## 2019-08-13 VITALS
WEIGHT: 176.2 LBS | BODY MASS INDEX: 31.22 KG/M2 | SYSTOLIC BLOOD PRESSURE: 105 MMHG | DIASTOLIC BLOOD PRESSURE: 79 MMHG | HEIGHT: 63 IN | HEART RATE: 82 BPM

## 2019-08-13 DIAGNOSIS — C53.8 MALIGNANT NEOPLASM OF OVERLAPPING SITES OF CERVIX (HCC): Primary | ICD-10-CM

## 2019-08-13 LAB
ALBUMIN SERPL-MCNC: 4.2 G/DL (ref 3.5–5.5)
ALBUMIN/GLOB SERPL: 1.4 {RATIO} (ref 1.2–2.2)
ALP SERPL-CCNC: 83 IU/L (ref 39–117)
ALT SERPL-CCNC: 23 IU/L (ref 0–32)
AST SERPL-CCNC: 22 IU/L (ref 0–40)
BASOPHILS # BLD AUTO: 0 X10E3/UL (ref 0–0.2)
BASOPHILS NFR BLD AUTO: 0 %
BILIRUB SERPL-MCNC: ABNORMAL MG/DL (ref 0–1.2)
BUN SERPL-MCNC: 8 MG/DL (ref 6–24)
BUN/CREAT SERPL: 17 (ref 9–23)
CALCIUM SERPL-MCNC: 9.1 MG/DL (ref 8.7–10.2)
CHLORIDE SERPL-SCNC: 104 MMOL/L (ref 96–106)
CO2 SERPL-SCNC: 25 MMOL/L (ref 20–29)
CREAT SERPL-MCNC: 0.48 MG/DL (ref 0.57–1)
EOSINOPHIL # BLD AUTO: 0 X10E3/UL (ref 0–0.4)
EOSINOPHIL NFR BLD AUTO: 1 %
ERYTHROCYTE [DISTWIDTH] IN BLOOD BY AUTOMATED COUNT: 18.3 % (ref 12.3–15.4)
GLOBULIN SER CALC-MCNC: 2.9 G/DL (ref 1.5–4.5)
GLUCOSE SERPL-MCNC: 71 MG/DL (ref 65–99)
HCT VFR BLD AUTO: 35.2 % (ref 34–46.6)
HGB BLD-MCNC: 12.1 G/DL (ref 11.1–15.9)
LYMPHOCYTES # BLD AUTO: 0.8 X10E3/UL (ref 0.7–3.1)
LYMPHOCYTES NFR BLD AUTO: 22 %
MAGNESIUM SERPL-MCNC: 2 MG/DL (ref 1.6–2.3)
MCH RBC QN AUTO: 29 PG (ref 26.6–33)
MCHC RBC AUTO-ENTMCNC: 34.4 G/DL (ref 31.5–35.7)
MCV RBC AUTO: 84 FL (ref 79–97)
MONOCYTES # BLD AUTO: 0.4 X10E3/UL (ref 0.1–0.9)
MONOCYTES NFR BLD AUTO: 11 %
NEUTROPHILS # BLD AUTO: 2.5 X10E3/UL (ref 1.4–7)
NEUTROPHILS NFR BLD AUTO: 66 %
PLATELET # BLD AUTO: 238 X10E3/UL (ref 150–450)
POTASSIUM SERPL-SCNC: 5.1 MMOL/L (ref 3.5–5.2)
PROT SERPL-MCNC: 7.1 G/DL (ref 6–8.5)
RBC # BLD AUTO: 4.17 X10E6/UL (ref 3.77–5.28)
SODIUM SERPL-SCNC: 141 MMOL/L (ref 134–144)
WBC # BLD AUTO: 3.8 X10E3/UL (ref 3.4–10.8)

## 2019-08-14 ENCOUNTER — HOSPITAL ENCOUNTER (OUTPATIENT)
Dept: INFUSION THERAPY | Age: 54
Discharge: HOME | End: 2019-08-14
Payer: COMMERCIAL

## 2019-08-14 VITALS
HEART RATE: 73 BPM | WEIGHT: 175.4 LBS | HEIGHT: 63 IN | RESPIRATION RATE: 18 BRPM | SYSTOLIC BLOOD PRESSURE: 133 MMHG | BODY MASS INDEX: 31.08 KG/M2 | DIASTOLIC BLOOD PRESSURE: 84 MMHG | TEMPERATURE: 97.6 F

## 2019-08-14 DIAGNOSIS — T45.1X5A CHEMOTHERAPY INDUCED NEUTROPENIA (HCC): Primary | ICD-10-CM

## 2019-08-14 DIAGNOSIS — D70.1 CHEMOTHERAPY INDUCED NEUTROPENIA (HCC): Primary | ICD-10-CM

## 2019-08-14 DIAGNOSIS — C53.8 MALIGNANT NEOPLASM OF OVERLAPPING SITES OF CERVIX (HCC): ICD-10-CM

## 2019-08-14 LAB
APPEARANCE UR: CLEAR
BACTERIA URNS QL MICRO: NEGATIVE /HPF
BILIRUB UR QL: NEGATIVE
COLOR UR: ABNORMAL
EPITH CASTS URNS QL MICRO: ABNORMAL /LPF
GLUCOSE UR STRIP.AUTO-MCNC: NEGATIVE MG/DL
HGB UR QL STRIP: NEGATIVE
KETONES UR QL STRIP.AUTO: NEGATIVE MG/DL
LEUKOCYTE ESTERASE UR QL STRIP.AUTO: ABNORMAL
NITRITE UR QL STRIP.AUTO: NEGATIVE
PH UR STRIP: 7 [PH] (ref 5–8)
PROT UR STRIP-MCNC: NEGATIVE MG/DL
RBC #/AREA URNS HPF: ABNORMAL /HPF (ref 0–5)
SP GR UR REFRACTOMETRY: 1.01 (ref 1–1.03)
UROBILINOGEN UR QL STRIP.AUTO: 0.2 EU/DL (ref 0.2–1)
WBC URNS QL MICRO: ABNORMAL /HPF (ref 0–4)

## 2019-08-14 PROCEDURE — 96417 CHEMO IV INFUS EACH ADDL SEQ: CPT

## 2019-08-14 PROCEDURE — 96413 CHEMO IV INFUSION 1 HR: CPT

## 2019-08-14 PROCEDURE — 96368 THER/DIAG CONCURRENT INF: CPT

## 2019-08-14 PROCEDURE — 74011000250 HC RX REV CODE- 250: Performed by: OBSTETRICS & GYNECOLOGY

## 2019-08-14 PROCEDURE — 96415 CHEMO IV INFUSION ADDL HR: CPT

## 2019-08-14 PROCEDURE — 96375 TX/PRO/DX INJ NEW DRUG ADDON: CPT

## 2019-08-14 PROCEDURE — 74011250636 HC RX REV CODE- 250/636: Performed by: OBSTETRICS & GYNECOLOGY

## 2019-08-14 PROCEDURE — 74011250637 HC RX REV CODE- 250/637: Performed by: OBSTETRICS & GYNECOLOGY

## 2019-08-14 PROCEDURE — 96361 HYDRATE IV INFUSION ADD-ON: CPT

## 2019-08-14 PROCEDURE — 96367 TX/PROPH/DG ADDL SEQ IV INF: CPT

## 2019-08-14 PROCEDURE — 81001 URINALYSIS AUTO W/SCOPE: CPT

## 2019-08-14 PROCEDURE — 96377 APPLICATON ON-BODY INJECTOR: CPT

## 2019-08-14 PROCEDURE — 74011250636 HC RX REV CODE- 250/636

## 2019-08-14 PROCEDURE — 74011000258 HC RX REV CODE- 258: Performed by: OBSTETRICS & GYNECOLOGY

## 2019-08-14 PROCEDURE — 77030012965 HC NDL HUBR BBMI -A

## 2019-08-14 RX ORDER — SODIUM CHLORIDE 0.9 % (FLUSH) 0.9 %
10 SYRINGE (ML) INJECTION AS NEEDED
Status: ACTIVE | OUTPATIENT
Start: 2019-08-14 | End: 2019-08-14

## 2019-08-14 RX ORDER — SODIUM CHLORIDE 9 MG/ML
25 INJECTION, SOLUTION INTRAVENOUS CONTINUOUS
Status: DISPENSED | OUTPATIENT
Start: 2019-08-14 | End: 2019-08-14

## 2019-08-14 RX ORDER — SODIUM CHLORIDE 9 MG/ML
10 INJECTION INTRAMUSCULAR; INTRAVENOUS; SUBCUTANEOUS AS NEEDED
Status: ACTIVE | OUTPATIENT
Start: 2019-08-14 | End: 2019-08-14

## 2019-08-14 RX ORDER — HEPARIN 100 UNIT/ML
300-500 SYRINGE INTRAVENOUS AS NEEDED
Status: ACTIVE | OUTPATIENT
Start: 2019-08-14 | End: 2019-08-14

## 2019-08-14 RX ORDER — DIPHENHYDRAMINE HCL 25 MG
50 CAPSULE ORAL
Status: COMPLETED | OUTPATIENT
Start: 2019-08-14 | End: 2019-08-14

## 2019-08-14 RX ORDER — DIPHENHYDRAMINE HYDROCHLORIDE 50 MG/ML
50 INJECTION, SOLUTION INTRAMUSCULAR; INTRAVENOUS ONCE
Status: DISCONTINUED | OUTPATIENT
Start: 2019-08-14 | End: 2019-08-14 | Stop reason: ALTCHOICE

## 2019-08-14 RX ORDER — ONDANSETRON 2 MG/ML
8 INJECTION INTRAMUSCULAR; INTRAVENOUS ONCE
Status: COMPLETED | OUTPATIENT
Start: 2019-08-14 | End: 2019-08-14

## 2019-08-14 RX ADMIN — DIPHENHYDRAMINE HYDROCHLORIDE 50 MG: 25 CAPSULE ORAL at 11:49

## 2019-08-14 RX ADMIN — SODIUM CHLORIDE 20 MG: 9 INJECTION INTRAMUSCULAR; INTRAVENOUS; SUBCUTANEOUS at 11:51

## 2019-08-14 RX ADMIN — Medication 500 UNITS: at 18:19

## 2019-08-14 RX ADMIN — SODIUM CHLORIDE 150 MG: 900 INJECTION, SOLUTION INTRAVENOUS at 12:13

## 2019-08-14 RX ADMIN — PACLITAXEL 329 MG: 6 INJECTION, SOLUTION INTRAVENOUS at 12:42

## 2019-08-14 RX ADMIN — PEGFILGRASTIM 6 MG: KIT SUBCUTANEOUS at 18:14

## 2019-08-14 RX ADMIN — POTASSIUM CHLORIDE: 2 INJECTION, SOLUTION, CONCENTRATE INTRAVENOUS at 09:48

## 2019-08-14 RX ADMIN — Medication 10 ML: at 18:19

## 2019-08-14 RX ADMIN — ONDANSETRON 8 MG: 2 INJECTION, SOLUTION INTRAMUSCULAR; INTRAVENOUS at 11:50

## 2019-08-14 RX ADMIN — POTASSIUM CHLORIDE: 2 INJECTION, SOLUTION, CONCENTRATE INTRAVENOUS at 12:41

## 2019-08-14 RX ADMIN — DEXAMETHASONE SODIUM PHOSPHATE 12 MG: 4 INJECTION, SOLUTION INTRAMUSCULAR; INTRAVENOUS at 11:54

## 2019-08-14 RX ADMIN — CISPLATIN 94 MG: 100 INJECTION, SOLUTION INTRAVENOUS at 16:03

## 2019-08-14 RX ADMIN — SODIUM CHLORIDE 25 ML/HR: 900 INJECTION, SOLUTION INTRAVENOUS at 09:52

## 2019-08-14 RX ADMIN — BEVACIZUMAB 1210 MG: 400 INJECTION, SOLUTION INTRAVENOUS at 10:52

## 2019-08-14 NOTE — PROGRESS NOTES
27 [facility] [address], [address]  P [phone]  F [phone]      8/14/2019   Bobo Mccrary MD: Dione Kaur MD  PCP: Lalo Mcguire MD     Primary Onc Dx: cervical cancer, stage IVB  Date of Dx: May 2019        HPI:  48 y.o. Sybil with a new dx of advanced cervical cancer s/p workup for cervical dysplasia including colpo and EUA on 5/22/19. Biopsies there showed an invasive SCC. Subsequent PET scan revealed metastatic disease. She is recommended protocol with paclitaxel/cis/bevacizumab. OncTx History:  5/11/19 Colpo ZBIGNIEW 2-3, cannot r/o invasive component   5/22/19 Exam under anesthesia, cystoscopy, proctoscopy, cervical biopsy   Cervix, biopsy:  Invasive squamous cell carcinoma    5/21/19 PET/CT  HEAD/NECK: There is 1.8 cm left level 4 lymph node with increased metabolic activity of 11.     CHEST: No foci of abnormal hypermetabolism. Low-grade activity in a normal right  axillary lymph node is most likely physiologic.     ABDOMEN/PELVIS: There is a 1 cm retrocrural lymph node on the right with  increased metabolic activity of 5. There is periaortic adenopathy measuring 2 cm with increased metabolic activity  of 9. There is an enlarged lymph node at the aortic bifurcation with increased  metabolic activity. There is a complex appearing mass right pelvic sidewall measuring 3.6 cm with  increased metabolic activity of 76.7. There is left iliac adenopathy with increased metabolic activity of 5.7. There is a 1.4 cm soft tissue nodule intraperitoneal left lower quadrant with  increased metabolic activity of 7. There is a mass in the cervix with increased metabolic activity of 12  There is right hydronephrosis.     SKELETON: No foci of abnormal hypermetabolism in the axial and visualized appendicular skeleton.     IMPRESSION:   1. There is increased metabolic activity in a cervical mass consistent with known primary neoplasm.    There is adenopathy involving right pelvic sidewall, left iliac chain, para-aortic chain, intra-abdominal nodule left lower quadrant, retrocrural lymph node and left supra clavicular lymph node with increased metabolic activity  consistent with metastatic disease. The cervical mass appears to be obstructing the right ureter resulting in moderate right hydroureteronephrosis. 6/12/19 Initiated Taxol/Cis/Avastin   8/12/19 CT CAP:   No evidence for metastatic disease or other acute abnormality in the chest, abdomen, or pelvis. Cycle: 4     SUBJECTIVE:  Yarelis Villar presents for chemotherapy. She has been doing well with her regimen. She is elated about recent CT findings. A couple days of nausea/fatigue managed with antiemetics. No emesis. Notes right low back pain and joint pain following chemo. Bowels/bladder normal. Using miralax. Notes hoarseness and dysphagia following chemo, was Rx'd swish/swallow following last visit with Dr. Rita Orozco. Right index finger tip swollen last couple of days, improving. Denies F/C. Mobility improving. No report of trauma/bug bit. She is active and working full time in . She is eating a diet high in beets. She performs all ADLs, lives with . She has no acute needs. Not using opioid medication. ROS  Constitutional: no weight loss, fever, night sweats  Respiratory: no cough, shortness of breath, or wheezing  Cardiovascular: no chest pain or dyspnea on exertion  Heme: No abnormal bleeding  Gastrointestinal: +dysphagia.  No abdominal pain, change in bowel habits, or black or bloody stools  Genito-Urinary: no dysuria, trouble voiding, or hematuria (though red-tinged urine with beet diet)  Musculoskeletal: back/joint pain 5 days following chemo  Neurological: negative for - gait disturbance, headaches, memory loss or numbness/tingling  Derm: negative  Psych: negative for depression       OBJECTIVE:  Physical Exam  Visit Vitals  /77 (BP 1 Location: Left arm, BP Patient Position: At rest)   Pulse 78   Temp 97.6 °F (36.4 °C)   Resp 18   Ht 5' 3\" (1.6 m)   Wt 175 lb 6.4 oz (79.6 kg)   BMI 31.07 kg/m²        General:  alert, cooperative, no distress       HEENT: without pallor, sclera without jaundice, oral mucosa without lesions,      Cardiac:  Regular rate and rhythm        Lungs:  clear to auscultation bilaterally          Port:  clean, dry, no drainage  Abdomen:  soft, non-tender, without masses or organomegaly       Lymph:  no lymphadenopathy   Extremity: extremities normal, atraumatic, no cyanosis or edema   Right index finger tip minimally engorged, mild rubor compared to rest. Normothermic, nontender, flexion mildly limited compared to other digits. No recognized ingrown nail or foreign body. Wt Readings from Last 3 Encounters:   08/13/19 176 lb 3.2 oz (79.9 kg)   07/24/19 177 lb 6.4 oz (80.5 kg)   07/23/19 178 lb 12.8 oz (81.1 kg)       Lab Results   Component Value Date/Time    WBC 3.8 08/13/2019 11:12 AM    ABS. NEUTROPHILS 2.5 08/13/2019 11:12 AM    HGB 12.1 08/13/2019 11:12 AM    HCT 35.2 08/13/2019 11:12 AM    MCV 84 08/13/2019 11:12 AM    MCH 29.0 08/13/2019 11:12 AM    PLATELET 125 33/00/0747 11:12 AM     Lab Results   Component Value Date/Time    Sodium 141 08/13/2019 11:12 AM    Potassium 5.1 08/13/2019 11:12 AM    Chloride 104 08/13/2019 11:12 AM    CO2 25 08/13/2019 11:12 AM    Glucose 71 08/13/2019 11:12 AM    BUN 8 08/13/2019 11:12 AM    Creatinine 0.48 (L) 08/13/2019 11:12 AM    Calcium 9.1 08/13/2019 11:12 AM    Albumin 4.2 08/13/2019 11:12 AM    Bilirubin, total Note: 08/13/2019 11:12 AM    AST (SGOT) 22 08/13/2019 11:12 AM    ALT (SGPT) 23 08/13/2019 11:12 AM    Alk. phosphatase 83 08/13/2019 11:12 AM     No results found for: HBA1C, HGBE8, KOR7IDIW, RQH5MEJD, AWD9GYJZ  UA no blood, bacteria/nitrites, no protein.      Tumor markers  Lab Results   Component Value Date/Time    CA-125 54 (H) 07/03/2019 08:40 AM    Cancer Ag (CA) 125 51.0 (H) 07/23/2019 12:59 PM     Cancer Ag (CA) 125   Date Value Ref Range Status   07/23/2019 51.0 (H) 0.0 - 38.1 U/mL Final     Comment:     [employer] Electrochemiluminescence Immunoassay (ECLIA)  Values obtained with different assay methods or kits cannot be  used interchangeably. Results cannot be interpreted as absolute  evidence of the presence or absence of malignant disease. 07/02/2019 63.4 (H) 0.0 - 38.1 U/mL Final     Comment:     [employer] Electrochemiluminescence Immunoassay (ECLIA)  Values obtained with different assay methods or kits cannot be  used interchangeably. Results cannot be interpreted as absolute  evidence of the presence or absence of malignant disease. Patient Active Problem List   Diagnosis Code    Complex cyst of right ovary N83.291    Malignant neoplasm of overlapping sites of cervix ([facility] [address] C53.8    Chemotherapy induced neutropenia (HCC) D70.1, T45.1X5A    On antineoplastic chemotherapy Z79.899    Metastatic squamous cell carcinoma to lymph node (HCC) C77.9    Hydroureter, right N13.4     Past Medical History:   Diagnosis Date    Cancer ([facility] 2019    CEVICAL    Rheumatoid arthritis ([facility] [facility]      Prior to Admission medications    Medication Sig Start Date End Date Taking? Authorizing Provider   ondansetron (ZOFRAN ODT) 4 mg disintegrating tablet Take 1 Tab by mouth every six (6) hours as needed for Nausea.  Indications: Nausea and Vomiting caused by Cancer Drugs 6/7/19   Danilo Zuniga MD   lidocaine-prilocaine (EMLA) topical cream Apply small amount over port area one hour before chemo treatment and cover with a Band-Aid 6/7/19   Danilo Zuniga MD   dexamethasone (DECADRON) 4 mg tablet Take 2 tablets by mouth with breakfast the day before chemo also take 2 tablets with breakfast for 2 days after chemotherapy 6/7/19   Danilo Zuniga MD   OTHER [facility] VITAMINS AND SUPPLEMENTS\"    Provider, Historical   naproxen (NAPROSYN) 500 mg tablet TAKE 1 TABLET BY MOUTH EVERY 8 HOURS FOR 48 HOURS. TAKING MED AS NEEDED 19   Provider, Historical   ibuprofen (ADVIL) 200 mg tablet Take 400 mg by mouth every eight (8) hours as needed for Pain. Provider, Historical     No Known Allergies  Family History   Problem Relation Age of Onset    No Known Problems Mother     Other Father         ACCIDENTAL DEATH    Thyroid Disease Brother     Anesth Problems Neg Hx        IMPRESSION/PLAN:  48 y.o. with a stage IVB SCC of the cervix dx in May 2019. Asx currently. ECO    Patient Active Problem List   Diagnosis Code    Complex cyst of right ovary N83.291    Malignant neoplasm of overlapping sites of cervix ([facility] [address] C53.8    Chemotherapy induced neutropenia (HCC) D70.1, T45.1X5A    On antineoplastic chemotherapy Z79.899    Metastatic squamous cell carcinoma to lymph node (HCC) C77.9    Hydroureter, right N13.4         Chemotherapy Taxol/Cis/Avastin - response to therapy noted on imaging, overall improvement, noted measurable pelvic malgorzata disease. Continue chemo x 3 cycles and adjuvant surgery/radiation TBD on f/u with Dr. Thomas. Moderate right hydroureternephrosis, improved on CT. Cycstoscopy negative for tumor. The right sided pain may be more musculoskeletal associated with Taxol than a relation to the hydro. Myelosuppression: G1 anemia - resolved  CINV: controlled antiemetics. Other toxicity: none thus far  Chronic med issues: RA controlled, no swelling   Right index finger acute edema, possible unrecognized trauma/insect. Resolving per patient. If any sign of progression, advised to see her PCP or UC. Psychosocial: well supported, discussed ancillary services, [facility], supportive care at home with diet, interventions and ppx. 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 Infection precautions advised. Questions addressed. Advised to call with any concerns.       JEIMY Mancilla  Gyn Onc

## 2019-08-14 NOTE — PROGRESS NOTES
[facility] Progress Note    Date: 2019    Name: Mo Norris    MRN: [id number]         : 1965    Ms. Dela Cruz Arrived 0850 and in no distress for cycle Day 1 Cycle 4 Cisplatin, paclitaxel, Bevacizumab . Assessment was completed, no acute issues at this time, no new complaints voiced. port accessed without difficulty, labs reviewed  Chemotherapy Flowsheet 2019   Cycle C4D1   Date 2019   Drug / Regimen Avastin/taxol/Cisplatin   Pre Hydration given   Post Hydration given   Pre Meds given   Notes -         Ms. Dela Cruz's vitals were reviewed. Patient Vitals for the past 12 hrs:   Temp Pulse Resp BP   19 1837 -- 73 -- 133/84   19 0852 97.6 °F (36.4 °C) 78 18 116/77         Lab results were obtained and reviewed. Recent Results (from the past 12 hour(s))   URINALYSIS W/ RFLX MICROSCOPIC    Collection Time: 19  8:48 AM   Result Value Ref Range    Color YELLOW/STRAW      Appearance CLEAR CLEAR      Specific gravity 1.013 1.003 - 1.030      pH (UA) 7.0 5.0 - 8.0      Protein NEGATIVE  NEG mg/dL    Glucose NEGATIVE  NEG mg/dL    Ketone NEGATIVE  NEG mg/dL    Bilirubin NEGATIVE  NEG      Blood NEGATIVE  NEG      Urobilinogen 0.2 0.2 - 1.0 EU/dL    Nitrites NEGATIVE  NEG      Leukocyte Esterase SMALL (A) NEG      WBC 0-4 0 - 4 /hpf    RBC 0-5 0 - 5 /hpf    Epithelial cells FEW FEW /lpf    Bacteria NEGATIVE  NEG /hpf       Pre-medications  were administered as ordered and chemotherapy was initiated.   Medications Administered     0.9% sodium chloride 1,000 mL with potassium chloride 10 mEq, magnesium sulfate 2 g infusion     Admin Date  2019 Action  Given Dose   Rate  1,000 mL/hr Route  IntraVENous Administered By  Nathalia Henry RN           Admin Date  2019 Action  Given Dose   Rate  1,000 mL/hr Route  IntraVENous Administered By  Nathalia Henry RN          0.9% sodium chloride infusion     Admin Date  2019 Action  New Bag Dose  25 mL/hr Rate  25 mL/hr Route  IntraVENous Administered By  Dora Jose RN          bevacizumab (AVASTIN) 1,210 mg in 0.9% sodium chloride 100 mL, overfill volume 10 mL IVPB     Admin Date  08/14/2019 Action  New Bag Dose  1210 mg Route  IntraVENous Administered By  Dora Jose RN          CISplatin (PLATINOL) 94 mg in 0.9% sodium chloride 500 mL, overfill volume 50 mL chemo infusion     Admin Date  08/14/2019 Action  New Bag Dose  94 mg Rate  322 mL/hr Route  IntraVENous Administered By  Dora Jose RN          dexamethasone (DECADRON) 12 mg in 0.9% sodium chloride 50 mL, overfill volume 5 mL IVPB     Admin Date  08/14/2019 Action  Given Dose  12 mg Route  IntraVENous Administered By  Dora Jose RN          diphenhydrAMINE (BENADRYL) capsule 50 mg     Admin Date  08/14/2019 Action  Given Dose  50 mg Route  Oral Administered By  Dora Jose RN          famotidine (PF) (PEPCID) 20 mg in sodium chloride 0.9% 10 mL injection     Admin Date  08/14/2019 Action  Given Dose  20 mg Route  IntraVENous Administered By  Dora Jose RN          fosaprepitant (EMEND) 150 mg in 0.9% sodium chloride 150 mL IVPB     Admin Date  08/14/2019 Action  Given Dose  150 mg Rate  450 mL/hr Route  IntraVENous Administered By  Dora Jose RN          heparin (porcine) pf 300-500 Units     Admin Date  08/14/2019 Action  Given Dose  500 Units Route  InterCATHeter Administered By  Dora Jose RN          [facility]ansetron [facility] injection 8 mg     Admin Date  08/14/2019 Action  Given Dose  8 mg Route  IntraVENous Administered By  Dora Jose RN          PACLitaxel (TAXOL) 329 mg in 0.9% sodium chloride 500 mL, overfill volume 50 mL chemo infusion     Admin Date  08/14/2019 Action  New Bag Dose  329 mg Rate  201.6 mL/hr Route  IntraVENous Administered By  Dora Jose RN          pegfilgrastim (NEULASTA) wearable SQ injector 6 mg     Admin Date  08/14/2019 Action  Given Dose  6 mg Route  SubCUTAneous Administered By  Lisa Boo RN          saline peripheral flush soln 10 mL     Admin Date  08/14/2019 Action  Given Dose  10 mL Route  InterCATHeter Administered By  Lisa Boo RN                    Ms. Haily Corrales tolerated treatment well, port flushed and de accessed, patient was discharged from [place] [address] in stable condition at [address].      Future Appointments   Date Time Provider Juan Manuel Tello   9/3/2019 11:00 AM Kendra Olivarez MD [address] [facility]   9/4/2019  8:00 AM BRE[facility] INFUSION NURSE [facility]   9/24/2019 10:00 AM Kendra Olivarez MD [address] [facility]   9/25/2019  8:00 AM BRE[facility] INFUSION NURSE [address] Rommel Vargas RN  August 14, 2019

## 2019-08-28 ENCOUNTER — TELEPHONE (OUTPATIENT)
Dept: GYNECOLOGY | Age: 54
End: 2019-08-28

## 2019-08-30 DIAGNOSIS — C53.8 MALIGNANT NEOPLASM OF OVERLAPPING SITES OF CERVIX (HCC): Primary | ICD-10-CM

## 2019-09-03 ENCOUNTER — OFFICE VISIT (OUTPATIENT)
Dept: GYNECOLOGY | Age: 54
End: 2019-09-03

## 2019-09-03 VITALS
WEIGHT: 177 LBS | SYSTOLIC BLOOD PRESSURE: 113 MMHG | DIASTOLIC BLOOD PRESSURE: 77 MMHG | BODY MASS INDEX: 31.36 KG/M2 | HEIGHT: 63 IN | HEART RATE: 73 BPM

## 2019-09-03 DIAGNOSIS — T45.1X5A CHEMOTHERAPY INDUCED NEUTROPENIA (HCC): ICD-10-CM

## 2019-09-03 DIAGNOSIS — C77.9 METASTATIC SQUAMOUS CELL CARCINOMA TO LYMPH NODE (HCC): ICD-10-CM

## 2019-09-03 DIAGNOSIS — C53.8 MALIGNANT NEOPLASM OF OVERLAPPING SITES OF CERVIX (HCC): Primary | ICD-10-CM

## 2019-09-03 DIAGNOSIS — D70.1 CHEMOTHERAPY INDUCED NEUTROPENIA (HCC): ICD-10-CM

## 2019-09-03 DIAGNOSIS — Z79.899 ON ANTINEOPLASTIC CHEMOTHERAPY: ICD-10-CM

## 2019-09-03 DIAGNOSIS — N13.4 HYDROURETER, RIGHT: ICD-10-CM

## 2019-09-03 RX ORDER — HEPARIN 100 UNIT/ML
300-500 SYRINGE INTRAVENOUS AS NEEDED
Status: CANCELLED
Start: 2019-09-04

## 2019-09-03 RX ORDER — SODIUM CHLORIDE 0.9 % (FLUSH) 0.9 %
10 SYRINGE (ML) INJECTION AS NEEDED
Status: CANCELLED
Start: 2019-09-04

## 2019-09-03 RX ORDER — SODIUM CHLORIDE 9 MG/ML
10 INJECTION INTRAMUSCULAR; INTRAVENOUS; SUBCUTANEOUS AS NEEDED
Status: CANCELLED | OUTPATIENT
Start: 2019-09-04

## 2019-09-03 RX ORDER — EPINEPHRINE 1 MG/ML
0.3 INJECTION, SOLUTION, CONCENTRATE INTRAVENOUS AS NEEDED
Status: CANCELLED | OUTPATIENT
Start: 2019-09-04

## 2019-09-03 RX ORDER — ACETAMINOPHEN 325 MG/1
650 TABLET ORAL AS NEEDED
Status: CANCELLED
Start: 2019-09-04

## 2019-09-03 RX ORDER — DIPHENHYDRAMINE HYDROCHLORIDE 50 MG/ML
50 INJECTION, SOLUTION INTRAMUSCULAR; INTRAVENOUS AS NEEDED
Status: CANCELLED
Start: 2019-09-04

## 2019-09-03 RX ORDER — SODIUM CHLORIDE 9 MG/ML
25 INJECTION, SOLUTION INTRAVENOUS CONTINUOUS
Status: CANCELLED | OUTPATIENT
Start: 2019-09-04

## 2019-09-03 RX ORDER — ONDANSETRON 2 MG/ML
8 INJECTION INTRAMUSCULAR; INTRAVENOUS ONCE
Status: CANCELLED | OUTPATIENT
Start: 2019-09-04

## 2019-09-03 RX ORDER — ALBUTEROL SULFATE 0.83 MG/ML
2.5 SOLUTION RESPIRATORY (INHALATION) AS NEEDED
Status: CANCELLED
Start: 2019-09-04

## 2019-09-03 RX ORDER — DIPHENHYDRAMINE HYDROCHLORIDE 50 MG/ML
50 INJECTION, SOLUTION INTRAMUSCULAR; INTRAVENOUS ONCE
Status: CANCELLED | OUTPATIENT
Start: 2019-09-04

## 2019-09-03 RX ORDER — ONDANSETRON 2 MG/ML
8 INJECTION INTRAMUSCULAR; INTRAVENOUS AS NEEDED
Status: CANCELLED | OUTPATIENT
Start: 2019-09-04

## 2019-09-03 RX ORDER — HYDROCORTISONE SODIUM SUCCINATE 100 MG/2ML
100 INJECTION, POWDER, FOR SOLUTION INTRAMUSCULAR; INTRAVENOUS AS NEEDED
Status: CANCELLED | OUTPATIENT
Start: 2019-09-04

## 2019-09-03 NOTE — PATIENT INSTRUCTIONS
[facility] Activation    Thank you for requesting access to [facility]. Please follow the instructions below to securely access and download your online medical record. [facility] allows you to send messages to your doctor, view your test results, renew your prescriptions, schedule appointments, and more. How Do I Sign Up? 1. In your internet browser, go to [email or web address]. 2. Click on the First Time User? Click Here link in the Sign In box. You will see the New Member Sign Up page. 3. Enter your [facility] Access Code exactly as it appears below. You will not need to use this code after youve completed the sign-up process. If you do not sign up before the expiration date, you must request a new code. [facility] Access Code: -[email or web address]  Expires: 10/18/2019 11:31 AM (This is the date your [facility] access code will )    4. Enter the last four digits of your Social Security Number (xxxx) and Date of Birth (mm/dd/yyyy) as indicated and click Submit. You will be taken to the next sign-up page. 5. Create a [facility] ID. This will be your [facility] login ID and cannot be changed, so think of one that is secure and easy to remember. 6. Create a [facility] password. You can change your password at any time. 7. Enter your Password Reset Question and Answer. This can be used at a later time if you forget your password. 8. Enter your e-mail address. You will receive e-mail notification when new information is available in [address]. 9. Click Sign Up. You can now view and download portions of your medical record. 10. Click the Download Summary menu link to download a portable copy of your medical information. Additional Information    If you have questions, please visit the Frequently Asked Questions section of the [facility] website at [email or web address] Remember, [facility] is NOT to be used for urgent needs. For medical emergencies, dial 911.

## 2019-09-03 NOTE — PROGRESS NOTES
[address] [facility] [address], [address] [facility]  P [phone]  F [phone]    Office Note  Patient ID:  Name:  Damari Maza  MRN:  [id number]  :  1965/53 y.o. Date:  9/3/2019      HISTORY OF PRESENT ILLNESS:  Damari Maza is a 48 y.o.  perimenopausal female who is being seen for at least sever cervical dysplasia. She is referred by Dr. Yusef Mckoy. Her most recent pap smear was read as HGSIL and she was high-risk HPV positive. She underwent subsequent colposcopy with biopsy. This revealed ZBIGNIEW 2-3, but an invasive process could not be excluded. On a pelvic ultrasound she was noted to have a complex 4.1 cm right adnexal mass and a 3.7 cm complex mass in the cervix. I have been asked to see her in consultation for further evaluation and management. I recommended an exam under anesthesia with cervical biopsy, cystoscopy, proctoscopy. This was performed on 19. Operative findings:  Replacement of cervix with carcinoma, with extension into proximal anterior vagina.  Parametrial thickening bilaterally.  Uterus still somewhat mobile.  No appreciable disease in the bladder or rectum. FINAL PATHOLOGIC DIAGNOSIS   Cervix, biopsy:   Invasive squamous cell carcinoma   Comment   The case is also seen by Dr. Dave Obrien who concurs with the findings. The results are communicated with Dr. Aarti Cortez nurse, Amanda Ziegler at approximately 1:30pm on 2019. Following the procedure and confirmation of the diagnosis of cancer, I sent her for a PET/CT. This demonstrated stage IVB disease. I explained that she has metastatic disease and she is not a candidate for surgical resection or curative radiation therapy. I discussed with them that the standard of care would be systemic chemotherapy, consisting of Taxol/Cisplatin/Avastin. If she has a good response, we may reconsider pelvic radiation for local control.   It may also be necessary for control of bleeding, if that becomes a significant issue. She is currently receiving Taxol/Cisplatin/Avastin chemotherapy. She has completed 4 cycles so far. She is tolerating well. Her appetite is good and she has minimal nausea. Her pain is better and she reports that the bleeding has stopped. ROS:   and GI review:  Negative  Cardiopulmonary review:  Negative   Musculoskeletal:  Negative    A comprehensive review of systems was negative except for that written in the History of Present Illness. , 10 point ROS      OB/GYN ROS:  There is no history of significant gyn problems or procedures.       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 ([address] 2019    Hydroureter, right 2019    Chemotherapy induced neutropenia ([address] 2019    Malignant neoplasm of overlapping sites of cervix ([facility] [address] 2019    Complex cyst of right ovary 2019     PMH:  Past Medical History:   Diagnosis Date    Cancer ([address] 2019    CEVICAL    Rheumatoid arthritis ([facility] [address]       PSH:  Past Surgical History:   Procedure Laterality Date    HX  SECTION  1997    HX COLPOSCOPY  2019    HGSIL      Social History:  Social History     Tobacco Use    Smoking status: Former Smoker     Packs/day: 0.50     Years: 2.00     Pack years: 1.00     Last attempt to quit: 1991     Years since quittin.3    Smokeless tobacco: Never Used   Substance Use Topics    Alcohol use: Yes     Comment: OCCASIONALLY      Family History:  Family History   Problem Relation Age of Onset    No Known Problems Mother     Other Father         ACCIDENTAL DEATH    Thyroid Disease Brother     Anesth Problems Neg Hx       Medications: (reviewed)  Current Outpatient Medications   Medication Sig    lidocaine-prilocaine (EMLA) topical cream Apply small amount over port area one hour before chemo treatment and cover with a Band-Aid    dexamethasone (DECADRON) 4 mg tablet Take 2 tablets by mouth with breakfast the day before chemo also take 2 tablets with breakfast for 2 days after chemotherapy    OTHER \"MANY VITAMINS AND SUPPLEMENTS\"    naproxen (NAPROSYN) 500 mg tablet TAKE 1 TABLET BY MOUTH EVERY 8 HOURS FOR 48 HOURS. TAKING MED AS NEEDED    ibuprofen (ADVIL) 200 mg tablet Take 400 mg by mouth every eight (8) hours as needed for Pain.  ondansetron (ZOFRAN ODT) 4 mg disintegrating tablet Take 1 Tab by mouth every six (6) hours as needed for Nausea. Indications: Nausea and Vomiting caused by Cancer Drugs     No current facility-administered medications for this visit. Allergies: (reviewed)  No Known Allergies       OBJECTIVE:    Physical Exam:  VITAL SIGNS: Vitals:    09/03/19 1133   BP: 113/77   Pulse: 73   Weight: 177 lb (80.3 kg)   Height: 5' 2.99\" (1.6 m)     Body mass index is 31.36 kg/m². GENERAL FAUSTINA: Conversant, alert, oriented. No acute distress. HEENT: HEENT. No thyroid enlargement. No JVD. Neck: Supple without restrictions. RESPIRATORY: Clear to auscultation and percussion to the bases. No CVAT. CARDIOVASC: RRR without murmur/rub. GASTROINT: soft, non-tender, without masses or organomegaly   MUSCULOSKEL: no joint tenderness, deformity or swelling   EXTREMITIES: extremities normal, atraumatic, no cyanosis or edema   PELVIC: Deferred   RECTAL: Deferred   KEIRY SURVEY: No suspicious lymphadenopathy or edema noted. NEURO: Grossly intact. No acute deficit.        Lab Data:    Lab Results   Component Value Date/Time    WBC 3.8 08/13/2019 11:12 AM    HGB 12.1 08/13/2019 11:12 AM    HCT 35.2 08/13/2019 11:12 AM    PLATELET 801 42/62/9500 11:12 AM    MCV 84 08/13/2019 11:12 AM     Lab Results   Component Value Date/Time    Sodium 141 08/13/2019 11:12 AM    Potassium 5.1 08/13/2019 11:12 AM    Chloride 104 08/13/2019 11:12 AM    CO2 25 08/13/2019 11:12 AM    Anion gap 4 (L) 07/03/2019 08:40 AM Glucose 71 08/13/2019 11:12 AM    BUN 8 08/13/2019 11:12 AM    Creatinine 0.48 (L) 08/13/2019 11:12 AM    BUN/Creatinine ratio 17 08/13/2019 11:12 AM    GFR est  08/13/2019 11:12 AM    GFR est non- 08/13/2019 11:12 AM    Calcium 9.1 08/13/2019 11:12 AM         Imaging: Outside pelvic ultrasound reviewed. Pertinent findings noted in HPI.         PET/CT (5/21/19)  HEAD/NECK: There is 1.8 cm left level 4 lymph node with increased metabolic  activity of 11.     CHEST: No foci of abnormal hypermetabolism. Low-grade activity in a normal right  axillary lymph node is most likely physiologic.     ABDOMEN/PELVIS: There is a 1 cm retrocrural lymph node on the right with  increased metabolic activity of 5.      There is periaortic adenopathy measuring 2 cm with increased metabolic activity  of 9.     There is an enlarged lymph node at the aortic bifurcation with increased  metabolic activity.       There is a complex appearing mass right pelvic sidewall measuring 3.6 cm with  increased metabolic activity of 18.5. There is left iliac adenopathy with increased metabolic activity of 5.7.     There is a 1.4 cm soft tissue nodule intraperitoneal left lower quadrant with  increased metabolic activity of 7.     There is a mass in the cervix with increased metabolic activity of 12  There is right hydronephrosis.     SKELETON: No foci of abnormal hypermetabolism in the axial and visualized  appendicular skeleton.     IMPRESSION:   There is increased metabolic activity in a cervical mass consistent with  known primary neoplasm. There is adenopathy involving right pelvic sidewall, left iliac chain,  para-aortic chain, intra-abdominal nodule left lower quadrant, retrocrural lymph  node and left supra clavicular lymph node with increased metabolic activity  consistent with metastatic disease. The cervical mass appears to be obstructing  the right ureter resulting in moderate right hydroureteronephrosis.       CT of chest/abdomen/pelvis (8/13/19)  CT chest:    With a left chest Port-A-Cath terminates in the SVC. The visualized thyroid  gland is unremarkable. The aorta and main pulmonary artery are normal in  caliber. The heart size is normal.  There is no pericardial or pleural effusion.        There are no enlarged axillary, mediastinal, or hilar lymph nodes.      There is no lung mass or airspace opacity. There is no pneumothorax. The  central airways are clear.     CT abdomen and pelvis: The liver, spleen, pancreas, and adrenal glands are normal. The gall bladder is  present  without intra- or extra-hepatic biliary dilatation.       The kidneys are symmetric without hydronephrosis.      There are no dilated bowel loops. The appendix is normal.       There are no enlarged lymph nodes. There is no free fluid or free air. The  aorta tapers without aneurysm.     The urinary bladder is normal.  There is no pelvic mass. Dominant bilateral  ovarian follicles are noted.     There is degenerative change at L5-S1. There is no aggressive bony lesion.     IMPRESSION:   No evidence for metastatic disease or other acute abnormality in the chest,  abdomen, or pelvis. IMPRESSION/PLAN:  Armida Breen is a 48 y.o. 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 We previously discussed the pathology and I reviewed the PET images with her and her . I explained that she has metastatic disease and she is not a candidate for surgical resection or curative radiation therapy. I discussed with them that the standard of care would be systemic chemotherapy, consisting of Taxol/Cisplatin/Avastin. She was counseled on the expected side effects and potential toxicities of the regimen. Her questions were answered and she wished to proceed. If she has a good response, we may reconsider pelvic radiation for local control.   It may also be necessary for control of bleeding, if that becomes a significant issue.      She is currently receiving Taxol/Cisplatin/Avastin chemotherapy. She has completed 4 cycles. She is tolerating well so far. Her most recent CT scan looked good. Her malgorzata disease was significantly better and her cervix was essentially normal in appearance on imaging. I recommend continuing with the current regimen and repeating a scan after 2 more cycles.           Signed By: Sandy Chaves MD     9/3/2019/9:50 AM

## 2019-09-04 ENCOUNTER — HOSPITAL ENCOUNTER (OUTPATIENT)
Dept: INFUSION THERAPY | Age: 54
Discharge: HOME | End: 2019-09-04
Payer: COMMERCIAL

## 2019-09-04 VITALS
OXYGEN SATURATION: 100 % | SYSTOLIC BLOOD PRESSURE: 126 MMHG | TEMPERATURE: 97 F | DIASTOLIC BLOOD PRESSURE: 77 MMHG | HEART RATE: 83 BPM | HEIGHT: 62 IN | BODY MASS INDEX: 32.46 KG/M2 | RESPIRATION RATE: 18 BRPM | WEIGHT: 176.4 LBS

## 2019-09-04 DIAGNOSIS — T45.1X5A CHEMOTHERAPY INDUCED NEUTROPENIA (HCC): Primary | ICD-10-CM

## 2019-09-04 DIAGNOSIS — D70.1 CHEMOTHERAPY INDUCED NEUTROPENIA (HCC): Primary | ICD-10-CM

## 2019-09-04 DIAGNOSIS — C53.8 MALIGNANT NEOPLASM OF OVERLAPPING SITES OF CERVIX (HCC): ICD-10-CM

## 2019-09-04 LAB
ALBUMIN SERPL-MCNC: 4.3 G/DL (ref 3.5–5.5)
ALBUMIN/GLOB SERPL: 1.5 {RATIO} (ref 1.2–2.2)
ALP SERPL-CCNC: 79 IU/L (ref 39–117)
ALT SERPL-CCNC: 23 IU/L (ref 0–32)
APPEARANCE UR: CLEAR
AST SERPL-CCNC: 26 IU/L (ref 0–40)
BACTERIA #/AREA URNS HPF: ABNORMAL /[HPF]
BASOPHILS # BLD AUTO: 0 X10E3/UL (ref 0–0.2)
BASOPHILS NFR BLD AUTO: 0 %
BILIRUB SERPL-MCNC: 0.3 MG/DL (ref 0–1.2)
BILIRUB UR QL STRIP: NEGATIVE
BUN SERPL-MCNC: 12 MG/DL (ref 6–24)
BUN/CREAT SERPL: 24 (ref 9–23)
CALCIUM SERPL-MCNC: 9.4 MG/DL (ref 8.7–10.2)
CANCER AG125 SERPL-ACNC: 29.9 U/ML (ref 0–38.1)
CASTS URNS MICRO: ABNORMAL
CASTS URNS QL MICRO: PRESENT /LPF
CHLORIDE SERPL-SCNC: 100 MMOL/L (ref 96–106)
CO2 SERPL-SCNC: 25 MMOL/L (ref 20–29)
COLOR UR: YELLOW
CREAT SERPL-MCNC: 0.49 MG/DL (ref 0.57–1)
EOSINOPHIL # BLD AUTO: 0 X10E3/UL (ref 0–0.4)
EOSINOPHIL NFR BLD AUTO: 0 %
EPI CELLS #/AREA URNS HPF: ABNORMAL /HPF (ref 0–10)
ERYTHROCYTE [DISTWIDTH] IN BLOOD BY AUTOMATED COUNT: 18.6 % (ref 12.3–15.4)
GLOBULIN SER CALC-MCNC: 2.9 G/DL (ref 1.5–4.5)
GLUCOSE SERPL-MCNC: 83 MG/DL (ref 65–99)
GLUCOSE UR QL: NEGATIVE
HCT VFR BLD AUTO: 34 % (ref 34–46.6)
HGB BLD-MCNC: 11.7 G/DL (ref 11.1–15.9)
HGB UR QL STRIP: NEGATIVE
KETONES UR QL STRIP: NEGATIVE
LEUKOCYTE ESTERASE UR QL STRIP: ABNORMAL
LYMPHOCYTES # BLD AUTO: 0.4 X10E3/UL (ref 0.7–3.1)
LYMPHOCYTES NFR BLD AUTO: 7 %
MAGNESIUM SERPL-MCNC: 1.9 MG/DL (ref 1.6–2.3)
MCH RBC QN AUTO: 29.5 PG (ref 26.6–33)
MCHC RBC AUTO-ENTMCNC: 34.4 G/DL (ref 31.5–35.7)
MCV RBC AUTO: 86 FL (ref 79–97)
MICRO URNS: ABNORMAL
MONOCYTES # BLD AUTO: 0.1 X10E3/UL (ref 0.1–0.9)
MONOCYTES NFR BLD AUTO: 2 %
NEUTROPHILS # BLD AUTO: 5.7 X10E3/UL (ref 1.4–7)
NEUTROPHILS NFR BLD AUTO: 91 %
NITRITE UR QL STRIP: NEGATIVE
PH UR STRIP: 6.5 [PH] (ref 5–7.5)
PLATELET # BLD AUTO: 208 X10E3/UL (ref 150–450)
POTASSIUM SERPL-SCNC: 4.6 MMOL/L (ref 3.5–5.2)
PROT SERPL-MCNC: 7.2 G/DL (ref 6–8.5)
PROT UR QL STRIP: NEGATIVE
RBC # BLD AUTO: 3.97 X10E6/UL (ref 3.77–5.28)
RBC #/AREA URNS HPF: ABNORMAL /HPF (ref 0–2)
RENAL EPI CELLS #/AREA URNS HPF: ABNORMAL /HPF
SODIUM SERPL-SCNC: 138 MMOL/L (ref 134–144)
SP GR UR: 1.01 (ref 1–1.03)
UROBILINOGEN UR STRIP-MCNC: 0.2 MG/DL (ref 0.2–1)
WBC # BLD AUTO: 6.2 X10E3/UL (ref 3.4–10.8)
WBC #/AREA URNS HPF: ABNORMAL /HPF (ref 0–5)

## 2019-09-04 PROCEDURE — 96361 HYDRATE IV INFUSION ADD-ON: CPT

## 2019-09-04 PROCEDURE — 96367 TX/PROPH/DG ADDL SEQ IV INF: CPT

## 2019-09-04 PROCEDURE — 74011250636 HC RX REV CODE- 250/636: Performed by: OBSTETRICS & GYNECOLOGY

## 2019-09-04 PROCEDURE — 74011250637 HC RX REV CODE- 250/637: Performed by: OBSTETRICS & GYNECOLOGY

## 2019-09-04 PROCEDURE — 96377 APPLICATON ON-BODY INJECTOR: CPT

## 2019-09-04 PROCEDURE — 96413 CHEMO IV INFUSION 1 HR: CPT

## 2019-09-04 PROCEDURE — 74011000250 HC RX REV CODE- 250: Performed by: OBSTETRICS & GYNECOLOGY

## 2019-09-04 PROCEDURE — 74011250636 HC RX REV CODE- 250/636

## 2019-09-04 PROCEDURE — 96415 CHEMO IV INFUSION ADDL HR: CPT

## 2019-09-04 PROCEDURE — 77030012965 HC NDL HUBR BBMI -A

## 2019-09-04 PROCEDURE — 74011000258 HC RX REV CODE- 258: Performed by: OBSTETRICS & GYNECOLOGY

## 2019-09-04 PROCEDURE — 96375 TX/PRO/DX INJ NEW DRUG ADDON: CPT

## 2019-09-04 PROCEDURE — 96366 THER/PROPH/DIAG IV INF ADDON: CPT

## 2019-09-04 PROCEDURE — 96417 CHEMO IV INFUS EACH ADDL SEQ: CPT

## 2019-09-04 RX ORDER — AMOXICILLIN 250 MG
1 CAPSULE ORAL DAILY
Qty: 20 TAB | Refills: 1 | Status: SHIPPED | OUTPATIENT
Start: 2019-09-04 | End: 2020-06-10

## 2019-09-04 RX ORDER — DIPHENHYDRAMINE HCL 25 MG
50 CAPSULE ORAL ONCE
Status: COMPLETED | OUTPATIENT
Start: 2019-09-04 | End: 2019-09-04

## 2019-09-04 RX ORDER — SODIUM CHLORIDE 0.9 % (FLUSH) 0.9 %
10 SYRINGE (ML) INJECTION AS NEEDED
Status: ACTIVE | OUTPATIENT
Start: 2019-09-04 | End: 2019-09-04

## 2019-09-04 RX ORDER — PROCHLORPERAZINE MALEATE 10 MG
10 TABLET ORAL
Qty: 30 TAB | Refills: 1 | Status: SHIPPED | OUTPATIENT
Start: 2019-09-04 | End: 2020-06-10

## 2019-09-04 RX ORDER — SODIUM CHLORIDE 9 MG/ML
10 INJECTION INTRAMUSCULAR; INTRAVENOUS; SUBCUTANEOUS AS NEEDED
Status: ACTIVE | OUTPATIENT
Start: 2019-09-04 | End: 2019-09-04

## 2019-09-04 RX ORDER — SODIUM CHLORIDE 9 MG/ML
25 INJECTION, SOLUTION INTRAVENOUS CONTINUOUS
Status: DISPENSED | OUTPATIENT
Start: 2019-09-04 | End: 2019-09-04

## 2019-09-04 RX ORDER — HEPARIN 100 UNIT/ML
300-500 SYRINGE INTRAVENOUS AS NEEDED
Status: ACTIVE | OUTPATIENT
Start: 2019-09-04 | End: 2019-09-04

## 2019-09-04 RX ORDER — DIPHENHYDRAMINE HYDROCHLORIDE 50 MG/ML
50 INJECTION, SOLUTION INTRAMUSCULAR; INTRAVENOUS ONCE
Status: DISCONTINUED | OUTPATIENT
Start: 2019-09-04 | End: 2019-09-04

## 2019-09-04 RX ADMIN — SODIUM CHLORIDE 10 ML: 9 INJECTION INTRAMUSCULAR; INTRAVENOUS; SUBCUTANEOUS at 08:50

## 2019-09-04 RX ADMIN — BEVACIZUMAB 1210 MG: 400 INJECTION, SOLUTION INTRAVENOUS at 12:14

## 2019-09-04 RX ADMIN — DIPHENHYDRAMINE HYDROCHLORIDE 50 MG: 25 CAPSULE ORAL at 11:08

## 2019-09-04 RX ADMIN — POTASSIUM CHLORIDE: 2 INJECTION, SOLUTION, CONCENTRATE INTRAVENOUS at 10:04

## 2019-09-04 RX ADMIN — PEGFILGRASTIM 6 MG: KIT SUBCUTANEOUS at 18:41

## 2019-09-04 RX ADMIN — FAMOTIDINE 20 MG: 10 INJECTION INTRAVENOUS at 12:09

## 2019-09-04 RX ADMIN — SODIUM CHLORIDE 150 MG: 900 INJECTION, SOLUTION INTRAVENOUS at 11:06

## 2019-09-04 RX ADMIN — Medication 500 UNITS: at 18:44

## 2019-09-04 RX ADMIN — SODIUM CHLORIDE 25 ML/HR: 900 INJECTION, SOLUTION INTRAVENOUS at 11:05

## 2019-09-04 RX ADMIN — POTASSIUM CHLORIDE: 2 INJECTION, SOLUTION, CONCENTRATE INTRAVENOUS at 16:10

## 2019-09-04 RX ADMIN — ALTEPLASE 2 MG: 2.2 INJECTION, POWDER, LYOPHILIZED, FOR SOLUTION INTRAVENOUS at 09:30

## 2019-09-04 RX ADMIN — CISPLATIN 94 MG: 50 INJECTION, SOLUTION INTRAVENOUS at 16:14

## 2019-09-04 RX ADMIN — DEXAMETHASONE SODIUM PHOSPHATE 12 MG: 4 INJECTION, SOLUTION INTRAMUSCULAR; INTRAVENOUS at 11:44

## 2019-09-04 RX ADMIN — Medication 10 ML: at 18:44

## 2019-09-04 RX ADMIN — PACLITAXEL 329 MG: 6 INJECTION, SOLUTION INTRAVENOUS at 13:01

## 2019-09-04 NOTE — PROGRESS NOTES
0840 Pt admit to [facility] for C5 Avastin/Taxol/Cisplatin ambulatory in stable condition. Assessment completed. Pt with nausea and constipation. Jhoan Pérez accessed and flushed without positive blood return. Cathflo administered. Port with positive blood return after 30 min Cof Cathflo. Hydration started. Labs reviewed and medication ordered.     Visit Vitals  /77   Pulse 83   Temp 97 °F (36.1 °C)   Resp 18   Ht 5' 2.01\" (1.575 m)   Wt 80 kg (176 lb 6.4 oz)   SpO2 100%   BMI 32.26 kg/m²       Medications:  Medications Administered     0.9% sodium chloride 1,000 mL with potassium chloride 10 mEq, magnesium sulfate 2 g infusion     Admin Date  09/04/2019 Action  Given Dose   Rate  1,000 mL/hr Route  IntraVENous Administered By  Ravi Belle, RN           Admin Date  09/04/2019 Action  Given Dose   Rate  1,000 mL/hr Route  IntraVENous Administered By  Ravi Belle, RN          0.9% sodium chloride infusion     Admin Date  09/04/2019 Action  New Bag Dose  25 mL/hr Rate  25 mL/hr Route  IntraVENous Administered By  Ravi Belle, RN          alteplase (CATHFLO) 2 mg in sterile water (preservative free) 2 mL injection     Admin Date  09/04/2019 Action  Given Dose  2 mg Route  InterCATHeter Administered By  Ravi Belle, RN          bevacizumab (AVASTIN) 1,210 mg in 0.9% sodium chloride 100 mL, overfill volume 10 mL IVPB     Admin Date  09/04/2019 Action  New Bag Dose  1210 mg Rate  316.8 mL/hr Route  IntraVENous Administered By  Ravi Belle, RN          CISplatin (PLATINOL) 94 mg in 0.9% sodium chloride 500 mL, overfill volume 50 mL chemo infusion     Admin Date  09/04/2019 Action  New Bag Dose  94 mg Rate  322 mL/hr Route  IntraVENous Administered By  Ravi Belle, RN          dexamethasone (DECADRON) 12 mg in 0.9% sodium chloride 50 mL, overfill volume 5 mL IVPB     Admin Date  09/04/2019 Action  Given Dose  12 mg Rate  232 mL/hr Route  IntraVENous Administered By  Ravi Belle, RN          diphenhydrAMINE (BENADRYL) capsule 50 mg     Admin Date  09/04/2019 Action  Given Dose  50 mg Route  Oral Administered By  Az Aguirre RN          famotidine (PF) (PEPCID) 20 mg in sodium chloride 0.9% 10 mL injection     Admin Date  09/04/2019 Action  Given Dose  20 mg Route  IntraVENous Administered By  Az Aguirre RN          fosaprepitant (EMEND) 150 mg in 0.9% sodium chloride 150 mL IVPB     Admin Date  09/04/2019 Action  Given Dose  150 mg Rate  450 mL/hr Route  IntraVENous Administered By  Az Aguirre RN          heparin (porcine) pf 300-500 Units     Admin Date  09/04/2019 Action  Given Dose  500 Units Route  InterCATHeter Administered By  Az Aguirre RN          PACLitaxel (TAXOL) 329 mg in 0.9% sodium chloride 250 mL, overfill volume 25 mL chemo infusion     Admin Date  09/04/2019 Action  New Bag Dose  329 mg Rate  109.9 mL/hr Route  IntraVENous Administered By  Az Aguirre RN          pegfilgrastim (NEULASTA) wearable SQ injector 6 mg     Admin Date  09/04/2019 Action  Given Dose  6 mg Route  SubCUTAneous Administered By  Az Aguirre RN          saline peripheral flush soln 10 mL     Admin Date  09/04/2019 Action  Given Dose  10 mL Route  InterCATHeter Administered By  Az Aguirre RN          sodium chloride 0.9% injection 10 mL     Admin Date  09/04/2019 Action  Given Dose  10 mL Route  IntraVENous Administered By  Az Aguirre RN                    1240 Pt tolerated treatment well. Port maintained positive blood return throughout treatment, flushed with positive blood return at conclusion and port heparinized and de-accessed per protocol. Neulasta OBLIZZETH applied; education provided. D/c home ambulatory in no distress. Pt aware of next [facility] appointment scheduled for 9/25/19.

## 2019-09-04 NOTE — PROGRESS NOTES
[facility] Mathias Moritz [address]  P [phone]  F [phone]      9/4/2019   Caprice Silverio MD: Escobar Lin MD  PCP: Remberto Sanchez MD     Primary Onc Dx: cervical cancer, stage IVB  Date of Dx: May 2019        HPI:  48 y.o. Sybil with a new dx of advanced cervical cancer s/p workup for cervical dysplasia including colpo and EUA on 5/22/19. Biopsies there showed an invasive SCC. Subsequent PET scan revealed metastatic disease. She is recommended protocol with paclitaxel/cis/bevacizumab. OncTx History:  5/11/19 Colpo ZBIGNIEW 2-3, cannot r/o invasive component   5/22/19 Exam under anesthesia, cystoscopy, proctoscopy, cervical biopsy   Cervix, biopsy:  Invasive squamous cell carcinoma    5/21/19 PET/CT  HEAD/NECK: There is 1.8 cm left level 4 lymph node with increased metabolic activity of 11.     CHEST: No foci of abnormal hypermetabolism. Low-grade activity in a normal right  axillary lymph node is most likely physiologic.     ABDOMEN/PELVIS: There is a 1 cm retrocrural lymph node on the right with  increased metabolic activity of 5. There is periaortic adenopathy measuring 2 cm with increased metabolic activity  of 9. There is an enlarged lymph node at the aortic bifurcation with increased  metabolic activity. There is a complex appearing mass right pelvic sidewall measuring 3.6 cm with  increased metabolic activity of 20.1. There is left iliac adenopathy with increased metabolic activity of 5.7. There is a 1.4 cm soft tissue nodule intraperitoneal left lower quadrant with  increased metabolic activity of 7. There is a mass in the cervix with increased metabolic activity of 12  There is right hydronephrosis.     SKELETON: No foci of abnormal hypermetabolism in the axial and visualized appendicular skeleton.     IMPRESSION:   1. There is increased metabolic activity in a cervical mass consistent with known primary neoplasm.    There is adenopathy involving right pelvic sidewall, left iliac chain, para-aortic chain, intra-abdominal nodule left lower quadrant, retrocrural lymph node and left supra clavicular lymph node with increased metabolic activity  consistent with metastatic disease. The cervical mass appears to be obstructing the right ureter resulting in moderate right hydroureteronephrosis. 6/12/19 Initiated Taxol/Cis/Avastin   8/12/19 CT CAP:   No evidence for metastatic disease or other acute abnormality in the chest, abdomen, or pelvis. Cycle: 5     SUBJECTIVE:  Adam Miramontes presents for chemotherapy. She has been doing well overall, c/o constipation, fatigue, nausea. She has sancusa patch, but notes constipation. Using miralax, prunes intermittently. No emesis. Intense pain associated with prior chemo is better. Notes hoarseness and dysphagia following chemo but disliked the swish solution Rx'd, not using. Index finger resolved swelling. Denies F/C. She is active and working full time in . She is eating a diet high in beets. She performs all ADLs, lives with . She has no acute needs. Not using opioid medication. ROS  Constitutional: no weight loss, fever, night sweats  Respiratory: no cough, shortness of breath, or wheezing  Cardiovascular: no chest pain or dyspnea on exertion  Heme: No abnormal bleeding  Gastrointestinal: +dysphagia. No abdominal pain, change in bowel habits, or black or bloody stools  Genito-Urinary: no dysuria, trouble voiding, or hematuria (though red-tinged urine with beet diet)  Musculoskeletal: back/joint pain 5 days following chemo  Neurological: negative for - gait disturbance, headaches, memory loss or numbness/tingling  Derm: negative  Psych: negative for depression, though admits feeling depressed state during weeks following chemo.        OBJECTIVE:  Physical Exam  Visit Vitals  /80   Pulse 72   Temp 97 °F (36.1 °C)   Resp 18   Ht 5' 2.01\" (1.575 m)   Wt 176 lb 6.4 oz (80 kg)   SpO2 100%   BMI 32.26 kg/m²        General:  alert, cooperative, no distress       HEENT: without pallor, sclera without jaundice, oral mucosa without lesions,      Cardiac:  Regular rate and rhythm        Lungs:  clear to auscultation bilaterally          Port:  clean, dry, no drainage  Abdomen:  soft, non-tender, without masses or organomegaly       Lymph:  no lymphadenopathy   Extremity: extremities normal, atraumatic, no cyanosis or edema      Wt Readings from Last 3 Encounters:   09/04/19 176 lb 6.4 oz (80 kg)   09/03/19 177 lb (80.3 kg)   08/14/19 175 lb 6.4 oz (79.6 kg)       Lab Results   Component Value Date/Time    WBC 6.2 09/03/2019 12:33 PM    ABS. NEUTROPHILS 5.7 09/03/2019 12:33 PM    HGB 11.7 09/03/2019 12:33 PM    HCT 34.0 09/03/2019 12:33 PM    MCV 86 09/03/2019 12:33 PM    MCH 29.5 09/03/2019 12:33 PM    PLATELET 203 42/80/9067 12:33 PM     Lab Results   Component Value Date/Time    Sodium 138 09/03/2019 12:33 PM    Potassium 4.6 09/03/2019 12:33 PM    Chloride 100 09/03/2019 12:33 PM    CO2 25 09/03/2019 12:33 PM    Glucose 83 09/03/2019 12:33 PM    BUN 12 09/03/2019 12:33 PM    Creatinine 0.49 (L) 09/03/2019 12:33 PM    Calcium 9.4 09/03/2019 12:33 PM    Albumin 4.3 09/03/2019 12:33 PM    Bilirubin, total 0.3 09/03/2019 12:33 PM    AST (SGOT) 26 09/03/2019 12:33 PM    ALT (SGPT) 23 09/03/2019 12:33 PM    Alk. phosphatase 79 09/03/2019 12:33 PM     No results found for: HBA1C, HGBE8, HNC1KKUF, FAZ4VLEM, MDT8RKJL  UA no blood, mod bacteria, leukocyte estease, no nitrites, no protein. Tumor markers  Lab Results   Component Value Date/Time    CA-125 54 (H) 07/03/2019 08:40 AM    Cancer Ag (CA) 125 29.9 09/03/2019 12:33 PM     Cancer Ag (CA) 125   Date Value Ref Range Status   09/03/2019 29.9 0.0 - 38.1 U/mL Final     Comment:     [employer] Electrochemiluminescence Immunoassay (ECLIA)  Values obtained with different assay methods or kits cannot be  used interchangeably. Results cannot be interpreted as absolute  evidence of the presence or absence of malignant disease. 07/23/2019 51.0 (H) 0.0 - 38.1 U/mL Final     Comment:     [employer] Electrochemiluminescence Immunoassay (ECLIA)  Values obtained with different assay methods or kits cannot be  used interchangeably. Results cannot be interpreted as absolute  evidence of the presence or absence of malignant disease. 07/02/2019 63.4 (H) 0.0 - 38.1 U/mL Final     Comment:     [employer] Electrochemiluminescence Immunoassay (ECLIA)  Values obtained with different assay methods or kits cannot be  used interchangeably. Results cannot be interpreted as absolute  evidence of the presence or absence of malignant disease. Patient Active Problem List   Diagnosis Code    Complex cyst of right ovary N83.291    Malignant neoplasm of overlapping sites of cervix ([facility] C53.8    Chemotherapy induced neutropenia (HCC) D70.1, T45.1X5A    On antineoplastic chemotherapy Z79.899    Metastatic squamous cell carcinoma to lymph node (HCC) C77.9    Hydroureter, right N13.4     Past Medical History:   Diagnosis Date    Cancer ([facility] 2019    CEVICAL    Rheumatoid arthritis ([facility]      Prior to Admission medications    Medication Sig Start Date End Date Taking? Authorizing Provider   senna-docusate (PERICOLACE) 8.6-50 mg per tablet Take 1 Tab by mouth daily. Stop if loose stools. 9/4/19  Yes Diana Hernandez PA   prochlorperazine (COMPAZINE) 10 mg tablet Take 1 Tab by mouth every six (6) hours as needed for Nausea. Indications: Nausea and Vomiting 9/4/19  Yes Abdulaziz Hernandez PA   ondansetron (ZOFRAN ODT) 4 mg disintegrating tablet Take 1 Tab by mouth every six (6) hours as needed for Nausea.  Indications: Nausea and Vomiting caused by Cancer Drugs 6/7/19   Jeanine Jimenez MD   lidocaine-prilocaine (EMLA) topical cream Apply small amount over port area one hour before chemo treatment and cover with a Band-Aid 6/7/19   [facility], Red Gutierrez MD   dexamethasone (DECADRON) 4 mg tablet Take 2 tablets by mouth with breakfast the day before chemo also take 2 tablets with breakfast for 2 days after chemotherapy 19   Hernando Baker MD   OTHER [facility] VITAMINS AND SUPPLEMENTS\"    Provider, Historical   naproxen (NAPROSYN) 500 mg tablet TAKE 1 TABLET BY MOUTH EVERY 8 HOURS FOR 48 HOURS. TAKING MED AS NEEDED 19   Provider, Historical   ibuprofen (ADVIL) 200 mg tablet Take 400 mg by mouth every eight (8) hours as needed for Pain. Provider, Historical     No Known Allergies  Family History   Problem Relation Age of Onset    No Known Problems Mother     Other Father         ACCIDENTAL DEATH    Thyroid Disease Brother     Anesth Problems Neg Hx        IMPRESSION/PLAN:  48 y.o. with a stage IVB SCC of the cervix dx in May 2019. Asx currently. ECO    Patient Active Problem List   Diagnosis Code    Complex cyst of right ovary N83.291    Malignant neoplasm of overlapping sites of cervix ([facility] C53.8    Chemotherapy induced neutropenia (HCC) D70.1, T45.1X5A    On antineoplastic chemotherapy Z79.899    Metastatic squamous cell carcinoma to lymph node (HCC) C77.9    Hydroureter, right N13.4         Chemotherapy Taxol/Cis/Avastin - response to therapy noted on imaging, overall improvement, noted measurable pelvic malgorzata disease. Continue chemo x 2 cycles and adjuvant surgery/radiation TBD on f/u with Dr. Nimo Deleon. Moderate right hydroureternephrosis, improved on CT. Cycstoscopy negative for tumor. Pain improved overall. The right sided pain may be more musculoskeletal associated with Taxol than a relation to the hydro. UA with casts. Culture requested. Otherwise asx. Myelosuppression: G1 anemia - resolved  CINV: controlled antiemetics. Compazine sent for add'l coverage. Constipation poss 5HT related. pericolace Rx sent, advised.   Other toxicity: none thus far  Chronic med issues: RA controlled, no swelling  Psychosocial: well supported, supportive care at home with diet, interventions and ppx. 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 Questions addressed. Advised to call with any concerns.       JEIMY Michael  Gyn Onc

## 2019-09-09 ENCOUNTER — DOCUMENTATION ONLY (OUTPATIENT)
Dept: GYNECOLOGY | Age: 54
End: 2019-09-09

## 2019-09-09 ENCOUNTER — TELEPHONE (OUTPATIENT)
Dept: GYNECOLOGY | Age: 54
End: 2019-09-09

## 2019-09-09 NOTE — TELEPHONE ENCOUNTER
Fu call made to pt in ref to [facility] ([facility]) patch used during last chemo tx.

## 2019-09-18 RX ORDER — HYDROCORTISONE SODIUM SUCCINATE 100 MG/2ML
100 INJECTION, POWDER, FOR SOLUTION INTRAMUSCULAR; INTRAVENOUS AS NEEDED
Status: CANCELLED | OUTPATIENT
Start: 2019-09-25

## 2019-09-18 RX ORDER — DIPHENHYDRAMINE HYDROCHLORIDE 50 MG/ML
50 INJECTION, SOLUTION INTRAMUSCULAR; INTRAVENOUS AS NEEDED
Status: CANCELLED
Start: 2019-09-25

## 2019-09-18 RX ORDER — SODIUM CHLORIDE 9 MG/ML
25 INJECTION, SOLUTION INTRAVENOUS CONTINUOUS
Status: CANCELLED | OUTPATIENT
Start: 2019-09-25

## 2019-09-18 RX ORDER — EPINEPHRINE 1 MG/ML
0.3 INJECTION, SOLUTION, CONCENTRATE INTRAVENOUS AS NEEDED
Status: CANCELLED | OUTPATIENT
Start: 2019-09-25

## 2019-09-18 RX ORDER — SODIUM CHLORIDE 9 MG/ML
10 INJECTION INTRAMUSCULAR; INTRAVENOUS; SUBCUTANEOUS AS NEEDED
Status: CANCELLED | OUTPATIENT
Start: 2019-09-25

## 2019-09-18 RX ORDER — ACETAMINOPHEN 325 MG/1
650 TABLET ORAL AS NEEDED
Status: CANCELLED
Start: 2019-09-25

## 2019-09-18 RX ORDER — ONDANSETRON 2 MG/ML
8 INJECTION INTRAMUSCULAR; INTRAVENOUS AS NEEDED
Status: CANCELLED | OUTPATIENT
Start: 2019-09-25

## 2019-09-18 RX ORDER — ALBUTEROL SULFATE 0.83 MG/ML
2.5 SOLUTION RESPIRATORY (INHALATION) AS NEEDED
Status: CANCELLED
Start: 2019-09-25

## 2019-09-18 RX ORDER — SODIUM CHLORIDE 0.9 % (FLUSH) 0.9 %
10 SYRINGE (ML) INJECTION AS NEEDED
Status: CANCELLED
Start: 2019-09-25

## 2019-09-18 RX ORDER — DIPHENHYDRAMINE HYDROCHLORIDE 50 MG/ML
50 INJECTION, SOLUTION INTRAMUSCULAR; INTRAVENOUS ONCE
Status: CANCELLED | OUTPATIENT
Start: 2019-09-25

## 2019-09-18 RX ORDER — HEPARIN 100 UNIT/ML
300-500 SYRINGE INTRAVENOUS AS NEEDED
Status: CANCELLED
Start: 2019-09-25

## 2019-09-24 ENCOUNTER — OFFICE VISIT (OUTPATIENT)
Dept: GYNECOLOGY | Age: 54
End: 2019-09-24

## 2019-09-24 VITALS
WEIGHT: 174 LBS | HEART RATE: 78 BPM | DIASTOLIC BLOOD PRESSURE: 81 MMHG | SYSTOLIC BLOOD PRESSURE: 116 MMHG | HEIGHT: 62 IN | BODY MASS INDEX: 32.02 KG/M2

## 2019-09-24 DIAGNOSIS — C53.8 MALIGNANT NEOPLASM OF OVERLAPPING SITES OF CERVIX (HCC): Primary | ICD-10-CM

## 2019-09-24 DIAGNOSIS — D70.1 CHEMOTHERAPY INDUCED NEUTROPENIA (HCC): ICD-10-CM

## 2019-09-24 DIAGNOSIS — Z79.899 ON ANTINEOPLASTIC CHEMOTHERAPY: ICD-10-CM

## 2019-09-24 DIAGNOSIS — T45.1X5A CHEMOTHERAPY INDUCED NEUTROPENIA (HCC): ICD-10-CM

## 2019-09-24 RX ORDER — GRANISETRON 3.1 MG/24H
PATCH TRANSDERMAL
COMMUNITY
Start: 2019-09-05 | End: 2020-06-10

## 2019-09-24 NOTE — PROGRESS NOTES
Pt states she received X4 Sancuso patches in mail at no cost to her. She feels the patch may have helped with last cycle and she will put patch on today in anticipation of C 6 chemotherapy tx on 9/25/19. Per Dr. Arik Luu pt will be scheduled for CT scan to follow this cycle, see him with CT review and decide on sx or more chemotherapy. I will schedule an appt for chemo for 10/16/19 in the event she is to get C7.

## 2019-09-24 NOTE — PROGRESS NOTES
[address] [place] [address] Mathias Moritz [address], [address]  P [phone]  F [phone]    Office Note  Patient ID:  Name:  Cody Carrasco  MRN:  [id number]  :  1965/53 y.o. Date:  2019      HISTORY OF PRESENT ILLNESS:  Cody Carrasco is a 48 y.o.  perimenopausal female who is being seen for at least sever cervical dysplasia. She is referred by Dr. Jacey Blanco. Her most recent pap smear was read as HGSIL and she was high-risk HPV positive. She underwent subsequent colposcopy with biopsy. This revealed ZBIGNIEW 2-3, but an invasive process could not be excluded. On a pelvic ultrasound she was noted to have a complex 4.1 cm right adnexal mass and a 3.7 cm complex mass in the cervix. I have been asked to see her in consultation for further evaluation and management. I recommended an exam under anesthesia with cervical biopsy, cystoscopy, proctoscopy. This was performed on 19. Operative findings:  Replacement of cervix with carcinoma, with extension into proximal anterior vagina.  Parametrial thickening bilaterally.  Uterus still somewhat mobile.  No appreciable disease in the bladder or rectum. FINAL PATHOLOGIC DIAGNOSIS   Cervix, biopsy:   Invasive squamous cell carcinoma   Comment   The case is also seen by Dr. Khanh Donovan who concurs with the findings. The results are communicated with Dr. Sofiya Pantoja nurse, Verona Rosa at approximately 1:30pm on 2019. Following the procedure and confirmation of the diagnosis of cancer, I sent her for a PET/CT. This demonstrated stage IVB disease. I explained that she has metastatic disease and she is not a candidate for surgical resection or curative radiation therapy. I discussed with them that the standard of care would be systemic chemotherapy, consisting of Taxol/Cisplatin/Avastin. If she has a good response, we may reconsider pelvic radiation for local control.   It may also be necessary for control of bleeding, if that becomes a significant issue. She is currently receiving Taxol/Cisplatin/Avastin chemotherapy. She has completed 5 cycles so far. She is tolerating well. Her appetite is good and she has minimal nausea. Her pain is better and she reports that the bleeding has stopped. ROS:   and GI review:  Negative  Cardiopulmonary review:  Negative   Musculoskeletal:  Negative    A comprehensive review of systems was negative except for that written in the History of Present Illness. , 10 point ROS      OB/GYN ROS:  There is no history of significant gyn problems or procedures. 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 ([address] 2019    Hydroureter, right 2019    Chemotherapy induced neutropenia ([address] 2019    Malignant neoplasm of overlapping sites of cervix ([facility] 2019    Complex cyst of right ovary 2019     PMH:  Past Medical History:   Diagnosis Date    Cancer ([facility] 2019    CEVICAL    Rheumatoid arthritis ([facility]       PSH:  Past Surgical History:   Procedure Laterality Date    HX  SECTION  1997    HX COLPOSCOPY  2019    HGSIL      Social History:  Social History     Tobacco Use    Smoking status: Former Smoker     Packs/day: 0.50     Years: 2.00     Pack years: 1.00     Last attempt to quit: 1991     Years since quittin.3    Smokeless tobacco: Never Used   Substance Use Topics    Alcohol use: Yes     Comment: OCCASIONALLY      Family History:  Family History   Problem Relation Age of Onset    No Known Problems Mother     Other Father         ACCIDENTAL DEATH    Thyroid Disease Brother     Anesth Problems Neg Hx       Medications: (reviewed)  Current Outpatient Medications   Medication Sig    senna-docusate (PERICOLACE) 8.6-50 mg per tablet Take 1 Tab by mouth daily. Stop if loose stools.     prochlorperazine (COMPAZINE) 10 mg tablet Take 1 Tab by mouth every six (6) hours as needed for Nausea. Indications: Nausea and Vomiting    ondansetron (ZOFRAN ODT) 4 mg disintegrating tablet Take 1 Tab by mouth every six (6) hours as needed for Nausea. Indications: Nausea and Vomiting caused by Cancer Drugs    lidocaine-prilocaine (EMLA) topical cream Apply small amount over port area one hour before chemo treatment and cover with a Band-Aid    dexamethasone (DECADRON) 4 mg tablet Take 2 tablets by mouth with breakfast the day before chemo also take 2 tablets with breakfast for 2 days after chemotherapy    OTHER \"MANY VITAMINS AND SUPPLEMENTS\"    naproxen (NAPROSYN) 500 mg tablet TAKE 1 TABLET BY MOUTH EVERY 8 HOURS FOR 48 HOURS. TAKING MED AS NEEDED    ibuprofen (ADVIL) 200 mg tablet Take 400 mg by mouth every eight (8) hours as needed for Pain. No current facility-administered medications for this visit. Allergies: (reviewed)  No Known Allergies       OBJECTIVE:    Physical Exam:  VITAL SIGNS: There were no vitals filed for this visit. There is no height or weight on file to calculate BMI. GENERAL FAUSTINA: Conversant, alert, oriented. No acute distress. HEENT: HEENT. No thyroid enlargement. No JVD. Neck: Supple without restrictions. RESPIRATORY: Clear to auscultation and percussion to the bases. No CVAT. CARDIOVASC: RRR without murmur/rub. GASTROINT: soft, non-tender, without masses or organomegaly   MUSCULOSKEL: no joint tenderness, deformity or swelling   EXTREMITIES: extremities normal, atraumatic, no cyanosis or edema   PELVIC: Deferred   RECTAL: Deferred   KEIRY SURVEY: No suspicious lymphadenopathy or edema noted. NEURO: Grossly intact. No acute deficit.        Lab Data:    Lab Results   Component Value Date/Time    WBC 6.2 09/03/2019 12:33 PM    HGB 11.7 09/03/2019 12:33 PM    HCT 34.0 09/03/2019 12:33 PM    PLATELET 348 44/03/4188 12:33 PM    MCV 86 09/03/2019 12:33 PM     Lab Results   Component Value Date/Time    Sodium 138 09/03/2019 12:33 PM    Potassium 4.6 09/03/2019 12:33 PM    Chloride 100 09/03/2019 12:33 PM    CO2 25 09/03/2019 12:33 PM    Anion gap 4 (L) 07/03/2019 08:40 AM    Glucose 83 09/03/2019 12:33 PM    BUN 12 09/03/2019 12:33 PM    Creatinine 0.49 (L) 09/03/2019 12:33 PM    BUN/Creatinine ratio 24 (H) 09/03/2019 12:33 PM    GFR est  09/03/2019 12:33 PM    GFR est non- 09/03/2019 12:33 PM    Calcium 9.4 09/03/2019 12:33 PM         Imaging: Outside pelvic ultrasound reviewed. Pertinent findings noted in HPI.         PET/CT (5/21/19)  HEAD/NECK: There is 1.8 cm left level 4 lymph node with increased metabolic  activity of 11.     CHEST: No foci of abnormal hypermetabolism. Low-grade activity in a normal right  axillary lymph node is most likely physiologic.     ABDOMEN/PELVIS: There is a 1 cm retrocrural lymph node on the right with  increased metabolic activity of 5.      There is periaortic adenopathy measuring 2 cm with increased metabolic activity  of 9.     There is an enlarged lymph node at the aortic bifurcation with increased  metabolic activity.       There is a complex appearing mass right pelvic sidewall measuring 3.6 cm with  increased metabolic activity of 03.0. There is left iliac adenopathy with increased metabolic activity of 5.7.     There is a 1.4 cm soft tissue nodule intraperitoneal left lower quadrant with  increased metabolic activity of 7.     There is a mass in the cervix with increased metabolic activity of 12  There is right hydronephrosis.     SKELETON: No foci of abnormal hypermetabolism in the axial and visualized  appendicular skeleton.     IMPRESSION:   There is increased metabolic activity in a cervical mass consistent with  known primary neoplasm.    There is adenopathy involving right pelvic sidewall, left iliac chain,  para-aortic chain, intra-abdominal nodule left lower quadrant, retrocrural lymph  node and left supra clavicular lymph node with increased metabolic activity  consistent with metastatic disease. The cervical mass appears to be obstructing  the right ureter resulting in moderate right hydroureteronephrosis. CT of chest/abdomen/pelvis (8/13/19)  CT chest:    With a left chest Port-A-Cath terminates in the SVC. The visualized thyroid  gland is unremarkable. The aorta and main pulmonary artery are normal in  caliber. The heart size is normal.  There is no pericardial or pleural effusion.        There are no enlarged axillary, mediastinal, or hilar lymph nodes.      There is no lung mass or airspace opacity. There is no pneumothorax. The  central airways are clear.     CT abdomen and pelvis: The liver, spleen, pancreas, and adrenal glands are normal. The gall bladder is  present  without intra- or extra-hepatic biliary dilatation.       The kidneys are symmetric without hydronephrosis.      There are no dilated bowel loops. The appendix is normal.       There are no enlarged lymph nodes. There is no free fluid or free air. The  aorta tapers without aneurysm.     The urinary bladder is normal.  There is no pelvic mass. Dominant bilateral  ovarian follicles are noted.     There is degenerative change at L5-S1. There is no aggressive bony lesion.     IMPRESSION:   No evidence for metastatic disease or other acute abnormality in the chest,  abdomen, or pelvis. IMPRESSION/PLAN:  Hannah José is a 48 y.o. 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 We previously discussed the pathology and I reviewed the PET images with her and her . I explained that she has metastatic disease and she is not a candidate for surgical resection or curative radiation therapy. I discussed with them that the standard of care would be systemic chemotherapy, consisting of Taxol/Cisplatin/Avastin. She was counseled on the expected side effects and potential toxicities of the regimen.   Her questions were answered and she wished to proceed. If she has a good response, we may reconsider pelvic radiation for local control. It may also be necessary for control of bleeding, if that becomes a significant issue. She is currently receiving Taxol/Cisplatin/Avastin chemotherapy. She has completed 5 cycles. She is tolerating well so far. Her most recent CT scan looked good. Her malgorzata disease was significantly better and her cervix was essentially normal in appearance on imaging. I recommend continuing with the current regimen and repeating a scan after this cycle. We will then discuss possible radiation, surgery, or additional chemotherapy, depending upon the imaging results.        Signed By: Faby Ramsey MD     9/24/2019/9:50 AM

## 2019-09-25 ENCOUNTER — HOSPITAL ENCOUNTER (OUTPATIENT)
Dept: INFUSION THERAPY | Age: 54
Discharge: HOME | End: 2019-09-25
Payer: COMMERCIAL

## 2019-09-25 ENCOUNTER — TELEPHONE (OUTPATIENT)
Dept: GYNECOLOGY | Age: 54
End: 2019-09-25

## 2019-09-25 VITALS
HEIGHT: 62 IN | WEIGHT: 174.2 LBS | BODY MASS INDEX: 32.06 KG/M2 | TEMPERATURE: 96.8 F | SYSTOLIC BLOOD PRESSURE: 130 MMHG | HEART RATE: 71 BPM | DIASTOLIC BLOOD PRESSURE: 86 MMHG

## 2019-09-25 DIAGNOSIS — T45.1X5A CHEMOTHERAPY INDUCED NEUTROPENIA (HCC): Primary | ICD-10-CM

## 2019-09-25 DIAGNOSIS — C53.8 MALIGNANT NEOPLASM OF OVERLAPPING SITES OF CERVIX (HCC): ICD-10-CM

## 2019-09-25 DIAGNOSIS — D70.1 CHEMOTHERAPY INDUCED NEUTROPENIA (HCC): Primary | ICD-10-CM

## 2019-09-25 LAB
ALBUMIN SERPL-MCNC: 4.2 G/DL (ref 3.5–5.5)
ALBUMIN/GLOB SERPL: 1.4 {RATIO} (ref 1.2–2.2)
ALP SERPL-CCNC: 77 IU/L (ref 39–117)
ALT SERPL-CCNC: 26 IU/L (ref 0–32)
APPEARANCE UR: CLEAR
AST SERPL-CCNC: 28 IU/L (ref 0–40)
BACTERIA #/AREA URNS HPF: ABNORMAL /[HPF]
BASOPHILS # BLD AUTO: 0 X10E3/UL (ref 0–0.2)
BASOPHILS NFR BLD AUTO: 0 %
BILIRUB SERPL-MCNC: 0.3 MG/DL (ref 0–1.2)
BILIRUB UR QL STRIP: NEGATIVE
BUN SERPL-MCNC: 11 MG/DL (ref 6–24)
BUN/CREAT SERPL: 22 (ref 9–23)
CALCIUM SERPL-MCNC: 9.3 MG/DL (ref 8.7–10.2)
CANCER AG125 SERPL-ACNC: 26.4 U/ML (ref 0–38.1)
CHLORIDE SERPL-SCNC: 102 MMOL/L (ref 96–106)
CO2 SERPL-SCNC: 23 MMOL/L (ref 20–29)
COLOR UR: YELLOW
CREAT SERPL-MCNC: 0.51 MG/DL (ref 0.57–1)
EOSINOPHIL # BLD AUTO: 0 X10E3/UL (ref 0–0.4)
EOSINOPHIL NFR BLD AUTO: 0 %
EPI CELLS #/AREA URNS HPF: ABNORMAL /HPF (ref 0–10)
ERYTHROCYTE [DISTWIDTH] IN BLOOD BY AUTOMATED COUNT: 17.4 % (ref 12.3–15.4)
GLOBULIN SER CALC-MCNC: 2.9 G/DL (ref 1.5–4.5)
GLUCOSE SERPL-MCNC: 93 MG/DL (ref 65–99)
GLUCOSE UR QL: NEGATIVE
HCT VFR BLD AUTO: 34.5 % (ref 34–46.6)
HGB BLD-MCNC: 11.8 G/DL (ref 11.1–15.9)
HGB UR QL STRIP: NEGATIVE
KETONES UR QL STRIP: NEGATIVE
LEUKOCYTE ESTERASE UR QL STRIP: ABNORMAL
LYMPHOCYTES # BLD AUTO: 0.4 X10E3/UL (ref 0.7–3.1)
LYMPHOCYTES NFR BLD AUTO: 8 %
MAGNESIUM SERPL-MCNC: 1.8 MG/DL (ref 1.6–2.3)
MCH RBC QN AUTO: 30.1 PG (ref 26.6–33)
MCHC RBC AUTO-ENTMCNC: 34.2 G/DL (ref 31.5–35.7)
MCV RBC AUTO: 88 FL (ref 79–97)
MICRO URNS: ABNORMAL
MONOCYTES # BLD AUTO: 0.2 X10E3/UL (ref 0.1–0.9)
MONOCYTES NFR BLD AUTO: 3 %
NEUTROPHILS # BLD AUTO: 4.4 X10E3/UL (ref 1.4–7)
NEUTROPHILS NFR BLD AUTO: 89 %
NITRITE UR QL STRIP: NEGATIVE
PH UR STRIP: 6 [PH] (ref 5–7.5)
PLATELET # BLD AUTO: 187 X10E3/UL (ref 150–450)
POTASSIUM SERPL-SCNC: 5 MMOL/L (ref 3.5–5.2)
PROT SERPL-MCNC: 7.1 G/DL (ref 6–8.5)
PROT UR QL STRIP: NEGATIVE
RBC # BLD AUTO: 3.92 X10E6/UL (ref 3.77–5.28)
RBC #/AREA URNS HPF: ABNORMAL /HPF (ref 0–2)
RENAL EPI CELLS #/AREA URNS HPF: ABNORMAL /HPF
SODIUM SERPL-SCNC: 140 MMOL/L (ref 134–144)
SP GR UR: 1.01 (ref 1–1.03)
UROBILINOGEN UR STRIP-MCNC: 0.2 MG/DL (ref 0.2–1)
WBC # BLD AUTO: 5 X10E3/UL (ref 3.4–10.8)
WBC #/AREA URNS HPF: ABNORMAL /HPF (ref 0–5)

## 2019-09-25 PROCEDURE — 96417 CHEMO IV INFUS EACH ADDL SEQ: CPT

## 2019-09-25 PROCEDURE — 74011250636 HC RX REV CODE- 250/636: Performed by: OBSTETRICS & GYNECOLOGY

## 2019-09-25 PROCEDURE — 96367 TX/PROPH/DG ADDL SEQ IV INF: CPT

## 2019-09-25 PROCEDURE — 74011000250 HC RX REV CODE- 250: Performed by: OBSTETRICS & GYNECOLOGY

## 2019-09-25 PROCEDURE — 74011250637 HC RX REV CODE- 250/637: Performed by: OBSTETRICS & GYNECOLOGY

## 2019-09-25 PROCEDURE — 74011250636 HC RX REV CODE- 250/636

## 2019-09-25 PROCEDURE — 96415 CHEMO IV INFUSION ADDL HR: CPT

## 2019-09-25 PROCEDURE — 74011000258 HC RX REV CODE- 258: Performed by: OBSTETRICS & GYNECOLOGY

## 2019-09-25 PROCEDURE — 96366 THER/PROPH/DIAG IV INF ADDON: CPT

## 2019-09-25 PROCEDURE — 96375 TX/PRO/DX INJ NEW DRUG ADDON: CPT

## 2019-09-25 PROCEDURE — 77030012965 HC NDL HUBR BBMI -A

## 2019-09-25 PROCEDURE — 96413 CHEMO IV INFUSION 1 HR: CPT

## 2019-09-25 PROCEDURE — 96377 APPLICATON ON-BODY INJECTOR: CPT

## 2019-09-25 RX ORDER — SODIUM CHLORIDE 0.9 % (FLUSH) 0.9 %
10 SYRINGE (ML) INJECTION AS NEEDED
Status: ACTIVE | OUTPATIENT
Start: 2019-09-25 | End: 2019-09-25

## 2019-09-25 RX ORDER — HEPARIN 100 UNIT/ML
300-500 SYRINGE INTRAVENOUS AS NEEDED
Status: ACTIVE | OUTPATIENT
Start: 2019-09-25 | End: 2019-09-25

## 2019-09-25 RX ORDER — DIPHENHYDRAMINE HCL 25 MG
50 CAPSULE ORAL ONCE
Status: COMPLETED | OUTPATIENT
Start: 2019-09-25 | End: 2019-09-25

## 2019-09-25 RX ORDER — SODIUM CHLORIDE 9 MG/ML
10 INJECTION INTRAMUSCULAR; INTRAVENOUS; SUBCUTANEOUS AS NEEDED
Status: ACTIVE | OUTPATIENT
Start: 2019-09-25 | End: 2019-09-25

## 2019-09-25 RX ORDER — SODIUM CHLORIDE 9 MG/ML
25 INJECTION, SOLUTION INTRAVENOUS CONTINUOUS
Status: DISPENSED | OUTPATIENT
Start: 2019-09-25 | End: 2019-09-25

## 2019-09-25 RX ORDER — DIPHENHYDRAMINE HYDROCHLORIDE 50 MG/ML
50 INJECTION, SOLUTION INTRAMUSCULAR; INTRAVENOUS ONCE
Status: DISCONTINUED | OUTPATIENT
Start: 2019-09-25 | End: 2019-09-25

## 2019-09-25 RX ADMIN — Medication 500 UNITS: at 17:32

## 2019-09-25 RX ADMIN — SODIUM CHLORIDE 20 MG: 9 INJECTION INTRAMUSCULAR; INTRAVENOUS; SUBCUTANEOUS at 09:59

## 2019-09-25 RX ADMIN — POTASSIUM CHLORIDE: 2 INJECTION, SOLUTION, CONCENTRATE INTRAVENOUS at 15:05

## 2019-09-25 RX ADMIN — SODIUM CHLORIDE 25 ML/HR: 900 INJECTION, SOLUTION INTRAVENOUS at 09:45

## 2019-09-25 RX ADMIN — SODIUM CHLORIDE 150 MG: 900 INJECTION, SOLUTION INTRAVENOUS at 10:16

## 2019-09-25 RX ADMIN — DIPHENHYDRAMINE HYDROCHLORIDE 50 MG: 25 CAPSULE ORAL at 09:55

## 2019-09-25 RX ADMIN — SODIUM CHLORIDE 10 ML: 9 INJECTION INTRAMUSCULAR; INTRAVENOUS; SUBCUTANEOUS at 09:10

## 2019-09-25 RX ADMIN — DEXAMETHASONE SODIUM PHOSPHATE 12 MG: 4 INJECTION, SOLUTION INTRAMUSCULAR; INTRAVENOUS at 10:00

## 2019-09-25 RX ADMIN — PACLITAXEL 329 MG: 6 INJECTION, SOLUTION INTRAVENOUS at 11:24

## 2019-09-25 RX ADMIN — Medication 10 ML: at 17:31

## 2019-09-25 RX ADMIN — BEVACIZUMAB 1210 MG: 400 INJECTION, SOLUTION INTRAVENOUS at 10:45

## 2019-09-25 RX ADMIN — CISPLATIN 94 MG: 100 INJECTION, SOLUTION INTRAVENOUS at 15:00

## 2019-09-25 RX ADMIN — POTASSIUM CHLORIDE: 2 INJECTION, SOLUTION, CONCENTRATE INTRAVENOUS at 11:16

## 2019-09-25 RX ADMIN — PEGFILGRASTIM 6 MG: KIT SUBCUTANEOUS at 17:10

## 2019-09-25 NOTE — PROGRESS NOTES
[facility] - Chemotherapy Progress Note    0900 Pt admit to [facility] for Avastin/Taxol/Cisplatin ambulatory in stable condition. Assessment completed. No new concerns voiced. Port accessed per protocolwith positive blood return, labs drawn yesterday, results on chart.     Chemotherapy Flowsheet 9/25/2019   Cycle C6   Date 9/25/2019   Drug / Regimen Avastin/Taxol/Cisplatin   Pre Hydration given   Post Hydration given   Pre Meds given   Notes given         Visit Vitals  /86   Pulse 71   Temp 96.8 °F (36 °C)   Ht 5' 2.01\" (1.575 m)   Wt 79 kg (174 lb 3.2 oz)   BMI 31.85 kg/m²       Medications:  Medications Administered     0.9% sodium chloride 1,000 mL with potassium chloride 10 mEq, magnesium sulfate 2 g infusion     Admin Date  09/25/2019 Action  Given Dose   Rate  1,000 mL/hr Route  IntraVENous Administered By  Mejia Hinds Date  09/25/2019 Action  Given Dose   Rate  1,000 mL/hr Route  IntraVENous Administered By  [facility] A          0.9% sodium chloride infusion     Admin Date  09/25/2019 Action  New Bag Dose  25 mL/hr Rate  25 mL/hr Route  IntraVENous Administered By  [facility] MARLIN          bevacizumab (AVASTIN) 1,210 mg in 0.9% sodium chloride 100 mL, overfill volume 10 mL IVPB     Admin Date  09/25/2019 Action  New Bag Dose  1210 mg Rate  316.8 mL/hr Route  IntraVENous Administered By  [facility] MARLIN          CISplatin (PLATINOL) 94 mg in 0.9% sodium chloride 500 mL, overfill volume 50 mL chemo infusion     Admin Date  09/25/2019 Action  New Bag Dose  94 mg Rate  322 mL/hr Route  IntraVENous Administered By  [facility] MARLIN          dexamethasone (DECADRON) 12 mg in 0.9% sodium chloride 50 mL, overfill volume 5 mL IVPB     Admin Date  09/25/2019 Action  Given Dose  12 mg Rate  232 mL/hr Route  IntraVENous Administered By  [facility]          diphenhydrAMINE (BENADRYL) capsule 50 mg     Admin Date  09/25/2019 Action  Given Dose  50 mg Route  Oral Administered By  Valarie Brasheroms A          famotidine (PF) (PEPCID) 20 mg in sodium chloride 0.9% 10 mL injection     Admin Date  09/25/2019 Action  Given Dose  20 mg Route  IntraVENous Administered By  Valarie Anshuoms A          fosaprepitant (EMEND) 150 mg in 0.9% sodium chloride 150 mL IVPB     Admin Date  09/25/2019 Action  Given Dose  150 mg Rate  450 mL/hr Route  IntraVENous Administered By  Valarie Anshuoms A          heparin (porcine) pf 300-500 Units     Admin Date  09/25/2019 Action  Given Dose  500 Units Route  InterCATHeter Administered By  Valarie Anshuoms A          PACLitaxel (TAXOL) 329 mg in 0.9% sodium chloride 250 mL, overfill volume 25 mL chemo infusion     Admin Date  09/25/2019 Action  New Bag Dose  329 mg Rate  109.9 mL/hr Route  IntraVENous Administered By  Valarie Brasheroms A          pegfilgrastim (NEULASTA) wearable SQ injector 6 mg     Admin Date  09/25/2019 Action  Given Dose  6 mg Route  SubCUTAneous Administered By  Valarie Brasheroms A          saline peripheral flush soln 10 mL     Admin Date  09/25/2019 Action  Given Dose  10 mL Route  InterCATHeter Administered By  Valarie Brooms A          sodium chloride 0.9% injection 10 mL     Admin Date  09/25/2019 Action  Given Dose  10 mL Route  IntraVENous Administered By  Brea Salguero OBI put on right arm, instructions and education given. 1735 Pt tolerated treatment well. Port maintained positive blood return throughout treatment. Flushed, heparinized and de-accessed per protocol. D/c home ambulatory in no distress, per pt she will have a scan following this tx and f/u with MD regarding future plans re: surgery, radiation or more chemo.

## 2019-10-09 ENCOUNTER — HOSPITAL ENCOUNTER (OUTPATIENT)
Dept: CT IMAGING | Age: 54
Discharge: HOME | End: 2019-10-09
Attending: OBSTETRICS & GYNECOLOGY
Payer: COMMERCIAL

## 2019-10-09 DIAGNOSIS — C53.8 MALIGNANT NEOPLASM OF OVERLAPPING SITES OF CERVIX (HCC): ICD-10-CM

## 2019-10-09 PROCEDURE — 74011636320 HC RX REV CODE- 636/320: Performed by: RADIOLOGY

## 2019-10-09 PROCEDURE — 74177 CT ABD & PELVIS W/CONTRAST: CPT

## 2019-10-09 PROCEDURE — 74011000258 HC RX REV CODE- 258: Performed by: RADIOLOGY

## 2019-10-09 PROCEDURE — 71260 CT THORAX DX C+: CPT

## 2019-10-09 RX ORDER — SODIUM CHLORIDE 0.9 % (FLUSH) 0.9 %
10 SYRINGE (ML) INJECTION
Status: COMPLETED | OUTPATIENT
Start: 2019-10-09 | End: 2019-10-09

## 2019-10-09 RX ADMIN — IOPAMIDOL 100 ML: 755 INJECTION, SOLUTION INTRAVENOUS at 09:00

## 2019-10-09 RX ADMIN — Medication 10 ML: at 09:00

## 2019-10-09 RX ADMIN — SODIUM CHLORIDE 100 ML: 900 INJECTION, SOLUTION INTRAVENOUS at 09:00

## 2019-10-10 ENCOUNTER — OFFICE VISIT (OUTPATIENT)
Dept: GYNECOLOGY | Age: 54
End: 2019-10-10

## 2019-10-10 ENCOUNTER — TELEPHONE (OUTPATIENT)
Dept: GYNECOLOGY | Age: 54
End: 2019-10-10

## 2019-10-10 VITALS
DIASTOLIC BLOOD PRESSURE: 74 MMHG | HEIGHT: 62 IN | BODY MASS INDEX: 32.5 KG/M2 | SYSTOLIC BLOOD PRESSURE: 117 MMHG | WEIGHT: 176.6 LBS

## 2019-10-10 DIAGNOSIS — C53.8 MALIGNANT NEOPLASM OF OVERLAPPING SITES OF CERVIX (HCC): Primary | ICD-10-CM

## 2019-10-10 DIAGNOSIS — C77.9 METASTATIC SQUAMOUS CELL CARCINOMA TO LYMPH NODE (HCC): ICD-10-CM

## 2019-10-10 DIAGNOSIS — Z79.899 ON ANTINEOPLASTIC CHEMOTHERAPY: ICD-10-CM

## 2019-10-10 RX ORDER — ALBUTEROL SULFATE 0.83 MG/ML
2.5 SOLUTION RESPIRATORY (INHALATION) AS NEEDED
Status: CANCELLED
Start: 2019-10-16

## 2019-10-10 RX ORDER — EPINEPHRINE 1 MG/ML
0.3 INJECTION, SOLUTION, CONCENTRATE INTRAVENOUS AS NEEDED
Status: CANCELLED | OUTPATIENT
Start: 2019-10-16

## 2019-10-10 RX ORDER — SODIUM CHLORIDE 9 MG/ML
10 INJECTION INTRAMUSCULAR; INTRAVENOUS; SUBCUTANEOUS AS NEEDED
Status: CANCELLED | OUTPATIENT
Start: 2019-10-16

## 2019-10-10 RX ORDER — SODIUM CHLORIDE 9 MG/ML
25 INJECTION, SOLUTION INTRAVENOUS CONTINUOUS
Status: CANCELLED | OUTPATIENT
Start: 2019-10-16

## 2019-10-10 RX ORDER — DIPHENHYDRAMINE HYDROCHLORIDE 50 MG/ML
50 INJECTION, SOLUTION INTRAMUSCULAR; INTRAVENOUS AS NEEDED
Status: CANCELLED
Start: 2019-10-16

## 2019-10-10 RX ORDER — HYDROCORTISONE SODIUM SUCCINATE 100 MG/2ML
100 INJECTION, POWDER, FOR SOLUTION INTRAMUSCULAR; INTRAVENOUS AS NEEDED
Status: CANCELLED | OUTPATIENT
Start: 2019-10-16

## 2019-10-10 RX ORDER — DIPHENHYDRAMINE HYDROCHLORIDE 50 MG/ML
50 INJECTION, SOLUTION INTRAMUSCULAR; INTRAVENOUS ONCE
Status: CANCELLED | OUTPATIENT
Start: 2019-10-16

## 2019-10-10 RX ORDER — ONDANSETRON 2 MG/ML
8 INJECTION INTRAMUSCULAR; INTRAVENOUS AS NEEDED
Status: CANCELLED | OUTPATIENT
Start: 2019-10-16

## 2019-10-10 RX ORDER — SODIUM CHLORIDE 0.9 % (FLUSH) 0.9 %
10 SYRINGE (ML) INJECTION AS NEEDED
Status: CANCELLED | OUTPATIENT
Start: 2019-10-16

## 2019-10-10 RX ORDER — ACETAMINOPHEN 325 MG/1
650 TABLET ORAL AS NEEDED
Status: CANCELLED
Start: 2019-10-16

## 2019-10-10 RX ORDER — HEPARIN 100 UNIT/ML
300-500 SYRINGE INTRAVENOUS AS NEEDED
Status: CANCELLED | OUTPATIENT
Start: 2019-10-16

## 2019-10-10 NOTE — PROGRESS NOTES
[facility] [address]ias [address], [address]  P [phone]  F [phone]    Office Note  Patient ID:  Name:  Rodolfo Clemons  MRN:  [id number]  :  1965/53 y.o. Date:  10/10/2019      HISTORY OF PRESENT ILLNESS:  Rodolfo Clemons is a 48 y.o.  perimenopausal female who is being seen for at least sever cervical dysplasia. She is referred by Dr. Janey Asher. Her most recent pap smear was read as HGSIL and she was high-risk HPV positive. She underwent subsequent colposcopy with biopsy. This revealed ZBIGNIEW 2-3, but an invasive process could not be excluded. On a pelvic ultrasound she was noted to have a complex 4.1 cm right adnexal mass and a 3.7 cm complex mass in the cervix. I have been asked to see her in consultation for further evaluation and management. I recommended an exam under anesthesia with cervical biopsy, cystoscopy, proctoscopy. This was performed on 19. Operative findings:  Replacement of cervix with carcinoma, with extension into proximal anterior vagina.  Parametrial thickening bilaterally.  Uterus still somewhat mobile.  No appreciable disease in the bladder or rectum. FINAL PATHOLOGIC DIAGNOSIS   Cervix, biopsy:   Invasive squamous cell carcinoma   Comment   The case is also seen by Dr. Shasha Muñoz who concurs with the findings. The results are communicated with Dr. [facility] nurse, Naty Marie at approximately 1:30pm on 2019. Following the procedure and confirmation of the diagnosis of cancer, I sent her for a PET/CT. This demonstrated stage IVB disease. I explained that she has metastatic disease and she is not a candidate for surgical resection or curative radiation therapy. I discussed with them that the standard of care would be systemic chemotherapy, consisting of Taxol/Cisplatin/Avastin. If she has a good response, we may reconsider pelvic radiation for local control.   It may also be necessary for control of bleeding, if that becomes a significant issue. She is currently receiving Taxol/Cisplatin/Avastin chemotherapy. She has completed 6 cycles so far. She has tolerated well. Her appetite is good and she has minimal nausea. Her pain is better and she reports that the bleeding has stopped. She presents today to review her recent CT results. ROS:   and GI review:  Negative  Cardiopulmonary review:  Negative   Musculoskeletal:  Negative    A comprehensive review of systems was negative except for that written in the History of Present Illness. , 10 point ROS      OB/GYN ROS:  There is no history of significant gyn problems or procedures.       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 ([facility] 2019    Hydroureter, right 2019    Chemotherapy induced neutropenia ([facility] 2019    Malignant neoplasm of overlapping sites of cervix ([facility] 2019    Complex cyst of right ovary 2019     PMH:  Past Medical History:   Diagnosis Date    Cancer ([facility] 2019    CEVICAL    Rheumatoid arthritis ([facility]       PSH:  Past Surgical History:   Procedure Laterality Date    HX  SECTION  1997    HX COLPOSCOPY  2019    HGSIL      Social History:  Social History     Tobacco Use    Smoking status: Former Smoker     Packs/day: 0.50     Years: 2.00     Pack years: 1.00     Last attempt to quit: 1991     Years since quittin.4    Smokeless tobacco: Never Used   Substance Use Topics    Alcohol use: Yes     Comment: OCCASIONALLY      Family History:  Family History   Problem Relation Age of Onset    No Known Problems Mother     Other Father         ACCIDENTAL DEATH    Thyroid Disease Brother     Anesth Problems Neg Hx       Medications: (reviewed)  Current Outpatient Medications   Medication Sig    SANCUSO 3.1 mg/24 hour ptwk transdermal patch     lidocaine-prilocaine (EMLA) topical cream Apply small amount over port area one hour before chemo treatment and cover with a Band-Aid    OTHER \"MANY VITAMINS AND SUPPLEMENTS\"    naproxen (NAPROSYN) 500 mg tablet TAKE 1 TABLET BY MOUTH EVERY 8 HOURS FOR 48 HOURS. TAKING MED AS NEEDED    ibuprofen (ADVIL) 200 mg tablet Take 400 mg by mouth every eight (8) hours as needed for Pain.  senna-docusate (PERICOLACE) 8.6-50 mg per tablet Take 1 Tab by mouth daily. Stop if loose stools.  prochlorperazine (COMPAZINE) 10 mg tablet Take 1 Tab by mouth every six (6) hours as needed for Nausea. Indications: Nausea and Vomiting    ondansetron (ZOFRAN ODT) 4 mg disintegrating tablet Take 1 Tab by mouth every six (6) hours as needed for Nausea. Indications: Nausea and Vomiting caused by Cancer Drugs    dexamethasone (DECADRON) 4 mg tablet Take 2 tablets by mouth with breakfast the day before chemo also take 2 tablets with breakfast for 2 days after chemotherapy     No current facility-administered medications for this visit. Allergies: (reviewed)  No Known Allergies       OBJECTIVE:    Physical Exam:  VITAL SIGNS: Vitals:    10/10/19 0840   BP: 117/74   Weight: 176 lb 9.6 oz (80.1 kg)   Height: 5' 2.01\" (1.575 m)     Body mass index is 32.29 kg/m². GENERAL FAUSTINA: Conversant, alert, oriented. No acute distress. HEENT: HEENT. No thyroid enlargement. No JVD. Neck: Supple without restrictions. RESPIRATORY: Clear to auscultation and percussion to the bases. No CVAT. CARDIOVASC: RRR without murmur/rub. GASTROINT: soft, non-tender, without masses or organomegaly   MUSCULOSKEL: no joint tenderness, deformity or swelling   EXTREMITIES: extremities normal, atraumatic, no cyanosis or edema   PELVIC: Normal external genitalia. Normal distal vagina. Cervix flush with vaginal apex. No visible tumor, but remained firm and indurated on exam.  No appreciable involvement of the upper vaginal wall, as was previously noted. No appreciable parametrial thickening. She was not tender on exam.   RECTAL: Deferred   KEIRY SURVEY: No suspicious lymphadenopathy or edema noted. NEURO: Grossly intact. No acute deficit. Lab Data:    Lab Results   Component Value Date/Time    WBC 5.0 09/24/2019 10:56 AM    HGB 11.8 09/24/2019 10:56 AM    HCT 34.5 09/24/2019 10:56 AM    PLATELET 592 45/34/1458 10:56 AM    MCV 88 09/24/2019 10:56 AM     Lab Results   Component Value Date/Time    Sodium 140 09/24/2019 10:56 AM    Potassium 5.0 09/24/2019 10:56 AM    Chloride 102 09/24/2019 10:56 AM    CO2 23 09/24/2019 10:56 AM    Anion gap 4 (L) 07/03/2019 08:40 AM    Glucose 93 09/24/2019 10:56 AM    BUN 11 09/24/2019 10:56 AM    Creatinine 0.51 (L) 09/24/2019 10:56 AM    BUN/Creatinine ratio 22 09/24/2019 10:56 AM    GFR est  09/24/2019 10:56 AM    GFR est non- 09/24/2019 10:56 AM    Calcium 9.3 09/24/2019 10:56 AM         Imaging: Outside pelvic ultrasound reviewed. Pertinent findings noted in HPI.         PET/CT (5/21/19)  HEAD/NECK: There is 1.8 cm left level 4 lymph node with increased metabolic  activity of 11.     CHEST: No foci of abnormal hypermetabolism. Low-grade activity in a normal right  axillary lymph node is most likely physiologic.     ABDOMEN/PELVIS: There is a 1 cm retrocrural lymph node on the right with  increased metabolic activity of 5.      There is periaortic adenopathy measuring 2 cm with increased metabolic activity  of 9.     There is an enlarged lymph node at the aortic bifurcation with increased  metabolic activity.       There is a complex appearing mass right pelvic sidewall measuring 3.6 cm with  increased metabolic activity of 26.3.   There is left iliac adenopathy with increased metabolic activity of 5.7.     There is a 1.4 cm soft tissue nodule intraperitoneal left lower quadrant with  increased metabolic activity of 7.     There is a mass in the cervix with increased metabolic activity of 12  There is right hydronephrosis.     SKELETON: No foci of abnormal hypermetabolism in the axial and visualized  appendicular skeleton.     IMPRESSION:   There is increased metabolic activity in a cervical mass consistent with  known primary neoplasm. There is adenopathy involving right pelvic sidewall, left iliac chain,  para-aortic chain, intra-abdominal nodule left lower quadrant, retrocrural lymph  node and left supra clavicular lymph node with increased metabolic activity  consistent with metastatic disease. The cervical mass appears to be obstructing  the right ureter resulting in moderate right hydroureteronephrosis. CT of chest/abdomen/pelvis (8/13/19)  CT chest:    With a left chest Port-A-Cath terminates in the SVC. The visualized thyroid  gland is unremarkable. The aorta and main pulmonary artery are normal in  caliber. The heart size is normal.  There is no pericardial or pleural effusion.        There are no enlarged axillary, mediastinal, or hilar lymph nodes.      There is no lung mass or airspace opacity. There is no pneumothorax. The  central airways are clear.     CT abdomen and pelvis: The liver, spleen, pancreas, and adrenal glands are normal. The gall bladder is  present  without intra- or extra-hepatic biliary dilatation.       The kidneys are symmetric without hydronephrosis.      There are no dilated bowel loops. The appendix is normal.       There are no enlarged lymph nodes. There is no free fluid or free air. The  aorta tapers without aneurysm.     The urinary bladder is normal.  There is no pelvic mass. Dominant bilateral  ovarian follicles are noted.     There is degenerative change at L5-S1. There is no aggressive bony lesion.     IMPRESSION:   No evidence for metastatic disease or other acute abnormality in the chest,  abdomen, or pelvis. CT of chest/abdomen/pelvis (10/9/19)  Chest: No thoracic lymphadenopathy.  Specifically, the right retrocrural and left  supraclavicular lymph node seen on prior PET/CT are no longer present. A few  subcentimeter foci of subpleural atelectasis are noted in the lower lobes. The  lungs are otherwise clear. The central airways are patent. No pneumothorax or  pleural effusion. The heart size is normal. A left subclavian ported catheter  terminates in the superior SVC. Trace fluid in the mid esophagus (3-18) suggests  gastroesophageal reflux disease. The T4-T5 disc space is fused anteriorly  (359-27).    Abdomen: No abdominal lymphadenopathy. The stomach, duodenum, liver,  gallbladder, pancreas, spleen, adrenals, and kidneys are normal.     Cervical carcinoma: The primary tumor is not visible. Pelvic malgorzata metastases  have continued to decrease in size. The largest, a conglomerate mass of multiple  nodes posterior to the right external iliac vessels, now measures 23 mm in short  axis and 23 x 30 x 45 mm in 3 dimensions, versus 23 x 34 x 49 mm on 8/12/2019  and 42 x 53 x 75 mm on 5/21/2019. Rim calcification around the treated  metastasis has increased from 8/12/2019. A left external iliac malgorzata metastasis  has also decreased, and measures 17 mm in short axis today, versus 21 mm on  8/12/2019. Bilateral, iliac, and aortocaval malgorzata metastases have also decreased  from 8/12/2019 and are no longer pathologically enlarged. Peritoneal  carcinomatosis, visible as left lower quadrant omental nodules on PET/CT, is no  longer visible. No overt peritoneal or omental nodularity. No ascites.     Pelvis: The cecum is mobile in the right upper quadrant, a normal variant. The  small bowel, ileocecal junction, appendix, colon, and bladder are normal. No  free air.     IMPRESSION:  1. Further decrease in pelvic and retroperitoneal malgorzata metastases. 2. No visible, residual, peritoneal carcinomatosis. 3. No metastases in the chest. Retrocrural and supraclavicular malgorzata metastases  are no longer visible.       IMPRESSION/PLAN:  Danika Smith is a 48 y.o. 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 We previously discussed the pathology and I reviewed the PET images with her and her . I explained that she has metastatic disease and she is not a candidate for surgical resection or curative radiation therapy. I discussed with them that the standard of care would be systemic chemotherapy, consisting of Taxol/Cisplatin/Avastin. She was counseled on the expected side effects and potential toxicities of the regimen. Her questions were answered and she wished to proceed. If she has a good response, we may reconsider pelvic radiation for local control. It may also be necessary for control of bleeding, if that becomes a significant issue. She is currently receiving Taxol/Cisplatin/Avastin chemotherapy. She has now completed 6 cycles. She has tolerated well. Her scan continues to look better, but she still has measurable disease in the pelvic nodes. Her cervix is about half the size it was when we started. Her supraclavicular node has resolved. She does have a few paraaortic nodes that are still measurable. I don't think surgery is a good choice for her, but I think radiation therapy might be helpful, but not now. I think we should continue with chemotherapy for at least 2 more cycles. We will repeat imaging after that and assess if she is a candidate for radiation.         Signed By: Cat Alcantara MD     10/10/2019/9:50 AM

## 2019-10-15 ENCOUNTER — TELEPHONE (OUTPATIENT)
Dept: GYNECOLOGY | Age: 54
End: 2019-10-15

## 2019-10-15 LAB
ALBUMIN SERPL-MCNC: 4.2 G/DL (ref 3.5–5.5)
ALBUMIN/GLOB SERPL: 1.5 {RATIO} (ref 1.2–2.2)
ALP SERPL-CCNC: 69 IU/L (ref 39–117)
ALT SERPL-CCNC: 18 IU/L (ref 0–32)
APPEARANCE UR: CLEAR
AST SERPL-CCNC: 26 IU/L (ref 0–40)
BACTERIA #/AREA URNS HPF: ABNORMAL /[HPF]
BASOPHILS # BLD AUTO: 0 X10E3/UL (ref 0–0.2)
BASOPHILS NFR BLD AUTO: 0 %
BILIRUB SERPL-MCNC: <0.2 MG/DL (ref 0–1.2)
BILIRUB UR QL STRIP: NEGATIVE
BUN SERPL-MCNC: 12 MG/DL (ref 6–24)
BUN/CREAT SERPL: 20 (ref 9–23)
CALCIUM SERPL-MCNC: 9.3 MG/DL (ref 8.7–10.2)
CANCER AG125 SERPL-ACNC: 23.5 U/ML (ref 0–38.1)
CASTS URNS QL MICRO: ABNORMAL /LPF
CHLORIDE SERPL-SCNC: 98 MMOL/L (ref 96–106)
CO2 SERPL-SCNC: 24 MMOL/L (ref 20–29)
COLOR UR: YELLOW
CREAT SERPL-MCNC: 0.6 MG/DL (ref 0.57–1)
EOSINOPHIL # BLD AUTO: 0.1 X10E3/UL (ref 0–0.4)
EOSINOPHIL NFR BLD AUTO: 2 %
EPI CELLS #/AREA URNS HPF: ABNORMAL /HPF (ref 0–10)
ERYTHROCYTE [DISTWIDTH] IN BLOOD BY AUTOMATED COUNT: 15.7 % (ref 12.3–15.4)
GLOBULIN SER CALC-MCNC: 2.8 G/DL (ref 1.5–4.5)
GLUCOSE SERPL-MCNC: 80 MG/DL (ref 65–99)
GLUCOSE UR QL: NEGATIVE
HCT VFR BLD AUTO: 33.7 % (ref 34–46.6)
HGB BLD-MCNC: 10.9 G/DL (ref 11.1–15.9)
HGB UR QL STRIP: NEGATIVE
IMM GRANULOCYTES # BLD AUTO: 0 X10E3/UL (ref 0–0.1)
IMM GRANULOCYTES NFR BLD AUTO: 0 %
KETONES UR QL STRIP: NEGATIVE
LEUKOCYTE ESTERASE UR QL STRIP: ABNORMAL
LYMPHOCYTES # BLD AUTO: 1 X10E3/UL (ref 0.7–3.1)
LYMPHOCYTES NFR BLD AUTO: 20 %
MAGNESIUM SERPL-MCNC: 1.9 MG/DL (ref 1.6–2.3)
MCH RBC QN AUTO: 30.2 PG (ref 26.6–33)
MCHC RBC AUTO-ENTMCNC: 32.3 G/DL (ref 31.5–35.7)
MCV RBC AUTO: 93 FL (ref 79–97)
MICRO URNS: ABNORMAL
MONOCYTES # BLD AUTO: 0.8 X10E3/UL (ref 0.1–0.9)
MONOCYTES NFR BLD AUTO: 15 %
NEUTROPHILS # BLD AUTO: 3.1 X10E3/UL (ref 1.4–7)
NEUTROPHILS NFR BLD AUTO: 63 %
NITRITE UR QL STRIP: NEGATIVE
PH UR STRIP: 8.5 [PH] (ref 5–7.5)
PLATELET # BLD AUTO: 210 X10E3/UL (ref 150–450)
POTASSIUM SERPL-SCNC: 4.8 MMOL/L (ref 3.5–5.2)
PROT SERPL-MCNC: 7 G/DL (ref 6–8.5)
PROT UR QL STRIP: NEGATIVE
RBC # BLD AUTO: 3.61 X10E6/UL (ref 3.77–5.28)
RBC #/AREA URNS HPF: ABNORMAL /HPF (ref 0–2)
SODIUM SERPL-SCNC: 137 MMOL/L (ref 134–144)
SP GR UR: 1.01 (ref 1–1.03)
UROBILINOGEN UR STRIP-MCNC: 0.2 MG/DL (ref 0.2–1)
WBC # BLD AUTO: 5 X10E3/UL (ref 3.4–10.8)
WBC #/AREA URNS HPF: ABNORMAL /HPF (ref 0–5)

## 2019-10-16 ENCOUNTER — HOSPITAL ENCOUNTER (OUTPATIENT)
Dept: INFUSION THERAPY | Age: 54
Discharge: HOME | End: 2019-10-16
Payer: COMMERCIAL

## 2019-10-16 ENCOUNTER — APPOINTMENT (OUTPATIENT)
Dept: INFUSION THERAPY | Age: 54
End: 2019-10-16
Payer: COMMERCIAL

## 2019-10-16 VITALS
WEIGHT: 174.2 LBS | HEIGHT: 62 IN | RESPIRATION RATE: 18 BRPM | SYSTOLIC BLOOD PRESSURE: 137 MMHG | TEMPERATURE: 97.3 F | HEART RATE: 64 BPM | BODY MASS INDEX: 32.06 KG/M2 | DIASTOLIC BLOOD PRESSURE: 87 MMHG

## 2019-10-16 DIAGNOSIS — T45.1X5A CHEMOTHERAPY INDUCED NEUTROPENIA (HCC): Primary | ICD-10-CM

## 2019-10-16 DIAGNOSIS — D70.1 CHEMOTHERAPY INDUCED NEUTROPENIA (HCC): Primary | ICD-10-CM

## 2019-10-16 DIAGNOSIS — C53.8 MALIGNANT NEOPLASM OF OVERLAPPING SITES OF CERVIX (HCC): ICD-10-CM

## 2019-10-16 PROCEDURE — 77030012965 HC NDL HUBR BBMI -A

## 2019-10-16 PROCEDURE — 74011000258 HC RX REV CODE- 258: Performed by: OBSTETRICS & GYNECOLOGY

## 2019-10-16 PROCEDURE — 96367 TX/PROPH/DG ADDL SEQ IV INF: CPT

## 2019-10-16 PROCEDURE — 96415 CHEMO IV INFUSION ADDL HR: CPT

## 2019-10-16 PROCEDURE — 74011250636 HC RX REV CODE- 250/636: Performed by: OBSTETRICS & GYNECOLOGY

## 2019-10-16 PROCEDURE — 96375 TX/PRO/DX INJ NEW DRUG ADDON: CPT

## 2019-10-16 PROCEDURE — 96361 HYDRATE IV INFUSION ADD-ON: CPT

## 2019-10-16 PROCEDURE — 74011000250 HC RX REV CODE- 250: Performed by: OBSTETRICS & GYNECOLOGY

## 2019-10-16 PROCEDURE — 74011250636 HC RX REV CODE- 250/636

## 2019-10-16 PROCEDURE — 96413 CHEMO IV INFUSION 1 HR: CPT

## 2019-10-16 PROCEDURE — 96417 CHEMO IV INFUS EACH ADDL SEQ: CPT

## 2019-10-16 PROCEDURE — 74011250637 HC RX REV CODE- 250/637: Performed by: OBSTETRICS & GYNECOLOGY

## 2019-10-16 RX ORDER — SODIUM CHLORIDE 9 MG/ML
25 INJECTION, SOLUTION INTRAVENOUS CONTINUOUS
Status: DISPENSED | OUTPATIENT
Start: 2019-10-16 | End: 2019-10-16

## 2019-10-16 RX ORDER — DIPHENHYDRAMINE HYDROCHLORIDE 50 MG/ML
50 INJECTION, SOLUTION INTRAMUSCULAR; INTRAVENOUS ONCE
Status: DISCONTINUED | OUTPATIENT
Start: 2019-10-16 | End: 2019-10-16

## 2019-10-16 RX ORDER — SODIUM CHLORIDE 0.9 % (FLUSH) 0.9 %
10 SYRINGE (ML) INJECTION AS NEEDED
Status: ACTIVE | OUTPATIENT
Start: 2019-10-16 | End: 2019-10-16

## 2019-10-16 RX ORDER — HEPARIN 100 UNIT/ML
300-500 SYRINGE INTRAVENOUS AS NEEDED
Status: ACTIVE | OUTPATIENT
Start: 2019-10-16 | End: 2019-10-16

## 2019-10-16 RX ORDER — SODIUM CHLORIDE 9 MG/ML
10 INJECTION INTRAMUSCULAR; INTRAVENOUS; SUBCUTANEOUS AS NEEDED
Status: ACTIVE | OUTPATIENT
Start: 2019-10-16 | End: 2019-10-16

## 2019-10-16 RX ORDER — DIPHENHYDRAMINE HCL 25 MG
50 CAPSULE ORAL ONCE
Status: COMPLETED | OUTPATIENT
Start: 2019-10-16 | End: 2019-10-16

## 2019-10-16 RX ADMIN — SODIUM CHLORIDE 25 ML/HR: 900 INJECTION, SOLUTION INTRAVENOUS at 11:05

## 2019-10-16 RX ADMIN — BEVACIZUMAB 1210 MG: 400 INJECTION, SOLUTION INTRAVENOUS at 12:20

## 2019-10-16 RX ADMIN — FAMOTIDINE 20 MG: 10 INJECTION, SOLUTION INTRAVENOUS at 10:37

## 2019-10-16 RX ADMIN — DEXAMETHASONE SODIUM PHOSPHATE 12 MG: 4 INJECTION, SOLUTION INTRAMUSCULAR; INTRAVENOUS at 11:30

## 2019-10-16 RX ADMIN — CISPLATIN 94 MG: 100 INJECTION, SOLUTION INTRAVENOUS at 16:41

## 2019-10-16 RX ADMIN — SODIUM CHLORIDE 150 MG: 900 INJECTION, SOLUTION INTRAVENOUS at 11:05

## 2019-10-16 RX ADMIN — PACLITAXEL 329 MG: 6 INJECTION, SOLUTION INTRAVENOUS at 13:27

## 2019-10-16 RX ADMIN — POTASSIUM CHLORIDE: 2 INJECTION, SOLUTION, CONCENTRATE INTRAVENOUS at 16:33

## 2019-10-16 RX ADMIN — POTASSIUM CHLORIDE: 2 INJECTION, SOLUTION, CONCENTRATE INTRAVENOUS at 10:00

## 2019-10-16 RX ADMIN — DIPHENHYDRAMINE HYDROCHLORIDE 50 MG: 25 CAPSULE ORAL at 10:35

## 2019-10-16 NOTE — PROGRESS NOTES
[facility] Progress Note    Date: 2019    Name: Charlie Hurley    MRN: [id number]         : 1965    0900. Ms. Freddy Villela Arrived ambulatory and in no distress for cycle 7, day 1 (Avastin/Taxol/Cisplatin). Assessment was completed, no acute issues at this time, no new complaints voiced. Port accessed with positive blood return. Labs drawn 10/14, results reviewed. Chemotherapy Flowsheet 10/16/2019   Cycle C7D1   Date 10/16/2019   Drug / Regimen Avastin/taxol/cisplatin   Pre Hydration given   Post Hydration given   Pre Meds given   Notes given         Ms. Dela Cruz's vitals were reviewed. Patient Vitals for the past 12 hrs:   Temp Pulse Resp BP   10/16/19 0901 97 °F (36.1 °C) 71 18 126/88       Pre-medications  were administered as ordered and chemotherapy was initiated.   Medications Administered     0.9% sodium chloride 1,000 mL with potassium chloride 10 mEq, magnesium sulfate 2 g infusion     Admin Date  10/16/2019 Action  Given Dose   Rate  1,000 mL/hr Route  IntraVENous Administered By  Ronney Aase, RN           Admin Date  10/16/2019 Action  Given Dose   Rate  1,000 mL/hr Route  IntraVENous Administered By  Ronney Aase, RN          0.9% sodium chloride infusion     Admin Date  10/16/2019 Action  New Bag Dose  25 mL/hr Rate  25 mL/hr Route  IntraVENous Administered By  Ronney Aase, RN          bevacizumab (AVASTIN) 1,210 mg in 0.9% sodium chloride 100 mL, overfill volume 10 mL IVPB     Admin Date  10/16/2019 Action  New Bag Dose  1210 mg Rate  316.8 mL/hr Route  IntraVENous Administered By  Ronney Aase, RN          CISplatin (PLATINOL) 94 mg in 0.9% sodium chloride 500 mL, overfill volume 50 mL chemo infusion     Admin Date  10/16/2019 Action  New Bag Dose  94 mg Rate  322 mL/hr Route  IntraVENous Administered By  Ronney Aase, RN          dexamethasone (DECADRON) 12 mg in 0.9% sodium chloride 50 mL, overfill volume 5 mL IVPB     Admin Date  10/16/2019 Action  Given Dose  12 mg Rate  232 mL/hr Route  IntraVENous Administered By  Ben Chaudhry, CLARITA          diphenhydrAMINE (BENADRYL) capsule 50 mg     Admin Date  10/16/2019 Action  Given Dose  50 mg Route  Oral Administered By  Ben Chaudhry, CLARITA          famotidine (PF) (PEPCID) 20 mg in sodium chloride 0.9% 10 mL injection     Admin Date  10/16/2019 Action  Given Dose  20 mg Route  IntraVENous Administered By  Ben Chaudhry, CLARITA          fosaprepitant (EMEND) 150 mg in 0.9% sodium chloride 150 mL IVPB     Admin Date  10/16/2019 Action  Given Dose  150 mg Rate  450 mL/hr Route  IntraVENous Administered By  Ben Chaudhry, CLARITA          PACLitaxel (TAXOL) 329 mg in 0.9% sodium chloride 250 mL, overfill volume 25 mL chemo infusion     Admin Date  10/16/2019 Action  New Bag Dose  329 mg Rate  109.9 mL/hr Route  IntraVENous Administered By  Ben Chaudhry, CLARITA                1910. Ms. Claudia Long tolerated treatment well. Port maintained positive blood return throughout treatment. Port flushed, heparinized and de accessed per protocol. Patient was discharged from [place] [address] in stable condition. Patient is aware of appointment tomorrow evening for neulasta injection.      Future Appointments   Date Time Provider Juan Manuel Tello   11/5/2019  9:45 AM Haven Christensen MD [facility]   11/6/2019  8:00 AM [facility] Vincent Barber RN  October 16, 2019

## 2019-10-17 ENCOUNTER — HOSPITAL ENCOUNTER (OUTPATIENT)
Dept: INFUSION THERAPY | Age: 54
Discharge: HOME | End: 2019-10-17
Payer: COMMERCIAL

## 2019-10-17 VITALS
DIASTOLIC BLOOD PRESSURE: 78 MMHG | TEMPERATURE: 97.2 F | RESPIRATION RATE: 18 BRPM | SYSTOLIC BLOOD PRESSURE: 138 MMHG | HEART RATE: 61 BPM

## 2019-10-17 DIAGNOSIS — C53.8 MALIGNANT NEOPLASM OF OVERLAPPING SITES OF CERVIX (HCC): ICD-10-CM

## 2019-10-17 DIAGNOSIS — D70.1 CHEMOTHERAPY INDUCED NEUTROPENIA (HCC): Primary | ICD-10-CM

## 2019-10-17 DIAGNOSIS — T45.1X5A CHEMOTHERAPY INDUCED NEUTROPENIA (HCC): Primary | ICD-10-CM

## 2019-10-17 PROCEDURE — 74011250636 HC RX REV CODE- 250/636: Performed by: OBSTETRICS & GYNECOLOGY

## 2019-10-17 PROCEDURE — 96372 THER/PROPH/DIAG INJ SC/IM: CPT

## 2019-10-17 RX ADMIN — PEGFILGRASTIM-CBQV 6 MG: 6 INJECTION, SOLUTION SUBCUTANEOUS at 19:00

## 2019-10-17 NOTE — PROGRESS NOTES
[facility] Short Note                       Date: 2019    Name: Norman Singer    MRN: [id number]         : 1965      1855  Pt admit to [facility] for [facility] ambulatory in stable condition. Assessment completed. No new concerns voiced. Patient complains of nausea, and back discomfort. Ms. Leon Hollingsworth vitals were reviewed prior to treatment. Patient Vitals for the past 12 hrs:   Temp Pulse Resp BP   10/17/19 1859 97.2 °F (36.2 °C) 61 18 138/78       Medications given: right arm SQ  Medications Administered     pegfilgrastim-cbqv (UDENYCA) injection 6 mg     Admin Date  10/17/2019 Action  Given Dose  6 mg Route  SubCUTAneous Administered By  Debra Jones RN              4211. Ms. Nicole Hobbs tolerated the injection, and had no complaints. Ms. Nicole Hobbs was discharged from [place] [address] in stable condition.       Future Appointments   Date Time Provider [facility]   2019  9:45 AM Lalo Mcguire MD [address] [facility]   2019  8:00 AM [facility] NURSE [facility]   2019  6:00 PM [facility] INFUSION NURSE [address] CLARITA Barber  2019  7:07 PM

## 2019-10-21 RX ORDER — ACETAMINOPHEN 325 MG/1
650 TABLET ORAL AS NEEDED
Status: CANCELLED
Start: 2019-11-06

## 2019-10-21 RX ORDER — SODIUM CHLORIDE 9 MG/ML
25 INJECTION, SOLUTION INTRAVENOUS CONTINUOUS
Status: CANCELLED | OUTPATIENT
Start: 2019-11-06

## 2019-10-21 RX ORDER — HEPARIN 100 UNIT/ML
300-500 SYRINGE INTRAVENOUS AS NEEDED
Status: CANCELLED | OUTPATIENT
Start: 2019-11-06

## 2019-10-21 RX ORDER — DIPHENHYDRAMINE HYDROCHLORIDE 50 MG/ML
50 INJECTION, SOLUTION INTRAMUSCULAR; INTRAVENOUS ONCE
Status: CANCELLED | OUTPATIENT
Start: 2019-11-06

## 2019-10-21 RX ORDER — ONDANSETRON 2 MG/ML
8 INJECTION INTRAMUSCULAR; INTRAVENOUS AS NEEDED
Status: CANCELLED | OUTPATIENT
Start: 2019-11-06

## 2019-10-21 RX ORDER — HYDROCORTISONE SODIUM SUCCINATE 100 MG/2ML
100 INJECTION, POWDER, FOR SOLUTION INTRAMUSCULAR; INTRAVENOUS AS NEEDED
Status: CANCELLED | OUTPATIENT
Start: 2019-11-06

## 2019-10-21 RX ORDER — DIPHENHYDRAMINE HYDROCHLORIDE 50 MG/ML
50 INJECTION, SOLUTION INTRAMUSCULAR; INTRAVENOUS AS NEEDED
Status: CANCELLED
Start: 2019-11-06

## 2019-10-21 RX ORDER — SODIUM CHLORIDE 9 MG/ML
10 INJECTION INTRAMUSCULAR; INTRAVENOUS; SUBCUTANEOUS AS NEEDED
Status: CANCELLED | OUTPATIENT
Start: 2019-11-06

## 2019-10-21 RX ORDER — SODIUM CHLORIDE 0.9 % (FLUSH) 0.9 %
10 SYRINGE (ML) INJECTION AS NEEDED
Status: CANCELLED | OUTPATIENT
Start: 2019-11-06

## 2019-10-21 RX ORDER — EPINEPHRINE 1 MG/ML
0.3 INJECTION, SOLUTION, CONCENTRATE INTRAVENOUS AS NEEDED
Status: CANCELLED | OUTPATIENT
Start: 2019-11-06

## 2019-10-21 RX ORDER — ALBUTEROL SULFATE 0.83 MG/ML
2.5 SOLUTION RESPIRATORY (INHALATION) AS NEEDED
Status: CANCELLED
Start: 2019-11-06

## 2019-10-30 DIAGNOSIS — C53.8 MALIGNANT NEOPLASM OF OVERLAPPING SITES OF CERVIX (HCC): Primary | ICD-10-CM

## 2019-10-30 NOTE — PROGRESS NOTES
Pre chemo, stat labs today and for c8 taxol/cisplatin/avastin/neulasta on 11/6. Patient c/o congestion, chest pain and wheezing. Patient states the breathing has improved but still has congestion.

## 2019-11-05 ENCOUNTER — OFFICE VISIT (OUTPATIENT)
Dept: GYNECOLOGY | Age: 54
End: 2019-11-05

## 2019-11-05 VITALS
BODY MASS INDEX: 32.06 KG/M2 | DIASTOLIC BLOOD PRESSURE: 87 MMHG | WEIGHT: 174.2 LBS | HEIGHT: 62 IN | SYSTOLIC BLOOD PRESSURE: 114 MMHG | HEART RATE: 81 BPM

## 2019-11-05 DIAGNOSIS — C53.8 MALIGNANT NEOPLASM OF OVERLAPPING SITES OF CERVIX (HCC): Primary | ICD-10-CM

## 2019-11-05 DIAGNOSIS — Z79.899 ON ANTINEOPLASTIC CHEMOTHERAPY: ICD-10-CM

## 2019-11-05 DIAGNOSIS — C77.9 METASTATIC SQUAMOUS CELL CARCINOMA TO LYMPH NODE (HCC): ICD-10-CM

## 2019-11-05 NOTE — PROGRESS NOTES
[address] [facility] [address], [address]  P [phone]  F [phone]    Office Note  Patient ID:  Name:  Christie Duran  MRN:  [id number]  :  1965/54 y.o. Date:  2019      HISTORY OF PRESENT ILLNESS:  Christie Duran is a 48 y.o.  perimenopausal female who is being seen for at least sever cervical dysplasia. She is referred by Dr. Violet Penaloza. Her most recent pap smear was read as HGSIL and she was high-risk HPV positive. She underwent subsequent colposcopy with biopsy. This revealed ZBIGNIEW 2-3, but an invasive process could not be excluded. On a pelvic ultrasound she was noted to have a complex 4.1 cm right adnexal mass and a 3.7 cm complex mass in the cervix. I have been asked to see her in consultation for further evaluation and management. I recommended an exam under anesthesia with cervical biopsy, cystoscopy, proctoscopy. This was performed on 19. Operative findings:  Replacement of cervix with carcinoma, with extension into proximal anterior vagina.  Parametrial thickening bilaterally.  Uterus still somewhat mobile.  No appreciable disease in the bladder or rectum. FINAL PATHOLOGIC DIAGNOSIS   Cervix, biopsy:   Invasive squamous cell carcinoma   Comment   The case is also seen by Dr. Scarlett Desouza who concurs with the findings. The results are communicated with Tamie Frey at approximately 1:30pm on 2019. Following the procedure and confirmation of the diagnosis of cancer, I sent her for a PET/CT. This demonstrated stage IVB disease. I explained that she has metastatic disease and she is not a candidate for surgical resection or curative radiation therapy. I discussed with them that the standard of care would be systemic chemotherapy, consisting of Taxol/Cisplatin/Avastin. If she has a good response, we may reconsider pelvic radiation for local control.   It may also be necessary for control of bleeding, if that becomes a significant issue. She is currently receiving Taxol/Cisplatin/Avastin chemotherapy. She has completed 7 cycles so far. She has tolerated well. Her appetite is good and she has minimal nausea. Her pain is better and she reports that the bleeding has stopped. ROS:   and GI review:  Negative  Cardiopulmonary review:  Negative   Musculoskeletal:  Negative    A comprehensive review of systems was negative except for that written in the History of Present Illness. , 10 point ROS      OB/GYN ROS:  There is no history of significant gyn problems or procedures.       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 ([address] 2019    Hydroureter, right 2019    Chemotherapy induced neutropenia ([address] 2019    Malignant neoplasm of overlapping sites of cervix [facility] 2019    Complex cyst of right ovary 2019     PMH:  Past Medical History:   Diagnosis Date    Cancer ([address] 2019    CEVICAL    Rheumatoid arthritis ([facility]       PSH:  Past Surgical History:   Procedure Laterality Date    HX  SECTION  1997    HX COLPOSCOPY  2019    HGSIL      Social History:  Social History     Tobacco Use    Smoking status: Former Smoker     Packs/day: 0.50     Years: 2.00     Pack years: 1.00     Last attempt to quit: 1991     Years since quittin.4    Smokeless tobacco: Never Used   Substance Use Topics    Alcohol use: Yes     Comment: OCCASIONALLY      Family History:  Family History   Problem Relation Age of Onset    No Known Problems Mother     Other Father         ACCIDENTAL DEATH    Thyroid Disease Brother     Anesth Problems Neg Hx       Medications: (reviewed)  Current Outpatient Medications   Medication Sig    SANCUSO 3.1 mg/24 hour ptwk transdermal patch     senna-docusate (PERICOLACE) 8.6-50 mg per tablet Take 1 Tab by mouth daily. Stop if loose stools.  prochlorperazine (COMPAZINE) 10 mg tablet Take 1 Tab by mouth every six (6) hours as needed for Nausea. Indications: Nausea and Vomiting    ondansetron (ZOFRAN ODT) 4 mg disintegrating tablet Take 1 Tab by mouth every six (6) hours as needed for Nausea. Indications: Nausea and Vomiting caused by Cancer Drugs    lidocaine-prilocaine (EMLA) topical cream Apply small amount over port area one hour before chemo treatment and cover with a Band-Aid    dexamethasone (DECADRON) 4 mg tablet Take 2 tablets by mouth with breakfast the day before chemo also take 2 tablets with breakfast for 2 days after chemotherapy    OTHER \"MANY VITAMINS AND SUPPLEMENTS\"    naproxen (NAPROSYN) 500 mg tablet TAKE 1 TABLET BY MOUTH EVERY 8 HOURS FOR 48 HOURS. TAKING MED AS NEEDED    ibuprofen (ADVIL) 200 mg tablet Take 400 mg by mouth every eight (8) hours as needed for Pain. No current facility-administered medications for this visit. Allergies: (reviewed)  No Known Allergies       OBJECTIVE:    Physical Exam:  VITAL SIGNS: Vitals:    11/05/19 1016   BP: 114/87   Pulse: 81   Weight: 174 lb 3.2 oz (79 kg)   Height: 5' 2.01\" (1.575 m)     Body mass index is 31.85 kg/m². GENERAL FAUSTINA: Conversant, alert, oriented. No acute distress. HEENT: HEENT. No thyroid enlargement. No JVD. Neck: Supple without restrictions. RESPIRATORY: Clear to auscultation and percussion to the bases. No CVAT. CARDIOVASC: RRR without murmur/rub. GASTROINT: soft, non-tender, without masses or organomegaly   MUSCULOSKEL: no joint tenderness, deformity or swelling   EXTREMITIES: extremities normal, atraumatic, no cyanosis or edema   PELVIC: Normal external genitalia. Normal distal vagina. Cervix flush with vaginal apex. No visible tumor, but remained firm and indurated on exam.  No appreciable involvement of the upper vaginal wall, as was previously noted. No appreciable parametrial thickening.   She was not tender on exam.   RECTAL: Deferred   KEIRY SURVEY: No suspicious lymphadenopathy or edema noted. NEURO: Grossly intact. No acute deficit. Lab Data:    Lab Results   Component Value Date/Time    WBC 5.0 10/14/2019 04:49 PM    HGB 10.9 (L) 10/14/2019 04:49 PM    HCT 33.7 (L) 10/14/2019 04:49 PM    PLATELET 754 21/19/2315 04:49 PM    MCV 93 10/14/2019 04:49 PM     Lab Results   Component Value Date/Time    Sodium 137 10/14/2019 04:49 PM    Potassium 4.8 10/14/2019 04:49 PM    Chloride 98 10/14/2019 04:49 PM    CO2 24 10/14/2019 04:49 PM    Anion gap 4 (L) 07/03/2019 08:40 AM    Glucose 80 10/14/2019 04:49 PM    BUN 12 10/14/2019 04:49 PM    Creatinine 0.60 10/14/2019 04:49 PM    BUN/Creatinine ratio 20 10/14/2019 04:49 PM    GFR est  10/14/2019 04:49 PM    GFR est non- 10/14/2019 04:49 PM    Calcium 9.3 10/14/2019 04:49 PM         Imaging: Outside pelvic ultrasound reviewed. Pertinent findings noted in HPI.         PET/CT (5/21/19)  HEAD/NECK: There is 1.8 cm left level 4 lymph node with increased metabolic  activity of 11.     CHEST: No foci of abnormal hypermetabolism. Low-grade activity in a normal right  axillary lymph node is most likely physiologic.     ABDOMEN/PELVIS: There is a 1 cm retrocrural lymph node on the right with  increased metabolic activity of 5.      There is periaortic adenopathy measuring 2 cm with increased metabolic activity  of 9.     There is an enlarged lymph node at the aortic bifurcation with increased  metabolic activity.       There is a complex appearing mass right pelvic sidewall measuring 3.6 cm with  increased metabolic activity of 63.0.   There is left iliac adenopathy with increased metabolic activity of 5.7.     There is a 1.4 cm soft tissue nodule intraperitoneal left lower quadrant with  increased metabolic activity of 7.     There is a mass in the cervix with increased metabolic activity of 12  There is right hydronephrosis.     SKELETON: No foci of abnormal hypermetabolism in the axial and visualized  appendicular skeleton.     IMPRESSION:   There is increased metabolic activity in a cervical mass consistent with  known primary neoplasm. There is adenopathy involving right pelvic sidewall, left iliac chain,  para-aortic chain, intra-abdominal nodule left lower quadrant, retrocrural lymph  node and left supra clavicular lymph node with increased metabolic activity  consistent with metastatic disease. The cervical mass appears to be obstructing  the right ureter resulting in moderate right hydroureteronephrosis. CT of chest/abdomen/pelvis (8/13/19)  CT chest:    With a left chest Port-A-Cath terminates in the SVC. The visualized thyroid  gland is unremarkable. The aorta and main pulmonary artery are normal in  caliber. The heart size is normal.  There is no pericardial or pleural effusion.        There are no enlarged axillary, mediastinal, or hilar lymph nodes.      There is no lung mass or airspace opacity. There is no pneumothorax. The  central airways are clear.     CT abdomen and pelvis: The liver, spleen, pancreas, and adrenal glands are normal. The gall bladder is  present  without intra- or extra-hepatic biliary dilatation.       The kidneys are symmetric without hydronephrosis.      There are no dilated bowel loops. The appendix is normal.       There are no enlarged lymph nodes. There is no free fluid or free air. The  aorta tapers without aneurysm.     The urinary bladder is normal.  There is no pelvic mass. Dominant bilateral  ovarian follicles are noted.     There is degenerative change at L5-S1. There is no aggressive bony lesion.     IMPRESSION:   No evidence for metastatic disease or other acute abnormality in the chest,  abdomen, or pelvis. CT of chest/abdomen/pelvis (10/9/19)  Chest: No thoracic lymphadenopathy.  Specifically, the right retrocrural and left  supraclavicular lymph node seen on prior PET/CT are no longer present. A few  subcentimeter foci of subpleural atelectasis are noted in the lower lobes. The  lungs are otherwise clear. The central airways are patent. No pneumothorax or  pleural effusion. The heart size is normal. A left subclavian ported catheter  terminates in the superior SVC. Trace fluid in the mid esophagus (3-18) suggests  gastroesophageal reflux disease. The T4-T5 disc space is fused anteriorly  (919-27).    Abdomen: No abdominal lymphadenopathy. The stomach, duodenum, liver,  gallbladder, pancreas, spleen, adrenals, and kidneys are normal.     Cervical carcinoma: The primary tumor is not visible. Pelvic malgorzata metastases  have continued to decrease in size. The largest, a conglomerate mass of multiple  nodes posterior to the right external iliac vessels, now measures 23 mm in short  axis and 23 x 30 x 45 mm in 3 dimensions, versus 23 x 34 x 49 mm on 8/12/2019  and 42 x 53 x 75 mm on 5/21/2019. Rim calcification around the treated  metastasis has increased from 8/12/2019. A left external iliac malgorzata metastasis  has also decreased, and measures 17 mm in short axis today, versus 21 mm on  8/12/2019. Bilateral, iliac, and aortocaval malgorzata metastases have also decreased  from 8/12/2019 and are no longer pathologically enlarged. Peritoneal  carcinomatosis, visible as left lower quadrant omental nodules on PET/CT, is no  longer visible. No overt peritoneal or omental nodularity. No ascites.     Pelvis: The cecum is mobile in the right upper quadrant, a normal variant. The  small bowel, ileocecal junction, appendix, colon, and bladder are normal. No  free air.     IMPRESSION:  1. Further decrease in pelvic and retroperitoneal malgorzata metastases. 2. No visible, residual, peritoneal carcinomatosis. 3. No metastases in the chest. Retrocrural and supraclavicular malgorzata metastases  are no longer visible.       IMPRESSION/PLAN:  Gail White is a 48 y.o. 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 We previously discussed the pathology and I reviewed the PET images with her and her . I explained that she has metastatic disease and she is not a candidate for surgical resection or curative radiation therapy. I discussed with them that the standard of care would be systemic chemotherapy, consisting of Taxol/Cisplatin/Avastin. She was counseled on the expected side effects and potential toxicities of the regimen. Her questions were answered and she wished to proceed. If she has a good response, we may reconsider pelvic radiation for local control. It may also be necessary for control of bleeding, if that becomes a significant issue. She is currently receiving Taxol/Cisplatin/Avastin chemotherapy. She has now completed 7 cycles. She has tolerated well. Her scan after the sixth cycle continued to look better, but she still had measurable disease in the pelvic nodes. Her cervix was about half the size it was when we started. Her supraclavicular node had resolved. She did have a few paraaortic nodes that were still measurable. I didn't think surgery was a good choice for her, but I think radiation therapy might be helpful at some point. I felt we should continue with chemotherapy for at least 2 more cycles. We will repeat imaging after one more cycle to assess if she is possibly a candidate for radiation.         Signed By: Gisel Adhikari MD     11/5/2019/9:50 AM

## 2019-11-06 ENCOUNTER — HOSPITAL ENCOUNTER (OUTPATIENT)
Dept: INFUSION THERAPY | Age: 54
Discharge: HOME | End: 2019-11-06
Payer: COMMERCIAL

## 2019-11-06 VITALS
SYSTOLIC BLOOD PRESSURE: 136 MMHG | BODY MASS INDEX: 31.91 KG/M2 | HEIGHT: 62 IN | HEART RATE: 97 BPM | TEMPERATURE: 97.9 F | DIASTOLIC BLOOD PRESSURE: 80 MMHG | RESPIRATION RATE: 18 BRPM | WEIGHT: 173.4 LBS

## 2019-11-06 DIAGNOSIS — T45.1X5A CHEMOTHERAPY INDUCED NEUTROPENIA (HCC): Primary | ICD-10-CM

## 2019-11-06 DIAGNOSIS — C53.8 MALIGNANT NEOPLASM OF OVERLAPPING SITES OF CERVIX (HCC): ICD-10-CM

## 2019-11-06 DIAGNOSIS — D70.1 CHEMOTHERAPY INDUCED NEUTROPENIA (HCC): Primary | ICD-10-CM

## 2019-11-06 LAB
ALBUMIN SERPL-MCNC: 4.1 G/DL (ref 3.5–5.5)
ALBUMIN/GLOB SERPL: 1.4 {RATIO} (ref 1.2–2.2)
ALP SERPL-CCNC: 86 IU/L (ref 39–117)
ALT SERPL-CCNC: 18 IU/L (ref 0–32)
APPEARANCE UR: CLEAR
AST SERPL-CCNC: 23 IU/L (ref 0–40)
BACTERIA #/AREA URNS HPF: NORMAL /[HPF]
BASOPHILS # BLD AUTO: 0 X10E3/UL (ref 0–0.2)
BASOPHILS NFR BLD AUTO: 0 %
BILIRUB SERPL-MCNC: <0.2 MG/DL (ref 0–1.2)
BILIRUB UR QL STRIP: NEGATIVE
BUN SERPL-MCNC: 14 MG/DL (ref 6–24)
BUN/CREAT SERPL: 24 (ref 9–23)
CALCIUM SERPL-MCNC: 9.4 MG/DL (ref 8.7–10.2)
CANCER AG125 SERPL-ACNC: 22.6 U/ML (ref 0–38.1)
CASTS URNS QL MICRO: NORMAL /LPF
CHLORIDE SERPL-SCNC: 101 MMOL/L (ref 96–106)
CO2 SERPL-SCNC: 23 MMOL/L (ref 20–29)
COLOR UR: YELLOW
CREAT SERPL-MCNC: 0.58 MG/DL (ref 0.57–1)
EOSINOPHIL # BLD AUTO: 0.1 X10E3/UL (ref 0–0.4)
EOSINOPHIL NFR BLD AUTO: 1 %
EPI CELLS #/AREA URNS HPF: NORMAL /HPF (ref 0–10)
ERYTHROCYTE [DISTWIDTH] IN BLOOD BY AUTOMATED COUNT: 14.9 % (ref 12.3–15.4)
GLOBULIN SER CALC-MCNC: 2.9 G/DL (ref 1.5–4.5)
GLUCOSE SERPL-MCNC: 69 MG/DL (ref 65–99)
GLUCOSE UR QL: NEGATIVE
HCT VFR BLD AUTO: 33.9 % (ref 34–46.6)
HGB BLD-MCNC: 11.5 G/DL (ref 11.1–15.9)
HGB UR QL STRIP: NEGATIVE
IMM GRANULOCYTES # BLD AUTO: 0 X10E3/UL (ref 0–0.1)
IMM GRANULOCYTES NFR BLD AUTO: 0 %
KETONES UR QL STRIP: NEGATIVE
LEUKOCYTE ESTERASE UR QL STRIP: ABNORMAL
LYMPHOCYTES # BLD AUTO: 0.7 X10E3/UL (ref 0.7–3.1)
LYMPHOCYTES NFR BLD AUTO: 9 %
MAGNESIUM SERPL-MCNC: 1.8 MG/DL (ref 1.6–2.3)
MCH RBC QN AUTO: 31.5 PG (ref 26.6–33)
MCHC RBC AUTO-ENTMCNC: 33.9 G/DL (ref 31.5–35.7)
MCV RBC AUTO: 93 FL (ref 79–97)
MICRO URNS: ABNORMAL
MONOCYTES # BLD AUTO: 0.6 X10E3/UL (ref 0.1–0.9)
MONOCYTES NFR BLD AUTO: 8 %
NEUTROPHILS # BLD AUTO: 6.2 X10E3/UL (ref 1.4–7)
NEUTROPHILS NFR BLD AUTO: 82 %
NITRITE UR QL STRIP: NEGATIVE
PH UR STRIP: 6.5 [PH] (ref 5–7.5)
PLATELET # BLD AUTO: 166 X10E3/UL (ref 150–450)
POTASSIUM SERPL-SCNC: 4.9 MMOL/L (ref 3.5–5.2)
PROT SERPL-MCNC: 7 G/DL (ref 6–8.5)
PROT UR QL STRIP: NEGATIVE
RBC # BLD AUTO: 3.65 X10E6/UL (ref 3.77–5.28)
RBC #/AREA URNS HPF: NORMAL /HPF (ref 0–2)
SODIUM SERPL-SCNC: 137 MMOL/L (ref 134–144)
SP GR UR: 1.02 (ref 1–1.03)
UROBILINOGEN UR STRIP-MCNC: 0.2 MG/DL (ref 0.2–1)
WBC # BLD AUTO: 7.6 X10E3/UL (ref 3.4–10.8)
WBC #/AREA URNS HPF: NORMAL /HPF (ref 0–5)

## 2019-11-06 PROCEDURE — 74011000258 HC RX REV CODE- 258: Performed by: PHYSICIAN ASSISTANT

## 2019-11-06 PROCEDURE — 74011250637 HC RX REV CODE- 250/637: Performed by: PHYSICIAN ASSISTANT

## 2019-11-06 PROCEDURE — 96417 CHEMO IV INFUS EACH ADDL SEQ: CPT

## 2019-11-06 PROCEDURE — 96367 TX/PROPH/DG ADDL SEQ IV INF: CPT

## 2019-11-06 PROCEDURE — 77030012965 HC NDL HUBR BBMI -A

## 2019-11-06 PROCEDURE — 74011250636 HC RX REV CODE- 250/636: Performed by: PHYSICIAN ASSISTANT

## 2019-11-06 PROCEDURE — 96361 HYDRATE IV INFUSION ADD-ON: CPT

## 2019-11-06 PROCEDURE — 96375 TX/PRO/DX INJ NEW DRUG ADDON: CPT

## 2019-11-06 PROCEDURE — 96413 CHEMO IV INFUSION 1 HR: CPT

## 2019-11-06 PROCEDURE — 96415 CHEMO IV INFUSION ADDL HR: CPT

## 2019-11-06 PROCEDURE — 74011000250 HC RX REV CODE- 250: Performed by: PHYSICIAN ASSISTANT

## 2019-11-06 RX ORDER — SODIUM CHLORIDE 0.9 % (FLUSH) 0.9 %
10 SYRINGE (ML) INJECTION AS NEEDED
Status: ACTIVE | OUTPATIENT
Start: 2019-11-06 | End: 2019-11-06

## 2019-11-06 RX ORDER — CALCIUM CARBONATE 200(500)MG
400 TABLET,CHEWABLE ORAL ONCE
Status: DISCONTINUED | OUTPATIENT
Start: 2019-11-06 | End: 2019-11-06

## 2019-11-06 RX ORDER — HEPARIN 100 UNIT/ML
300-500 SYRINGE INTRAVENOUS AS NEEDED
Status: ACTIVE | OUTPATIENT
Start: 2019-11-06 | End: 2019-11-06

## 2019-11-06 RX ORDER — PROCHLORPERAZINE EDISYLATE 5 MG/ML
10 INJECTION INTRAMUSCULAR; INTRAVENOUS
Status: DISCONTINUED | OUTPATIENT
Start: 2019-11-06 | End: 2019-11-07 | Stop reason: HOSPADM

## 2019-11-06 RX ORDER — DIPHENHYDRAMINE HYDROCHLORIDE 50 MG/ML
50 INJECTION, SOLUTION INTRAMUSCULAR; INTRAVENOUS ONCE
Status: DISCONTINUED | OUTPATIENT
Start: 2019-11-06 | End: 2019-11-06

## 2019-11-06 RX ORDER — DEXAMETHASONE SODIUM PHOSPHATE 4 MG/ML
8 INJECTION, SOLUTION INTRA-ARTICULAR; INTRALESIONAL; INTRAMUSCULAR; INTRAVENOUS; SOFT TISSUE ONCE
Status: COMPLETED | OUTPATIENT
Start: 2019-11-06 | End: 2019-11-06

## 2019-11-06 RX ORDER — SODIUM CHLORIDE 9 MG/ML
10 INJECTION INTRAMUSCULAR; INTRAVENOUS; SUBCUTANEOUS AS NEEDED
Status: ACTIVE | OUTPATIENT
Start: 2019-11-06 | End: 2019-11-06

## 2019-11-06 RX ORDER — DIPHENHYDRAMINE HCL 25 MG
50 CAPSULE ORAL ONCE
Status: COMPLETED | OUTPATIENT
Start: 2019-11-06 | End: 2019-11-06

## 2019-11-06 RX ORDER — CALCIUM CARBONATE 200(500)MG
200 TABLET,CHEWABLE ORAL
Status: DISCONTINUED | OUTPATIENT
Start: 2019-11-06 | End: 2019-11-06

## 2019-11-06 RX ORDER — SODIUM CHLORIDE 9 MG/ML
25 INJECTION, SOLUTION INTRAVENOUS CONTINUOUS
Status: DISPENSED | OUTPATIENT
Start: 2019-11-06 | End: 2019-11-06

## 2019-11-06 RX ORDER — FAMOTIDINE 10 MG/ML
20 INJECTION INTRAVENOUS EVERY 12 HOURS
Status: DISCONTINUED | OUTPATIENT
Start: 2019-11-06 | End: 2019-11-06 | Stop reason: SDUPTHER

## 2019-11-06 RX ADMIN — SODIUM CHLORIDE 150 MG: 900 INJECTION, SOLUTION INTRAVENOUS at 10:56

## 2019-11-06 RX ADMIN — FAMOTIDINE 20 MG: 10 INJECTION, SOLUTION INTRAVENOUS at 10:37

## 2019-11-06 RX ADMIN — DEXAMETHASONE SODIUM PHOSPHATE 12 MG: 4 INJECTION, SOLUTION INTRA-ARTICULAR; INTRALESIONAL; INTRAMUSCULAR; INTRAVENOUS; SOFT TISSUE at 10:40

## 2019-11-06 RX ADMIN — Medication 10 ML: at 19:11

## 2019-11-06 RX ADMIN — DIPHENHYDRAMINE HYDROCHLORIDE 50 MG: 25 CAPSULE ORAL at 10:36

## 2019-11-06 RX ADMIN — DEXAMETHASONE SODIUM PHOSPHATE 8 MG: 4 INJECTION, SOLUTION INTRAMUSCULAR; INTRAVENOUS at 16:40

## 2019-11-06 RX ADMIN — POTASSIUM CHLORIDE: 2 INJECTION, SOLUTION, CONCENTRATE INTRAVENOUS at 09:35

## 2019-11-06 RX ADMIN — CISPLATIN 94 MG: 100 INJECTION, SOLUTION INTRAVENOUS at 15:10

## 2019-11-06 RX ADMIN — Medication 500 UNITS: at 19:12

## 2019-11-06 RX ADMIN — SODIUM CHLORIDE 10 ML: 9 INJECTION INTRAMUSCULAR; INTRAVENOUS; SUBCUTANEOUS at 08:50

## 2019-11-06 RX ADMIN — PACLITAXEL 329 MG: 6 INJECTION, SOLUTION INTRAVENOUS at 12:05

## 2019-11-06 RX ADMIN — SODIUM CHLORIDE 25 ML/HR: 900 INJECTION, SOLUTION INTRAVENOUS at 09:30

## 2019-11-06 RX ADMIN — FAMOTIDINE 20 MG: 10 INJECTION, SOLUTION INTRAVENOUS at 16:00

## 2019-11-06 RX ADMIN — POTASSIUM CHLORIDE: 2 INJECTION, SOLUTION, CONCENTRATE INTRAVENOUS at 12:05

## 2019-11-06 RX ADMIN — BEVACIZUMAB 1210 MG: 400 INJECTION, SOLUTION INTRAVENOUS at 11:20

## 2019-11-06 NOTE — PROGRESS NOTES
[facility] GYNECOLOGIC ONCOLOGY  [address], [address], [address]  P [phone]  F [phone]      11/6/2019   Denilson Burch MD: Radha Irwin MD  PCP: Chanel Toro MD     Primary Onc Dx: cervical cancer, stage IVB  Date of Dx: May 2019        HPI:  48 y.o. [place] with a new dx of advanced cervical cancer s/p workup for cervical dysplasia including colpo and EUA on 5/22/19. Biopsies there showed an invasive SCC. Subsequent PET scan revealed metastatic disease. She is recommended protocol with paclitaxel/cis/bevacizumab. OncTx History:  5/11/19 Colpo ZBIGNIEW 2-3, cannot r/o invasive component   5/22/19 Exam under anesthesia, cystoscopy, proctoscopy, cervical biopsy   Cervix, biopsy:  Invasive squamous cell carcinoma    5/21/19 PET/CT  HEAD/NECK: There is 1.8 cm left level 4 lymph node with increased metabolic activity of 11.     CHEST: No foci of abnormal hypermetabolism. Low-grade activity in a normal right  axillary lymph node is most likely physiologic.     ABDOMEN/PELVIS: There is a 1 cm retrocrural lymph node on the right with  increased metabolic activity of 5. There is periaortic adenopathy measuring 2 cm with increased metabolic activity  of 9. There is an enlarged lymph node at the aortic bifurcation with increased  metabolic activity. There is a complex appearing mass right pelvic sidewall measuring 3.6 cm with  increased metabolic activity of 23.3. There is left iliac adenopathy with increased metabolic activity of 5.7. There is a 1.4 cm soft tissue nodule intraperitoneal left lower quadrant with  increased metabolic activity of 7. There is a mass in the cervix with increased metabolic activity of 12  There is right hydronephrosis.     SKELETON: No foci of abnormal hypermetabolism in the axial and visualized appendicular skeleton.     IMPRESSION:   1. There is increased metabolic activity in a cervical mass consistent with known primary neoplasm.    There is adenopathy involving right pelvic sidewall, left iliac chain, para-aortic chain, intra-abdominal nodule left lower quadrant, retrocrural lymph node and left supra clavicular lymph node with increased metabolic activity  consistent with metastatic disease. The cervical mass appears to be obstructing the right ureter resulting in moderate right hydroureteronephrosis. 6/12/19 Initiated Taxol/Cis/Avastin   8/12/19 CT CAP:   No evidence for metastatic disease or other acute abnormality in the chest, abdomen, or pelvis. Cycle: 8     SUBJECTIVE:  Marline Saenz presents for chemotherapy. Feeling cold/congested recently. Noting more numbness R>L toes/plantar surfaces. She has been doing well overall, Still nausea after chemo despite the sancusa patch, but a little better. Using miralax, prunes intermittently. No emesis. Recently more congested, cough. Denies F/C or facial pain. She is active and working full time in . She is eating a diet high in carrots and beets. She performs all ADLs, lives with . She has no acute needs. Not using opioid medication. ROS  Constitutional: no weight loss, fever, night sweats  Respiratory: no cough, shortness of breath, or wheezing  Cardiovascular: no chest pain or dyspnea on exertion  Heme: No abnormal bleeding  Gastrointestinal: +dysphagia. No abdominal pain, change in bowel habits, or black or bloody stools  Genito-Urinary: no dysuria, trouble voiding, or hematuria (though red-tinged urine with beet diet)  Musculoskeletal: back/joint pain 5 days following chemo  Neurological: above  Derm: negative  Psych: negative for depression, though admits feeling depressed state during weeks following chemo.        OBJECTIVE:  Physical Exam  Visit Vitals  /83   Pulse 78   Temp 97.4 °F (36.3 °C)   Resp 18   Ht 5' 2\" (1.575 m)   Wt 173 lb 6.4 oz (78.7 kg)   BMI 31.72 kg/m²        General:  alert, cooperative, no distress       HEENT: without pallor, sclera without jaundice, oral mucosa without lesions,      Cardiac:  Regular rate and rhythm        Lungs:  clear to auscultation bilaterally          Port:  clean, dry, no drainage  Abdomen:  soft, non-tender, without masses or organomegaly       Lymph:  no lymphadenopathy   Extremity: extremities normal, atraumatic, no cyanosis or edema    While here, started with intense reflux, feeling SOB. VSS were normal, though her heart rate does go up, feeling anxious. Stopped cisplatin, flushed line, pepcid, compazine provided. Monitored over 20 minutes, sx resolved. She described nausea, SOB, perhaps some back pain. Wt Readings from Last 3 Encounters:   11/06/19 173 lb 6.4 oz (78.7 kg)   11/05/19 174 lb 3.2 oz (79 kg)   10/16/19 174 lb 3.2 oz (79 kg)       Lab Results   Component Value Date/Time    WBC 7.6 11/05/2019 11:32 AM    ABS. NEUTROPHILS 6.2 11/05/2019 11:32 AM    HGB 11.5 11/05/2019 11:32 AM    HCT 33.9 (L) 11/05/2019 11:32 AM    MCV 93 11/05/2019 11:32 AM    MCH 31.5 11/05/2019 11:32 AM    PLATELET 821 44/33/2332 11:32 AM     Lab Results   Component Value Date/Time    Sodium 137 11/05/2019 11:32 AM    Potassium 4.9 11/05/2019 11:32 AM    Chloride 101 11/05/2019 11:32 AM    CO2 23 11/05/2019 11:32 AM    Glucose 69 11/05/2019 11:32 AM    BUN 14 11/05/2019 11:32 AM    Creatinine 0.58 11/05/2019 11:32 AM    Calcium 9.4 11/05/2019 11:32 AM    Albumin 4.1 11/05/2019 11:32 AM    Bilirubin, total <0.2 11/05/2019 11:32 AM    AST (SGOT) 23 11/05/2019 11:32 AM    ALT (SGPT) 18 11/05/2019 11:32 AM    Alk.  phosphatase 86 11/05/2019 11:32 AM     No results found for: HBA1C, HGBE8, YSJ7SEAK, LIA7HOGA, STQ1RSHH  UA no protein, no blood    Tumor markers  Lab Results   Component Value Date/Time    CA-125 54 (H) 07/03/2019 08:40 AM    Cancer Ag (CA) 125 22.6 11/05/2019 11:32 AM     Cancer Ag (CA) 125   Date Value Ref Range Status   11/05/2019 22.6 0.0 - 38.1 U/mL Final     Comment:     A-STAR Electrochemiluminescence Immunoassay (ECLIA)  Values obtained with different assay methods or kits cannot be  used interchangeably. Results cannot be interpreted as absolute  evidence of the presence or absence of malignant disease. 10/14/2019 23.5 0.0 - 38.1 U/mL Final     Comment:     [employer] Electrochemiluminescence Immunoassay (ECLIA)  Values obtained with different assay methods or kits cannot be  used interchangeably. Results cannot be interpreted as absolute  evidence of the presence or absence of malignant disease. 09/24/2019 26.4 0.0 - 38.1 U/mL Final     Comment:     [employer] Electrochemiluminescence Immunoassay (ECLIA)  Values obtained with different assay methods or kits cannot be  used interchangeably. Results cannot be interpreted as absolute  evidence of the presence or absence of malignant disease. 09/03/2019 29.9 0.0 - 38.1 U/mL Final     Comment:     [employer] Electrochemiluminescence Immunoassay (ECLIA)  Values obtained with different assay methods or kits cannot be  used interchangeably. Results cannot be interpreted as absolute  evidence of the presence or absence of malignant disease. 07/23/2019 51.0 (H) 0.0 - 38.1 U/mL Final     Comment:     [employer] Electrochemiluminescence Immunoassay (ECLIA)  Values obtained with different assay methods or kits cannot be  used interchangeably. Results cannot be interpreted as absolute  evidence of the presence or absence of malignant disease. 07/02/2019 63.4 (H) 0.0 - 38.1 U/mL Final     Comment:     [employer] Electrochemiluminescence Immunoassay (ECLIA)  Values obtained with different assay methods or kits cannot be  used interchangeably. Results cannot be interpreted as absolute  evidence of the presence or absence of malignant disease.          Patient Active Problem List   Diagnosis Code    Complex cyst of right ovary N83.291    Malignant neoplasm of overlapping sites of cervix ([facility] [address] C53.8    Chemotherapy induced neutropenia (HCC) D70.1, T45.1X5A    On antineoplastic chemotherapy Z79.899    Metastatic squamous cell carcinoma to lymph node (HCC) C77.9    Hydroureter, right N13.4     Past Medical History:   Diagnosis Date    Cancer ([facility] [address] 2019    CEVICAL    Rheumatoid arthritis ([facility] [address]      Prior to Admission medications    Medication Sig Start Date End Date Taking? Authorizing Provider   dextromethorphan-guaiFENesin (DELSYM COUGH-CHEST CONGEST DM) 5-100 mg/5 mL liqd Take 5 mL by mouth every six (6) hours as needed for Cough or Other (congestion). 11/6/19  Yes Abdulaziz Hernandez PA   SANCUSO 3.1 mg/24 hour ptwk transdermal patch  9/5/19   Provider, Historical   senna-docusate (PERICOLACE) 8.6-50 mg per tablet Take 1 Tab by mouth daily. Stop if loose stools. 9/4/19   Nabil Hernandez PA   prochlorperazine (COMPAZINE) 10 mg tablet Take 1 Tab by mouth every six (6) hours as needed for Nausea. Indications: Nausea and Vomiting 9/4/19   Abdulaziz Hernandez PA   ondansetron (ZOFRAN ODT) 4 mg disintegrating tablet Take 1 Tab by mouth every six (6) hours as needed for Nausea. Indications: Nausea and Vomiting caused by Cancer Drugs 6/7/19   Sang Gamble MD   lidocaine-prilocaine (EMLA) topical cream Apply small amount over port area one hour before chemo treatment and cover with a Band-Aid 6/7/19   Sang Gamble MD   dexamethasone (DECADRON) 4 mg tablet Take 2 tablets by mouth with breakfast the day before chemo also take 2 tablets with breakfast for 2 days after chemotherapy 6/7/19   Sang Gamble MD   OTHER [facility] VITAMINS AND SUPPLEMENTS\"    Provider, Historical   naproxen (NAPROSYN) 500 mg tablet TAKE 1 TABLET BY MOUTH EVERY 8 HOURS FOR 50 HOURS. TAKING MED AS NEEDED 5/8/19   Provider, Historical   ibuprofen (ADVIL) 200 mg tablet Take 400 mg by mouth every eight (8) hours as needed for Pain.     Provider, Historical     No Known Allergies  Family History   Problem Relation Age of Onset    No Known Problems Mother     Other Father         ACCIDENTAL DEATH    Thyroid Disease Brother     Anesth Problems Neg Hx        IMPRESSION/PLAN:  48 y.o. with a stage IVB SCC of the cervix dx in May 2019. Asx currently. ECO    Patient Active Problem List   Diagnosis Code    Complex cyst of right ovary N83.291    Malignant neoplasm of overlapping sites of cervix ([facility] [address] C53.8    Chemotherapy induced neutropenia (HCC) D70.1, T45.1X5A    On antineoplastic chemotherapy Z79.899    Metastatic squamous cell carcinoma to lymph node (HCC) C77.9    Hydroureter, right N13.4         Chemotherapy Taxol/Cis/Avastin - completed taxol/avastin. Only small amount of cisplatin prior to discomfort. Redose decadron, restart 1/2 rate. If sx continue, will cancel further treatment today. Otherwise response to therapy noted on imaging, overall improvement, noted measurable pelvic malgorzata disease. Imaging planned. Any adjuvant radiation TBD on f/u with Dr. Sarah Gonsalez. Moderate right hydroureternephrosis, improved on CT. Cycstoscopy negative for tumor. Pain improved overall. Myelosuppression: G1 anemia. Neutropenia ppx, GCSF tomorrow. CINV: controlled antiemetics. Compazine sent for add'l coverage. Constipation poss 5HT related. Pericolace - improved. Chronic med issues: RA controlled, no swelling  Psychosocial: well supported,  supportive care at home with diet, interventions and ppx. 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 Questions addressed. Advised to call with any concerns.       JEIMY Franklin  Gyn Onc

## 2019-11-07 ENCOUNTER — HOSPITAL ENCOUNTER (OUTPATIENT)
Dept: INFUSION THERAPY | Age: 54
Discharge: HOME | End: 2019-11-07
Payer: COMMERCIAL

## 2019-11-07 VITALS
DIASTOLIC BLOOD PRESSURE: 83 MMHG | TEMPERATURE: 97.9 F | RESPIRATION RATE: 18 BRPM | HEART RATE: 68 BPM | SYSTOLIC BLOOD PRESSURE: 134 MMHG

## 2019-11-07 DIAGNOSIS — C53.8 MALIGNANT NEOPLASM OF OVERLAPPING SITES OF CERVIX (HCC): ICD-10-CM

## 2019-11-07 DIAGNOSIS — T45.1X5A CHEMOTHERAPY INDUCED NEUTROPENIA (HCC): Primary | ICD-10-CM

## 2019-11-07 DIAGNOSIS — D70.1 CHEMOTHERAPY INDUCED NEUTROPENIA (HCC): Primary | ICD-10-CM

## 2019-11-07 PROCEDURE — 96372 THER/PROPH/DIAG INJ SC/IM: CPT

## 2019-11-07 PROCEDURE — 74011250636 HC RX REV CODE- 250/636: Performed by: PHYSICIAN ASSISTANT

## 2019-11-07 RX ADMIN — PEGFILGRASTIM-CBQV 6 MG: 6 INJECTION, SOLUTION SUBCUTANEOUS at 18:49

## 2019-11-07 NOTE — PROGRESS NOTES
[facility] - Chemotherapy Progress Note    0840  Pt admit to [facility] for Avastin/Taxol/Cisplatin ambulatory in stable condition. Assessment completed. No new concerns voiced. Port accessed with positive blood return, labs drawn yesterday, results on chart.     Chemotherapy Flowsheet 11/6/2019   Cycle C8   Date 11/6/2019   Drug / Regimen avastin/Taxol/Cisplatin   Pre Hydration given   Post Hydration given   Pre Meds given   Notes given         Visit Vitals  /80   Pulse 97   Temp 97.9 °F (36.6 °C)   Resp 18   Ht 5' 2\" (1.575 m)   Wt 78.7 kg (173 lb 6.4 oz)   BMI 31.72 kg/m²       Medications:  Medications Administered     0.9% sodium chloride 1,000 mL with potassium chloride 10 mEq, magnesium sulfate 2 g infusion     Admin Date  11/06/2019 Action  Given Dose   Rate  1,000 mL/hr Route  IntraVENous Administered By  [employer]           Admin Date  11/06/2019 Action  Given Dose   Rate  1,000 mL/hr Route  IntraVENous Administered By  Jeovany Aver A          0.9% sodium chloride infusion     Admin Date  11/06/2019 Action  New Bag Dose  25 mL/hr Rate  25 mL/hr Route  IntraVENous Administered By  Jeovany Aver A          bevacizumab (AVASTIN) 1,210 mg in 0.9% sodium chloride 100 mL, overfill volume 10 mL IVPB     Admin Date  11/06/2019 Action  New Bag Dose  1210 mg Rate  316.8 mL/hr Route  IntraVENous Administered By  Jeovany Aver A          CISplatin (PLATINOL) 94 mg in 0.9% sodium chloride 500 mL, overfill volume 50 mL chemo infusion     Admin Date  11/06/2019 Action  New Bag Dose  94 mg Rate  322 mL/hr Route  IntraVENous Administered By  Jeovany Aver A          dexamethasone (DECADRON) 12 mg in 0.9% sodium chloride 50 mL, overfill volume 5 mL IVPB     Admin Date  11/06/2019 Action  Given Dose  12 mg Rate  232 mL/hr Route  IntraVENous Administered By  Jeovany Aver A          dexamethasone (DECADRON) 4 mg/mL injection 8 mg     Admin Date  11/06/2019 Action  Given Dose  8 mg Route  IntraVENous Administered By  [employer] A          diphenhydrAMINE (BENADRYL) capsule 50 mg     Admin Date  11/06/2019 Action  Given Dose  50 mg Route  Oral Administered By  [employer] A          famotidine (PF) (PEPCID) 20 mg in sodium chloride 0.9% 10 mL injection     Admin Date  11/06/2019 Action  Given Dose  20 mg Route  IntraVENous Administered By  Ceron Gave Date  11/06/2019 Action  Given Dose  20 mg Route  IntraVENous Administered By  [employer] A          fosaprepitant (EMEND) 150 mg in 0.9% sodium chloride 150 mL IVPB     Admin Date  11/06/2019 Action  Given Dose  150 mg Rate  450 mL/hr Route  IntraVENous Administered By  [employer] A          heparin (porcine) pf 300-500 Units     Admin Date  11/06/2019 Action  Given Dose  500 Units Route  InterCATHeter Administered By  [employer] A          PACLitaxel (TAXOL) 329 mg in 0.9% sodium chloride 250 mL, overfill volume 25 mL chemo infusion     Admin Date  11/06/2019 Action  New Bag Dose  329 mg Rate  109.9 mL/hr Route  IntraVENous Administered By  [employer] A          saline peripheral flush soln 10 mL     Admin Date  11/06/2019 Action  Given Dose  10 mL Route  InterCATHeter Administered By  [employer] A          sodium chloride 0.9% injection 10 mL     Admin Date  11/06/2019 Action  Given Dose  10 mL Route  IntraVENous Administered By  [employer] A              During Cisplatin infusion pt c/o congestion and heart burn, infusion paused, PA at bedside, per PA gave additional dose of Pepcid and Decadron and resumed infusion at half the rate. 1915 Pt tolerated treatment well. Port maintained positive blood return throughout treatment. Flushed, heparinized and de-accessed per protocol. D/c home ambulatory in no distress. Pt aware of next appointment scheduled for 11/7/19.

## 2019-11-15 ENCOUNTER — TELEPHONE (OUTPATIENT)
Dept: GYNECOLOGY | Age: 54
End: 2019-11-15

## 2019-11-15 NOTE — TELEPHONE ENCOUNTER
Pt calling to schedule CT scan before her next pre chemo appt on 11/26/19 with Dr. Gabriel Merino. I will forward this message to Page to call pt with appt for scan.

## 2019-11-21 ENCOUNTER — TELEPHONE (OUTPATIENT)
Dept: GYNECOLOGY | Age: 54
End: 2019-11-21

## 2019-11-22 ENCOUNTER — HOSPITAL ENCOUNTER (OUTPATIENT)
Dept: CT IMAGING | Age: 54
Discharge: HOME | End: 2019-11-22
Attending: OBSTETRICS & GYNECOLOGY
Payer: COMMERCIAL

## 2019-11-22 DIAGNOSIS — C53.8 MALIGNANT NEOPLASM OF OVERLAPPING SITES OF CERVIX (HCC): ICD-10-CM

## 2019-11-22 DIAGNOSIS — C77.9 METASTATIC SQUAMOUS CELL CARCINOMA TO LYMPH NODE (HCC): ICD-10-CM

## 2019-11-22 PROCEDURE — 74011636320 HC RX REV CODE- 636/320: Performed by: RADIOLOGY

## 2019-11-22 PROCEDURE — 71260 CT THORAX DX C+: CPT

## 2019-11-22 PROCEDURE — 74177 CT ABD & PELVIS W/CONTRAST: CPT

## 2019-11-22 PROCEDURE — 74011000258 HC RX REV CODE- 258: Performed by: RADIOLOGY

## 2019-11-22 RX ORDER — SODIUM CHLORIDE 0.9 % (FLUSH) 0.9 %
10 SYRINGE (ML) INJECTION
Status: COMPLETED | OUTPATIENT
Start: 2019-11-22 | End: 2019-11-22

## 2019-11-22 RX ADMIN — Medication 10 ML: at 12:05

## 2019-11-22 RX ADMIN — IOHEXOL 50 ML: 240 INJECTION, SOLUTION INTRATHECAL; INTRAVASCULAR; INTRAVENOUS; ORAL at 12:05

## 2019-11-22 RX ADMIN — SODIUM CHLORIDE 100 ML: 900 INJECTION, SOLUTION INTRAVENOUS at 12:05

## 2019-11-22 RX ADMIN — IOPAMIDOL 100 ML: 755 INJECTION, SOLUTION INTRAVENOUS at 12:05

## 2019-11-25 RX ORDER — DIPHENHYDRAMINE HYDROCHLORIDE 50 MG/ML
50 INJECTION, SOLUTION INTRAMUSCULAR; INTRAVENOUS ONCE
Status: CANCELLED | OUTPATIENT
Start: 2019-11-27

## 2019-11-25 RX ORDER — DIPHENHYDRAMINE HYDROCHLORIDE 50 MG/ML
50 INJECTION, SOLUTION INTRAMUSCULAR; INTRAVENOUS AS NEEDED
Status: CANCELLED
Start: 2019-11-27

## 2019-11-25 RX ORDER — ACETAMINOPHEN 325 MG/1
650 TABLET ORAL AS NEEDED
Status: CANCELLED
Start: 2019-11-27

## 2019-11-25 RX ORDER — SODIUM CHLORIDE 9 MG/ML
25 INJECTION, SOLUTION INTRAVENOUS CONTINUOUS
Status: CANCELLED | OUTPATIENT
Start: 2019-11-27

## 2019-11-25 RX ORDER — ONDANSETRON 2 MG/ML
8 INJECTION INTRAMUSCULAR; INTRAVENOUS AS NEEDED
Status: CANCELLED | OUTPATIENT
Start: 2019-11-27

## 2019-11-25 RX ORDER — ALBUTEROL SULFATE 0.83 MG/ML
2.5 SOLUTION RESPIRATORY (INHALATION) AS NEEDED
Status: CANCELLED
Start: 2019-11-27

## 2019-11-25 RX ORDER — HYDROCORTISONE SODIUM SUCCINATE 100 MG/2ML
100 INJECTION, POWDER, FOR SOLUTION INTRAMUSCULAR; INTRAVENOUS AS NEEDED
Status: CANCELLED | OUTPATIENT
Start: 2019-11-27

## 2019-11-25 RX ORDER — HEPARIN 100 UNIT/ML
300-500 SYRINGE INTRAVENOUS AS NEEDED
Status: CANCELLED | OUTPATIENT
Start: 2019-11-27

## 2019-11-25 RX ORDER — SODIUM CHLORIDE 0.9 % (FLUSH) 0.9 %
10 SYRINGE (ML) INJECTION AS NEEDED
Status: CANCELLED | OUTPATIENT
Start: 2019-11-27

## 2019-11-25 RX ORDER — SODIUM CHLORIDE 9 MG/ML
10 INJECTION INTRAMUSCULAR; INTRAVENOUS; SUBCUTANEOUS AS NEEDED
Status: CANCELLED | OUTPATIENT
Start: 2019-11-27

## 2019-11-25 RX ORDER — EPINEPHRINE 1 MG/ML
0.3 INJECTION, SOLUTION, CONCENTRATE INTRAVENOUS AS NEEDED
Status: CANCELLED | OUTPATIENT
Start: 2019-11-27

## 2019-11-26 ENCOUNTER — OFFICE VISIT (OUTPATIENT)
Dept: GYNECOLOGY | Age: 54
End: 2019-11-26

## 2019-11-26 ENCOUNTER — TELEPHONE (OUTPATIENT)
Dept: GYNECOLOGY | Age: 54
End: 2019-11-26

## 2019-11-26 VITALS
HEART RATE: 77 BPM | BODY MASS INDEX: 32.02 KG/M2 | SYSTOLIC BLOOD PRESSURE: 127 MMHG | WEIGHT: 174 LBS | DIASTOLIC BLOOD PRESSURE: 86 MMHG | HEIGHT: 62 IN

## 2019-11-26 DIAGNOSIS — Z79.899 ON ANTINEOPLASTIC CHEMOTHERAPY: ICD-10-CM

## 2019-11-26 DIAGNOSIS — C53.8 MALIGNANT NEOPLASM OF OVERLAPPING SITES OF CERVIX (HCC): Primary | ICD-10-CM

## 2019-11-26 NOTE — PROGRESS NOTES
[facility] [address]  P [phone]  F [phone]    Office Note  Patient ID:  Name:  Ryan Adams  MRN:  [id number]  :  1965/54 y.o. Date:  2019      HISTORY OF PRESENT ILLNESS:  Ryan Adams is a 48 y.o.  perimenopausal female who is being seen for at least sever cervical dysplasia. She is referred by Dr. Pradip Dumont. Her most recent pap smear was read as HGSIL and she was high-risk HPV positive. She underwent subsequent colposcopy with biopsy. This revealed ZBIGNIEW 2-3, but an invasive process could not be excluded. On a pelvic ultrasound she was noted to have a complex 4.1 cm right adnexal mass and a 3.7 cm complex mass in the cervix. I have been asked to see her in consultation for further evaluation and management. I recommended an exam under anesthesia with cervical biopsy, cystoscopy, proctoscopy. This was performed on 19. Operative findings:  Replacement of cervix with carcinoma, with extension into proximal anterior vagina.  Parametrial thickening bilaterally.  Uterus still somewhat mobile.  No appreciable disease in the bladder or rectum. FINAL PATHOLOGIC DIAGNOSIS   Cervix, biopsy:   Invasive squamous cell carcinoma   Comment   The case is also seen by Dr. Amanda Oreilly who concurs with the findings. The results are communicated with Dr. Brendan Rothman nurse, Truong Ashley at approximately 1:30pm on 2019. Following the procedure and confirmation of the diagnosis of cancer, I sent her for a PET/CT. This demonstrated stage IVB disease. I explained that she has metastatic disease and she is not a candidate for surgical resection or curative radiation therapy. I discussed with them that the standard of care would be systemic chemotherapy, consisting of Taxol/Cisplatin/Avastin. If she has a good response, we may reconsider pelvic radiation for local control.   It may also be necessary for control of bleeding, if that becomes a significant issue. She is currently receiving Taxol/Cisplatin/Avastin chemotherapy. She has completed 8 cycles so far. She has tolerated well. Her appetite is good and she has minimal nausea. Her pain is better and she reports that the bleeding has stopped. She presents today to review her newest CT results. ROS:   and GI review:  Negative  Cardiopulmonary review:  Negative   Musculoskeletal:  Negative    A comprehensive review of systems was negative except for that written in the History of Present Illness. , 10 point ROS      OB/GYN ROS:  There is no history of significant gyn problems or procedures.       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 ([facility] [address] 2019    Hydroureter, right 2019    Chemotherapy induced neutropenia ([address] 2019    Malignant neoplasm of overlapping sites of cervix ([facility] [address] 2019    Complex cyst of right ovary 2019     PMH:  Past Medical History:   Diagnosis Date    Cancer ([address] 2019    CEVICAL    Rheumatoid arthritis ([address]       PSH:  Past Surgical History:   Procedure Laterality Date    HX  SECTION  1997    HX COLPOSCOPY  2019    HGSIL      Social History:  Social History     Tobacco Use    Smoking status: Former Smoker     Packs/day: 0.50     Years: 2.00     Pack years: 1.00     Last attempt to quit: 1991     Years since quittin.5    Smokeless tobacco: Never Used   Substance Use Topics    Alcohol use: Yes     Comment: OCCASIONALLY      Family History:  Family History   Problem Relation Age of Onset    No Known Problems Mother     Other Father         ACCIDENTAL DEATH    Thyroid Disease Brother     Anesth Problems Neg Hx       Medications: (reviewed)  Current Outpatient Medications   Medication Sig    dextromethorphan-guaiFENesin (DELSYM COUGH-CHEST CONGEST DM) 5-100 mg/5 mL liqd Take 5 mL by mouth every six (6) hours as needed for Cough or Other (congestion).  SANCUSO 3.1 mg/24 hour ptwk transdermal patch     senna-docusate (PERICOLACE) 8.6-50 mg per tablet Take 1 Tab by mouth daily. Stop if loose stools.  prochlorperazine (COMPAZINE) 10 mg tablet Take 1 Tab by mouth every six (6) hours as needed for Nausea. Indications: Nausea and Vomiting    lidocaine-prilocaine (EMLA) topical cream Apply small amount over port area one hour before chemo treatment and cover with a Band-Aid    dexamethasone (DECADRON) 4 mg tablet Take 2 tablets by mouth with breakfast the day before chemo also take 2 tablets with breakfast for 2 days after chemotherapy    OTHER \"MANY VITAMINS AND SUPPLEMENTS\"    ibuprofen (ADVIL) 200 mg tablet Take 400 mg by mouth every eight (8) hours as needed for Pain.  ondansetron (ZOFRAN ODT) 4 mg disintegrating tablet Take 1 Tab by mouth every six (6) hours as needed for Nausea. Indications: Nausea and Vomiting caused by Cancer Drugs    naproxen (NAPROSYN) 500 mg tablet TAKE 1 TABLET BY MOUTH EVERY 8 HOURS FOR 48 HOURS. TAKING MED AS NEEDED     No current facility-administered medications for this visit. Allergies: (reviewed)  No Known Allergies       OBJECTIVE:    Physical Exam:  VITAL SIGNS: Vitals:    11/26/19 0939 11/26/19 0948   BP: (!) 107/95 127/86   Pulse: 95 77   Weight: 174 lb (78.9 kg)    Height: 5' 2.01\" (1.575 m)      Body mass index is 31.82 kg/m². GENERAL FAUSTINA: Conversant, alert, oriented. No acute distress. HEENT: HEENT. No thyroid enlargement. No JVD. Neck: Supple without restrictions. RESPIRATORY: Clear to auscultation and percussion to the bases. No CVAT. CARDIOVASC: RRR without murmur/rub. GASTROINT: soft, non-tender, without masses or organomegaly   MUSCULOSKEL: no joint tenderness, deformity or swelling   EXTREMITIES: extremities normal, atraumatic, no cyanosis or edema   PELVIC: Normal external genitalia. Normal distal vagina. Cervix flush with vaginal apex. No visible tumor, but remained firm and indurated on exam.  No appreciable involvement of the upper vaginal wall, as was previously noted. No appreciable parametrial thickening. She was not tender on exam.   RECTAL: Deferred   KEIRY SURVEY: No suspicious lymphadenopathy or edema noted. NEURO: Grossly intact. No acute deficit. Lab Data:    Lab Results   Component Value Date/Time    WBC 7.6 11/05/2019 11:32 AM    HGB 11.5 11/05/2019 11:32 AM    HCT 33.9 (L) 11/05/2019 11:32 AM    PLATELET 473 42/92/8573 11:32 AM    MCV 93 11/05/2019 11:32 AM     Lab Results   Component Value Date/Time    Sodium 137 11/05/2019 11:32 AM    Potassium 4.9 11/05/2019 11:32 AM    Chloride 101 11/05/2019 11:32 AM    CO2 23 11/05/2019 11:32 AM    Anion gap 4 (L) 07/03/2019 08:40 AM    Glucose 69 11/05/2019 11:32 AM    BUN 14 11/05/2019 11:32 AM    Creatinine 0.58 11/05/2019 11:32 AM    BUN/Creatinine ratio 24 (H) 11/05/2019 11:32 AM    GFR est  11/05/2019 11:32 AM    GFR est non- 11/05/2019 11:32 AM    Calcium 9.4 11/05/2019 11:32 AM         Imaging: Outside pelvic ultrasound reviewed. Pertinent findings noted in HPI.         PET/CT (5/21/19)  HEAD/NECK: There is 1.8 cm left level 4 lymph node with increased metabolic  activity of 11.     CHEST: No foci of abnormal hypermetabolism. Low-grade activity in a normal right  axillary lymph node is most likely physiologic.     ABDOMEN/PELVIS: There is a 1 cm retrocrural lymph node on the right with  increased metabolic activity of 5.      There is periaortic adenopathy measuring 2 cm with increased metabolic activity  of 9.     There is an enlarged lymph node at the aortic bifurcation with increased  metabolic activity.       There is a complex appearing mass right pelvic sidewall measuring 3.6 cm with  increased metabolic activity of 70.4.   There is left iliac adenopathy with increased metabolic activity of 5.7.     There is a 1.4 cm soft tissue nodule intraperitoneal left lower quadrant with  increased metabolic activity of 7.     There is a mass in the cervix with increased metabolic activity of 12  There is right hydronephrosis.     SKELETON: No foci of abnormal hypermetabolism in the axial and visualized  appendicular skeleton.     IMPRESSION:   There is increased metabolic activity in a cervical mass consistent with  known primary neoplasm. There is adenopathy involving right pelvic sidewall, left iliac chain,  para-aortic chain, intra-abdominal nodule left lower quadrant, retrocrural lymph  node and left supra clavicular lymph node with increased metabolic activity  consistent with metastatic disease. The cervical mass appears to be obstructing  the right ureter resulting in moderate right hydroureteronephrosis. CT of chest/abdomen/pelvis (8/13/19)  CT chest:    With a left chest Port-A-Cath terminates in the SVC. The visualized thyroid  gland is unremarkable. The aorta and main pulmonary artery are normal in  caliber. The heart size is normal.  There is no pericardial or pleural effusion.        There are no enlarged axillary, mediastinal, or hilar lymph nodes.      There is no lung mass or airspace opacity. There is no pneumothorax. The  central airways are clear.     CT abdomen and pelvis: The liver, spleen, pancreas, and adrenal glands are normal. The gall bladder is  present  without intra- or extra-hepatic biliary dilatation.       The kidneys are symmetric without hydronephrosis.      There are no dilated bowel loops. The appendix is normal.       There are no enlarged lymph nodes. There is no free fluid or free air. The  aorta tapers without aneurysm.     The urinary bladder is normal.  There is no pelvic mass. Dominant bilateral  ovarian follicles are noted.     There is degenerative change at L5-S1.  There is no aggressive bony lesion.     IMPRESSION:   No evidence for metastatic disease or other acute abnormality in the chest,  abdomen, or pelvis. CT of chest/abdomen/pelvis (10/9/19)  Chest: No thoracic lymphadenopathy. Specifically, the right retrocrural and left  supraclavicular lymph node seen on prior PET/CT are no longer present. A few  subcentimeter foci of subpleural atelectasis are noted in the lower lobes. The  lungs are otherwise clear. The central airways are patent. No pneumothorax or  pleural effusion. The heart size is normal. A left subclavian ported catheter  terminates in the superior SVC. Trace fluid in the mid esophagus (3-18) suggests  gastroesophageal reflux disease. The T4-T5 disc space is fused anteriorly  (119-40).    Abdomen: No abdominal lymphadenopathy. The stomach, duodenum, liver,  gallbladder, pancreas, spleen, adrenals, and kidneys are normal.     Cervical carcinoma: The primary tumor is not visible. Pelvic malgorzata metastases  have continued to decrease in size. The largest, a conglomerate mass of multiple  nodes posterior to the right external iliac vessels, now measures 23 mm in short  axis and 23 x 30 x 45 mm in 3 dimensions, versus 23 x 34 x 49 mm on 8/12/2019  and 42 x 53 x 75 mm on 5/21/2019. Rim calcification around the treated  metastasis has increased from 8/12/2019. A left external iliac malgorzata metastasis  has also decreased, and measures 17 mm in short axis today, versus 21 mm on  8/12/2019. Bilateral, iliac, and aortocaval malgorzata metastases have also decreased  from 8/12/2019 and are no longer pathologically enlarged. Peritoneal  carcinomatosis, visible as left lower quadrant omental nodules on PET/CT, is no  longer visible. No overt peritoneal or omental nodularity. No ascites.     Pelvis: The cecum is mobile in the right upper quadrant, a normal variant. The  small bowel, ileocecal junction, appendix, colon, and bladder are normal. No  free air.     IMPRESSION:  1. Further decrease in pelvic and retroperitoneal malgorzata metastases.   2. No visible, residual, peritoneal carcinomatosis. 3. No metastases in the chest. Retrocrural and supraclavicular malgorzata metastases  are no longer visible. CT of chest/abdomen/pelvis (11/22/19)  THYROID: No nodule. MEDIASTINUM: Left subclavian port catheter terminates in the superior aspect of  the SVC/junction of the left brachycephalic vein. No mediastinal adenopathy. YNES: No mass or lymphadenopathy. THORACIC AORTA: No dissection or aneurysm. MAIN PULMONARY ARTERY: Normal in caliber. TRACHEA/BRONCHI: Patent. ESOPHAGUS: No wall thickening or dilatation. HEART: Normal in size. PLEURA: No effusion or pneumothorax. LUNGS: No nodule, mass, or airspace disease. LIVER: No mass or biliary dilatation. GALLBLADDER: Unremarkable. SPLEEN: No mass. PANCREAS: No mass or ductal dilatation. ADRENALS: Unremarkable. KIDNEYS: No mass, calculus, or hydronephrosis. STOMACH: Unremarkable. SMALL BOWEL: No dilatation or wall thickening. COLON: No dilatation or wall thickening. APPENDIX: Nonvisualized  PERITONEUM: No ascites or mesenteric lymphadenopathy. No omental nodularity. RETROPERITONEUM: Redemonstration of para-aortic lymph nodes which are prominent  including a left common iliac lymph node measuring 6 mm in short axis and the  right common iliac lymph node measuring 11 mm in short axis (3:88). The right  common iliac lymph node is stable in size. The dominant lymph node in the right  external iliac chain measures 21 mm in short axis, previously 23 mm. The left  external iliac lymph node measures 15 mm in short axis, previously 16 mm. REPRODUCTIVE ORGANS: Uterus and adnexal structures are present and grossly  normal.  URINARY BLADDER: No mass or calculus. BONES: Unchanged millimetric benign-appearing sclerotic focus in the right  scapula. No aggressive osseous lesions. ADDITIONAL COMMENTS: N/A     IMPRESSION:  1.  Stable size of retroperitoneal and pelvic sidewall lymph nodes compared to  October 9, 2019.  While a few of these lymph nodes measure 1 to 2 mm less in  size, this does not reflect a significant change. 2.  No evidence of intrathoracic metastasis. IMPRESSION/PLAN:  Merle Luu is a 48 y.o. 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 We previously discussed the pathology and I reviewed the PET images with her and her . I explained that she has metastatic disease and she is not a candidate for surgical resection or curative radiation therapy. I discussed with them that the standard of care would be systemic chemotherapy, consisting of Taxol/Cisplatin/Avastin. She was counseled on the expected side effects and potential toxicities of the regimen. Her questions were answered and she wished to proceed. If she has a good response, we may reconsider pelvic radiation for local control. It may also be necessary for control of bleeding, if that becomes a significant issue. She is currently receiving Taxol/Cisplatin/Avastin chemotherapy. She has now completed 8 cycles. She has tolerated well. Her scan after the sixth cycle continued to look better, but she still had measurable disease in the pelvic nodes. Her cervix was about half the size it was when we started. Her supraclavicular node had resolved. She did have a few paraaortic nodes that were still measurable. I didn't think surgery was a good choice for her, but I think radiation therapy might be helpful at some point. I felt we should continue with chemotherapy for at least 2 more cycles, to complete 8 in total, before repeat imaging to assess if she is possibly a candidate for radiation. She has no signs of disease outside of the pelvis. I suggested that we stop chemotherapy at this point and concentrate on treating the pelvis. I [place] [facility] [place] with [place] beam therapy there.   She might need brachytherapy as well, or they could opt to treat the cervical area with IMRT instead. I will see her back after the radiation, or sooner if a cervical sleeve is needed.         Signed By: Cat Alcantara MD     11/26/2019/9:50 AM

## 2019-11-27 ENCOUNTER — HOSPITAL ENCOUNTER (OUTPATIENT)
Dept: INFUSION THERAPY | Age: 54
End: 2019-11-27
Payer: COMMERCIAL

## 2019-11-27 LAB
ALBUMIN SERPL-MCNC: 4.2 G/DL (ref 3.5–5.5)
ALBUMIN/GLOB SERPL: 1.4 {RATIO} (ref 1.2–2.2)
ALP SERPL-CCNC: 90 IU/L (ref 39–117)
ALT SERPL-CCNC: 19 IU/L (ref 0–32)
APPEARANCE UR: CLEAR
AST SERPL-CCNC: 23 IU/L (ref 0–40)
BACTERIA #/AREA URNS HPF: ABNORMAL /[HPF]
BASOPHILS # BLD AUTO: 0 X10E3/UL (ref 0–0.2)
BASOPHILS NFR BLD AUTO: 0 %
BILIRUB SERPL-MCNC: ABNORMAL MG/DL (ref 0–1.2)
BILIRUB UR QL STRIP: NEGATIVE
BUN SERPL-MCNC: 13 MG/DL (ref 6–24)
BUN/CREAT SERPL: 23 (ref 9–23)
CALCIUM SERPL-MCNC: 9.2 MG/DL (ref 8.7–10.2)
CANCER AG125 SERPL-ACNC: 23.9 U/ML (ref 0–38.1)
CHLORIDE SERPL-SCNC: 102 MMOL/L (ref 96–106)
CO2 SERPL-SCNC: 23 MMOL/L (ref 20–29)
COLOR UR: YELLOW
CREAT SERPL-MCNC: 0.56 MG/DL (ref 0.57–1)
EOSINOPHIL # BLD AUTO: 0 X10E3/UL (ref 0–0.4)
EOSINOPHIL NFR BLD AUTO: 1 %
EPI CELLS #/AREA URNS HPF: ABNORMAL /HPF (ref 0–10)
ERYTHROCYTE [DISTWIDTH] IN BLOOD BY AUTOMATED COUNT: 15 % (ref 12.3–15.4)
GLOBULIN SER CALC-MCNC: 3 G/DL (ref 1.5–4.5)
GLUCOSE SERPL-MCNC: 91 MG/DL (ref 65–99)
GLUCOSE UR QL: NEGATIVE
HCT VFR BLD AUTO: 32.8 % (ref 34–46.6)
HGB BLD-MCNC: 11.3 G/DL (ref 11.1–15.9)
HGB UR QL STRIP: NEGATIVE
KETONES UR QL STRIP: NEGATIVE
LEUKOCYTE ESTERASE UR QL STRIP: ABNORMAL
LYMPHOCYTES # BLD AUTO: 0.5 X10E3/UL (ref 0.7–3.1)
LYMPHOCYTES NFR BLD AUTO: 14 %
MAGNESIUM SERPL-MCNC: 1.8 MG/DL (ref 1.6–2.3)
MCH RBC QN AUTO: 30.4 PG (ref 26.6–33)
MCHC RBC AUTO-ENTMCNC: 34.5 G/DL (ref 31.5–35.7)
MCV RBC AUTO: 88 FL (ref 79–97)
MICRO URNS: ABNORMAL
MONOCYTES # BLD AUTO: 0.5 X10E3/UL (ref 0.1–0.9)
MONOCYTES NFR BLD AUTO: 13 %
NEUTROPHILS # BLD AUTO: 2.9 X10E3/UL (ref 1.4–7)
NEUTROPHILS NFR BLD AUTO: 72 %
NITRITE UR QL STRIP: NEGATIVE
PH UR STRIP: 7 [PH] (ref 5–7.5)
PLATELET # BLD AUTO: 205 X10E3/UL (ref 150–450)
POTASSIUM SERPL-SCNC: 5.1 MMOL/L (ref 3.5–5.2)
PROT SERPL-MCNC: 7.2 G/DL (ref 6–8.5)
PROT UR QL STRIP: NEGATIVE
RBC # BLD AUTO: 3.72 X10E6/UL (ref 3.77–5.28)
RBC #/AREA URNS HPF: ABNORMAL /HPF (ref 0–2)
SODIUM SERPL-SCNC: 138 MMOL/L (ref 134–144)
SP GR UR: <=1.005 (ref 1–1.03)
UROBILINOGEN UR STRIP-MCNC: 0.2 MG/DL (ref 0.2–1)
WBC # BLD AUTO: 3.9 X10E3/UL (ref 3.4–10.8)
WBC #/AREA URNS HPF: ABNORMAL /HPF (ref 0–5)

## 2019-11-29 ENCOUNTER — HOSPITAL ENCOUNTER (OUTPATIENT)
Dept: INFUSION THERAPY | Age: 54
End: 2019-11-29
Payer: COMMERCIAL

## 2019-12-18 ENCOUNTER — APPOINTMENT (OUTPATIENT)
Dept: INFUSION THERAPY | Age: 54
End: 2019-12-18

## 2019-12-19 ENCOUNTER — APPOINTMENT (OUTPATIENT)
Dept: INFUSION THERAPY | Age: 54
End: 2019-12-19

## 2019-12-31 ENCOUNTER — TELEPHONE (OUTPATIENT)
Dept: GYNECOLOGY | Age: 54
End: 2019-12-31

## 2019-12-31 NOTE — TELEPHONE ENCOUNTER
Pt , she states one year before her cancer diagnosis, she was diagnosed with RA. She was taking methotrexate but stopped it when she began cancer treatment, she states she is having severe bone pain and joint pain that is a 10/10 on pain scale and the pain in her lower legs is making it difficult to walk,  She states she is taking advil which is helping slightly, she has not started her radiation, she will start on 1/7/20 or 1/8/20. Her last chemo treatment was on 11/6/19, Dr. Shirin Bowers states she can restart her methotrexate but patient is reluctant. She will call or see her rheumatologist and if the methotrexate is recommended then she will call her radiation oncologist to ensure it will not affect her radiation.

## 2020-01-06 ENCOUNTER — TELEPHONE (OUTPATIENT)
Dept: GYNECOLOGY | Age: 55
End: 2020-01-06

## 2020-01-06 NOTE — TELEPHONE ENCOUNTER
Pt c/o having a cough with sinus drainage in which she see's a small amount of blood on her pillow in the mornings. Pt will talk with Pharmacist about which Mucinex to take. Also c/o possible arthritis in shoulder, in which she will see her Rheumatologist.  She states she will begin pelvic radiation tomorrow.

## 2020-01-28 ENCOUNTER — TELEPHONE (OUTPATIENT)
Dept: GYNECOLOGY | Age: 55
End: 2020-01-28

## 2020-01-28 NOTE — TELEPHONE ENCOUNTER
[place] calling to find out if patient still using Sancuso patch. Informed them she has completed her chemotherapy and not using the patch at this time.

## 2020-02-07 ENCOUNTER — TELEPHONE (OUTPATIENT)
Dept: GYNECOLOGY | Age: 55
End: 2020-02-07

## 2020-02-07 NOTE — TELEPHONE ENCOUNTER
Pt , she states she needs clarification on her radiation treatments, she states that when she met with Dr. Gualberto Ireland he recommended 5 weeks of radiation, she states she is nearing her 5 th week but the plan has been changed and 2 more weeks have been added, she states that Dr. Lisa Pretty is recommending brachy therapy, she states that when she originally met with Dr. Shefali Nguyen he also recommended brachy therapy but \"changed his mind\". She would like for you to call her at [phone] to discuss and get your recommendations.

## 2020-02-10 ENCOUNTER — TELEPHONE (OUTPATIENT)
Dept: GYNECOLOGY | Age: 55
End: 2020-02-10

## 2020-02-10 NOTE — TELEPHONE ENCOUNTER
I advised pt that per Dr. Lubna Lawton Let her know that I think she should probably get the brachytherapy as well.  Better chance of controlling the disease    She also asked about an MRI she states Dr. Silke Ingram ordered an MRI for next Friday, per Dr. Lubna Lawton, he is ok with her getting MRI, she will also make a 4 week follow up visit with Dr. Lubna Lawton after all of her radiation is completed

## 2020-02-10 NOTE — TELEPHONE ENCOUNTER
I called and LMTCB, per Dr. Flaquita Garcia  Let her know that I think she should probably get the brachytherapy as well.  Better chance of controlling the disease.

## 2020-03-18 PROBLEM — C53.9 CERVICAL CANCER (HCC): Status: ACTIVE | Noted: 2020-03-18

## 2020-03-18 PROBLEM — C53.9 CERVICAL CANCER (HCC): Status: ACTIVE | Noted: 2019-05-01

## 2020-04-27 ENCOUNTER — TELEPHONE (OUTPATIENT)
Dept: GYNECOLOGY | Age: 55
End: 2020-04-27

## 2020-04-27 NOTE — TELEPHONE ENCOUNTER
We cancelled the patient due  being out of the office. The patient states she had a pelvic exam on 4/24/20 with the RAD/ONC and he stated there were no signs of cancer. Please advise when she should re-schedule to see you?

## 2020-05-07 NOTE — TELEPHONE ENCOUNTER
Patient notified to schedule an appointment for next week to follow up and discuss ct scan. The patient is unable to come in next week. We scheduled for 5/19/20.

## 2020-05-19 ENCOUNTER — TELEPHONE (OUTPATIENT)
Dept: GYNECOLOGY | Age: 55
End: 2020-05-19

## 2020-05-19 ENCOUNTER — OFFICE VISIT (OUTPATIENT)
Dept: GYNECOLOGY | Age: 55
End: 2020-05-19

## 2020-05-19 VITALS
HEIGHT: 62 IN | HEART RATE: 70 BPM | WEIGHT: 168 LBS | BODY MASS INDEX: 30.91 KG/M2 | DIASTOLIC BLOOD PRESSURE: 72 MMHG | SYSTOLIC BLOOD PRESSURE: 100 MMHG

## 2020-05-19 DIAGNOSIS — C53.8 MALIGNANT NEOPLASM OF OVERLAPPING SITES OF CERVIX (HCC): Primary | ICD-10-CM

## 2020-05-19 RX ORDER — SODIUM CHLORIDE 9 MG/ML
10 INJECTION INTRAMUSCULAR; INTRAVENOUS; SUBCUTANEOUS AS NEEDED
Status: CANCELLED | OUTPATIENT
Start: 2020-06-04

## 2020-05-19 RX ORDER — HEPARIN 100 UNIT/ML
500 SYRINGE INTRAVENOUS AS NEEDED
Status: CANCELLED | OUTPATIENT
Start: 2020-06-04

## 2020-05-19 RX ORDER — SODIUM CHLORIDE 0.9 % (FLUSH) 0.9 %
5-10 SYRINGE (ML) INJECTION AS NEEDED
Status: CANCELLED | OUTPATIENT
Start: 2020-06-04

## 2020-05-19 NOTE — PROGRESS NOTES
[facility] [address], [address]  P [phone]  F [phone]    Office Note  Patient ID:  Name:  Jennifer Jamil  MRN:  [id number]  :  1965/54 y.o. Date:  2020      HISTORY OF PRESENT ILLNESS:  Jennifer Jamil is a 48 y.o.  perimenopausal female who is being seen for at least sever cervical dysplasia. She is referred by Dr. Clari Navas. Her most recent pap smear was read as HGSIL and she was high-risk HPV positive. She underwent subsequent colposcopy with biopsy. This revealed ZBIGNIEW 2-3, but an invasive process could not be excluded. On a pelvic ultrasound she was noted to have a complex 4.1 cm right adnexal mass and a 3.7 cm complex mass in the cervix. I have been asked to see her in consultation for further evaluation and management. I recommended an exam under anesthesia with cervical biopsy, cystoscopy, proctoscopy. This was performed on 19. Operative findings:  Replacement of cervix with carcinoma, with extension into proximal anterior vagina.  Parametrial thickening bilaterally.  Uterus still somewhat mobile.  No appreciable disease in the bladder or rectum. FINAL PATHOLOGIC DIAGNOSIS   Cervix, biopsy:   Invasive squamous cell carcinoma   Comment   The case is also seen by Dr. Sky Shaffer who concurs with the findings. The results are communicated with Dr. Maki Prom nurse, Milady Logan at approximately 1:30pm on 2019. Following the procedure and confirmation of the diagnosis of cancer, I sent her for a PET/CT. This demonstrated stage IVB disease. I explained that she has metastatic disease and she is not a candidate for surgical resection or curative radiation therapy. I discussed with them that the standard of care would be systemic chemotherapy, consisting of Taxol/Cisplatin/Avastin. If she has a good response, we may reconsider pelvic radiation for local control.   It may also be necessary for control of bleeding, if that becomes a significant issue. She completed 8 cycles of Taxol/Cisplatin/Avastin chemotherapy and tolerated well. She responded very well to treatment. The supraclavicular node had resolved completely and her retroperitoneal adenopathy had markedly improved. Her primary tumor had also shrunk significantly. I recommended pelvic radiation to provide local control. Since she lives in [address] [place] [address] I referred her there for treatment. She subsequently was referred to radiation oncology at [facility] for brachytherapy. She completed all of her therapy in 2020. She has not had any imaging since that time. She presents today for follow-up. She reports some mild flank tenderness on the right, but nothing else. No vaginal bleeding, but some discharge. ROS:   and GI review:  Negative  Cardiopulmonary review:  Negative   Musculoskeletal:  Negative    A comprehensive review of systems was negative except for that written in the History of Present Illness. , 10 point ROS      OB/GYN ROS:  There is no history of significant gyn problems or procedures.       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 ([address] 2019    Hydroureter, right 2019    Chemotherapy induced neutropenia ([address] 2019    Malignant neoplasm of overlapping sites of cervix ([address] 2019    Complex cyst of right ovary 2019    Cervical cancer ([address] 2019     PMH:  Past Medical History:   Diagnosis Date    Cervical cancer ([address] 2019    Rheumatoid arthritis (HCC)       PSH:  Past Surgical History:   Procedure Laterality Date    HX  SECTION  1997    HX COLPOSCOPY  2019    HGSIL      Social History:  Social History     Tobacco Use    Smoking status: Former Smoker     Packs/day: 0.50     Years: 2.00     Pack years: 1.00     Last attempt to quit: 1991     Years since quittin.0    Smokeless tobacco: Never Used   Substance Use Topics    Alcohol use: Yes     Comment: OCCASIONALLY      Family History:  Family History   Problem Relation Age of Onset    No Known Problems Mother     Other Father         ACCIDENTAL DEATH    Thyroid Disease Brother     Anesth Problems Neg Hx       Medications: (reviewed)  Current Outpatient Medications   Medication Sig    lidocaine-prilocaine (EMLA) topical cream Apply small amount over port area one hour before chemo treatment and cover with a Band-Aid    OTHER \"MANY VITAMINS AND SUPPLEMENTS\"    naproxen (NAPROSYN) 500 mg tablet TAKE 1 TABLET BY MOUTH EVERY 8 HOURS FOR 48 HOURS. TAKING MED AS NEEDED    ibuprofen (ADVIL) 200 mg tablet Take 400 mg by mouth every eight (8) hours as needed for Pain.  dextromethorphan-guaiFENesin (DELSYM COUGH-CHEST CONGEST DM) 5-100 mg/5 mL liqd Take 5 mL by mouth every six (6) hours as needed for Cough or Other (congestion).  SANCUSO 3.1 mg/24 hour ptwk transdermal patch     senna-docusate (PERICOLACE) 8.6-50 mg per tablet Take 1 Tab by mouth daily. Stop if loose stools.  prochlorperazine (COMPAZINE) 10 mg tablet Take 1 Tab by mouth every six (6) hours as needed for Nausea. Indications: Nausea and Vomiting    ondansetron (ZOFRAN ODT) 4 mg disintegrating tablet Take 1 Tab by mouth every six (6) hours as needed for Nausea. Indications: Nausea and Vomiting caused by Cancer Drugs    dexamethasone (DECADRON) 4 mg tablet Take 2 tablets by mouth with breakfast the day before chemo also take 2 tablets with breakfast for 2 days after chemotherapy     No current facility-administered medications for this visit. Allergies: (reviewed)  No Known Allergies       OBJECTIVE:    Physical Exam:  VITAL SIGNS: Vitals:    20 1027   BP: 100/72   Pulse: 70   Weight: 168 lb (76.2 kg)   Height: 5' 2.01\" (1.575 m)     Body mass index is 30.72 kg/m². GENERAL FAUSTINA: Conversant, alert, oriented.  No acute distress. HEENT: HEENT. No thyroid enlargement. No JVD. Neck: Supple without restrictions. RESPIRATORY: Clear to auscultation and percussion to the bases. No CVAT. CARDIOVASC: RRR without murmur/rub. GASTROINT: soft, non-tender, without masses or organomegaly   MUSCULOSKEL: no joint tenderness, deformity or swelling   EXTREMITIES: extremities normal, atraumatic, no cyanosis or edema   PELVIC: Normal external genitalia. Normal distal vagina. Cervix flush with vaginal apex. No visible tumor. No firmness or nodularity. No appreciable parametrial thickening. She was not tender on exam.   RECTAL: Deferred   KEIRY SURVEY: No suspicious lymphadenopathy or edema noted. NEURO: Grossly intact. No acute deficit. Lab Data:    Lab Results   Component Value Date/Time    WBC 3.9 11/26/2019 11:10 AM    HGB 11.3 11/26/2019 11:10 AM    HCT 32.8 (L) 11/26/2019 11:10 AM    PLATELET 658 04/05/2398 11:10 AM    MCV 88 11/26/2019 11:10 AM     Lab Results   Component Value Date/Time    Sodium 138 11/26/2019 11:10 AM    Potassium 5.1 11/26/2019 11:10 AM    Chloride 102 11/26/2019 11:10 AM    CO2 23 11/26/2019 11:10 AM    Anion gap 4 (L) 07/03/2019 08:40 AM    Glucose 91 11/26/2019 11:10 AM    BUN 13 11/26/2019 11:10 AM    Creatinine 0.56 (L) 11/26/2019 11:10 AM    BUN/Creatinine ratio 23 11/26/2019 11:10 AM    GFR est  11/26/2019 11:10 AM    GFR est non- 11/26/2019 11:10 AM    Calcium 9.2 11/26/2019 11:10 AM         PET/CT (5/21/19)  HEAD/NECK: There is 1.8 cm left level 4 lymph node with increased metabolic  activity of 11.     CHEST: No foci of abnormal hypermetabolism.  Low-grade activity in a normal right  axillary lymph node is most likely physiologic.     ABDOMEN/PELVIS: There is a 1 cm retrocrural lymph node on the right with  increased metabolic activity of 5.      There is periaortic adenopathy measuring 2 cm with increased metabolic activity  of 9.     There is an enlarged lymph node at the aortic bifurcation with increased  metabolic activity.       There is a complex appearing mass right pelvic sidewall measuring 3.6 cm with  increased metabolic activity of 69.0. There is left iliac adenopathy with increased metabolic activity of 5.7.     There is a 1.4 cm soft tissue nodule intraperitoneal left lower quadrant with  increased metabolic activity of 7.     There is a mass in the cervix with increased metabolic activity of 12  There is right hydronephrosis.     SKELETON: No foci of abnormal hypermetabolism in the axial and visualized  appendicular skeleton.     IMPRESSION:   There is increased metabolic activity in a cervical mass consistent with  known primary neoplasm. There is adenopathy involving right pelvic sidewall, left iliac chain,  para-aortic chain, intra-abdominal nodule left lower quadrant, retrocrural lymph  node and left supra clavicular lymph node with increased metabolic activity  consistent with metastatic disease. The cervical mass appears to be obstructing  the right ureter resulting in moderate right hydroureteronephrosis. CT of chest/abdomen/pelvis (8/13/19)  CT chest:    With a left chest Port-A-Cath terminates in the SVC. The visualized thyroid  gland is unremarkable. The aorta and main pulmonary artery are normal in  caliber. The heart size is normal.  There is no pericardial or pleural effusion.        There are no enlarged axillary, mediastinal, or hilar lymph nodes.      There is no lung mass or airspace opacity. There is no pneumothorax. The  central airways are clear.     CT abdomen and pelvis: The liver, spleen, pancreas, and adrenal glands are normal. The gall bladder is  present  without intra- or extra-hepatic biliary dilatation.       The kidneys are symmetric without hydronephrosis.      There are no dilated bowel loops. The appendix is normal.       There are no enlarged lymph nodes. There is no free fluid or free air.  The  aorta tapers without aneurysm.     The urinary bladder is normal.  There is no pelvic mass. Dominant bilateral  ovarian follicles are noted.     There is degenerative change at L5-S1. There is no aggressive bony lesion.     IMPRESSION:   No evidence for metastatic disease or other acute abnormality in the chest,  abdomen, or pelvis. CT of chest/abdomen/pelvis (10/9/19)  Chest: No thoracic lymphadenopathy. Specifically, the right retrocrural and left  supraclavicular lymph node seen on prior PET/CT are no longer present. A few  subcentimeter foci of subpleural atelectasis are noted in the lower lobes. The  lungs are otherwise clear. The central airways are patent. No pneumothorax or  pleural effusion. The heart size is normal. A left subclavian ported catheter  terminates in the superior SVC. Trace fluid in the mid esophagus (3-18) suggests  gastroesophageal reflux disease. The T4-T5 disc space is fused anteriorly  (372-12).    Abdomen: No abdominal lymphadenopathy. The stomach, duodenum, liver,  gallbladder, pancreas, spleen, adrenals, and kidneys are normal.     Cervical carcinoma: The primary tumor is not visible. Pelvic malgorzata metastases  have continued to decrease in size. The largest, a conglomerate mass of multiple  nodes posterior to the right external iliac vessels, now measures 23 mm in short  axis and 23 x 30 x 45 mm in 3 dimensions, versus 23 x 34 x 49 mm on 8/12/2019  and 42 x 53 x 75 mm on 5/21/2019. Rim calcification around the treated  metastasis has increased from 8/12/2019. A left external iliac malgorzata metastasis  has also decreased, and measures 17 mm in short axis today, versus 21 mm on  8/12/2019. Bilateral, iliac, and aortocaval malgorzata metastases have also decreased  from 8/12/2019 and are no longer pathologically enlarged. Peritoneal  carcinomatosis, visible as left lower quadrant omental nodules on PET/CT, is no  longer visible. No overt peritoneal or omental nodularity. No ascites.     Pelvis:  The cecum is mobile in the right upper quadrant, a normal variant. The  small bowel, ileocecal junction, appendix, colon, and bladder are normal. No  free air.     IMPRESSION:  1. Further decrease in pelvic and retroperitoneal malgorzata metastases. 2. No visible, residual, peritoneal carcinomatosis. 3. No metastases in the chest. Retrocrural and supraclavicular malgorzata metastases  are no longer visible. CT of chest/abdomen/pelvis (11/22/19)  THYROID: No nodule. MEDIASTINUM: Left subclavian port catheter terminates in the superior aspect of  the SVC/junction of the left brachycephalic vein. No mediastinal adenopathy. YNES: No mass or lymphadenopathy. THORACIC AORTA: No dissection or aneurysm. MAIN PULMONARY ARTERY: Normal in caliber. TRACHEA/BRONCHI: Patent. ESOPHAGUS: No wall thickening or dilatation. HEART: Normal in size. PLEURA: No effusion or pneumothorax. LUNGS: No nodule, mass, or airspace disease. LIVER: No mass or biliary dilatation. GALLBLADDER: Unremarkable. SPLEEN: No mass. PANCREAS: No mass or ductal dilatation. ADRENALS: Unremarkable. KIDNEYS: No mass, calculus, or hydronephrosis. STOMACH: Unremarkable. SMALL BOWEL: No dilatation or wall thickening. COLON: No dilatation or wall thickening. APPENDIX: Nonvisualized  PERITONEUM: No ascites or mesenteric lymphadenopathy. No omental nodularity. RETROPERITONEUM: Redemonstration of para-aortic lymph nodes which are prominent  including a left common iliac lymph node measuring 6 mm in short axis and the  right common iliac lymph node measuring 11 mm in short axis (3:88). The right  common iliac lymph node is stable in size. The dominant lymph node in the right  external iliac chain measures 21 mm in short axis, previously 23 mm. The left  external iliac lymph node measures 15 mm in short axis, previously 16 mm. REPRODUCTIVE ORGANS: Uterus and adnexal structures are present and grossly  normal.  URINARY BLADDER: No mass or calculus.   BONES: Unchanged millimetric benign-appearing sclerotic focus in the right  scapula. No aggressive osseous lesions. ADDITIONAL COMMENTS: N/A     IMPRESSION:  1.  Stable size of retroperitoneal and pelvic sidewall lymph nodes compared to  October 9, 2019. While a few of these lymph nodes measure 1 to 2 mm less in  size, this does not reflect a significant change. 2.  No evidence of intrathoracic metastasis. Pelvic MRI (2/27/20) - [facility]  Uterus:  No fibroids. Endometrium is grossly unremarkable. There is a Miah sleeve which terminates in the endometrial canal.     Cervix:  The cervix is small and contains a Nabothian cyst. There is abnormal signal in the patients cervix seen anteriorly and along the right side of the cervix. There is no large cervical mass. The anterior paracervical fat is hazy, it is unclear if this is related to treatment effect. Ovaries: There is a subcentimeter cyst of the right ovary the left ovary is mildly full but appears morphologically similar when compared with the prior exam.    Vagina:  Collapsed. Lymph nodes: There is persistent pathologic bilateral pelvic adenopathy, but it is improving. On the prior exam there was a right external iliac/obturator lymph node which measured at least 3.5 cm short axis and currently measures closer to 2.1 cm. There is a left external iliac chain lymph node which measured at least 2.2 cm short axis and currently measures approximately 1.4 cm short axis. There is no worsening adenopathy. Of note, the field of view this exam does not extend above the aortic bifurcation. Bladder:  Collapsed and trabeculated. IMPRESSION:  1. Probable residual tumor along the right side and anterior portion of the cervix, as described above. Absence of similar prior exam and absence of postcontrast sequences does compromise comparison assessment. Interpretation was made within this caveat. 2.  Improving pelvic adenopathy.   3.  Remainder as above      IMPRESSION/PLAN:  Melody Houston is a 48 y.o. 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 She was treated with 8 cycles of Taxol/Cistplatin/Avastin chemotherapy and had a great response. She was then treated with pelvic radiation therapy, including brachytherapy. She has had an excellent response to her radiation therapy, with no visible disease remaining. I recommend repeat imaging at this point. I am going to try to see if I can get a PET/CT covered, specifically because she had metastatic disease at the time of presentation and she has been off chemotherapy for about 6 months. Hopefully the scan will be negative. I will review the results with her virtually to save her a trip back to [place].         Signed By: Jeaneth Mathews MD     5/19/2020/9:50 AM

## 2020-05-27 ENCOUNTER — TELEPHONE (OUTPATIENT)
Dept: GYNECOLOGY | Age: 55
End: 2020-05-27

## 2020-05-28 ENCOUNTER — HOSPITAL ENCOUNTER (OUTPATIENT)
Dept: PET IMAGING | Age: 55
Discharge: HOME | End: 2020-05-28
Attending: OBSTETRICS & GYNECOLOGY
Payer: COMMERCIAL

## 2020-05-28 ENCOUNTER — HOSPITAL ENCOUNTER (OUTPATIENT)
Dept: INFUSION THERAPY | Age: 55
End: 2020-05-28
Payer: COMMERCIAL

## 2020-05-28 VITALS — BODY MASS INDEX: 29.06 KG/M2 | WEIGHT: 164 LBS | HEIGHT: 63 IN

## 2020-05-28 DIAGNOSIS — C53.8 MALIGNANT NEOPLASM OF OVERLAPPING SITES OF CERVIX (HCC): ICD-10-CM

## 2020-05-28 PROCEDURE — A9552 F18 FDG: HCPCS

## 2020-05-28 RX ORDER — SODIUM CHLORIDE 0.9 % (FLUSH) 0.9 %
10 SYRINGE (ML) INJECTION
Status: COMPLETED | OUTPATIENT
Start: 2020-05-28 | End: 2020-05-28

## 2020-05-28 RX ADMIN — Medication 10 ML: at 10:59

## 2020-06-03 NOTE — PROGRESS NOTES
[address] [place] [address] Mathias Moritz 583, [address] [facility]  P [phone]  F [phone]    Office Note  Patient ID:  Name:  William Mullen  MRN:  [id number]  :  1965/54 y.o. Date:  2020      HISTORY OF PRESENT ILLNESS:  William Mullen is a 48 y.o.  perimenopausal female who is being seen for at least sever cervical dysplasia. She is referred by Dr. Lesly Brennan. Her most recent pap smear was read as HGSIL and she was high-risk HPV positive. She underwent subsequent colposcopy with biopsy. This revealed ZBIGNIEW 2-3, but an invasive process could not be excluded. On a pelvic ultrasound she was noted to have a complex 4.1 cm right adnexal mass and a 3.7 cm complex mass in the cervix. I have been asked to see her in consultation for further evaluation and management. I recommended an exam under anesthesia with cervical biopsy, cystoscopy, proctoscopy. This was performed on 19. Operative findings:  Replacement of cervix with carcinoma, with extension into proximal anterior vagina.  Parametrial thickening bilaterally.  Uterus still somewhat mobile.  No appreciable disease in the bladder or rectum. FINAL PATHOLOGIC DIAGNOSIS   Cervix, biopsy:   Invasive squamous cell carcinoma   Comment   The case is also seen by Dr. Nayeli Maldonado who concurs with the findings. The results are communicated with Dr. Maury Reeves nurse, Marguerite Guajardo at approximately 1:30pm on 2019. Following the procedure and confirmation of the diagnosis of cancer, I sent her for a PET/CT. This demonstrated stage IVB disease. I explained that she has metastatic disease and she is not a candidate for surgical resection or curative radiation therapy. I discussed with them that the standard of care would be systemic chemotherapy, consisting of Taxol/Cisplatin/Avastin. If she has a good response, we may reconsider pelvic radiation for local control.   It may also be necessary for control of bleeding, if that becomes a significant issue. She completed 8 cycles of Taxol/Cisplatin/Avastin chemotherapy and tolerated well. She responded very well to treatment. The supraclavicular node had resolved completely and her retroperitoneal adenopathy had markedly improved. Her primary tumor had also shrunk significantly. I recommended pelvic radiation to provide local control. Since she lives in [facility] I referred her there for treatment. She subsequently was referred to radiation oncology at [facility] for brachytherapy. She completed all of her therapy in March 2020. She had not had any imaging since that time when she presented for follow-up. She reported some mild flank tenderness on the right, but nothing else. No vaginal bleeding, but some discharge. I sent her for a PET/CT to evaluate for persistent disease. There was no hypermetabolic activity remaining in the cervix or the previously identified nodes, however, there were two spots of activity in the left ovary. She presents today to review those results. ROS:   and GI review:  Negative  Cardiopulmonary review:  Negative   Musculoskeletal:  Negative    A comprehensive review of systems was negative except for that written in the History of Present Illness. , 10 point ROS      OB/GYN ROS:  There is no history of significant gyn problems or procedures.       Problem List:  Patient Active Problem List    Diagnosis Date Noted    On antineoplastic chemotherapy 07/03/2019    Metastatic squamous cell carcinoma to lymph node ([address] 07/03/2019    Hydroureter, right 07/03/2019    Chemotherapy induced neutropenia ([address] 06/06/2019    Malignant neoplasm of overlapping sites of cervix ([address] 05/20/2019    Complex cyst of right ovary 05/19/2019    Cervical cancer ([address] 05/2019     PMH:  Past Medical History:   Diagnosis Date    Cervical cancer ([address] 05/2019    Rheumatoid arthritis (HCC)       PSH:  Past Surgical History:   Procedure Laterality Date [address] [facility] COLPOSCOPY  2019    HGSIL      Social History:  Social History     Tobacco Use    Smoking status: Former Smoker     Packs/day: 0.50     Years: 2.00     Pack years: 1.00     Last attempt to quit: 1991     Years since quittin.0    Smokeless tobacco: Never Used   Substance Use Topics    Alcohol use: Yes     Comment: OCCASIONALLY      Family History:  Family History   Problem Relation Age of Onset    No Known Problems Mother     Other Father         ACCIDENTAL DEATH    Thyroid Disease Brother     Anesth Problems Neg Hx       Medications: (reviewed)  Current Outpatient Medications   Medication Sig    senna-docusate (PERICOLACE) 8.6-50 mg per tablet Take 1 Tab by mouth daily. Stop if loose stools.  lidocaine-prilocaine (EMLA) topical cream Apply small amount over port area one hour before chemo treatment and cover with a Band-Aid    OTHER \"MANY VITAMINS AND SUPPLEMENTS\"    naproxen (NAPROSYN) 500 mg tablet TAKE 1 TABLET BY MOUTH EVERY 8 HOURS FOR 48 HOURS. TAKING MED AS NEEDED    ibuprofen (ADVIL) 200 mg tablet Take 400 mg by mouth every eight (8) hours as needed for Pain.  dextromethorphan-guaiFENesin (DELSYM COUGH-CHEST CONGEST DM) 5-100 mg/5 mL liqd Take 5 mL by mouth every six (6) hours as needed for Cough or Other (congestion).  SANCUSO 3.1 mg/24 hour ptwk transdermal patch     prochlorperazine (COMPAZINE) 10 mg tablet Take 1 Tab by mouth every six (6) hours as needed for Nausea. Indications: Nausea and Vomiting    ondansetron (ZOFRAN ODT) 4 mg disintegrating tablet Take 1 Tab by mouth every six (6) hours as needed for Nausea. Indications: Nausea and Vomiting caused by Cancer Drugs    dexamethasone (DECADRON) 4 mg tablet Take 2 tablets by mouth with breakfast the day before chemo also take 2 tablets with breakfast for 2 days after chemotherapy     No current facility-administered medications for this visit.       Facility-Administered Medications Ordered in Other Visits   Medication Dose Route Frequency    sodium chloride (NS) flush 5-10 mL  5-10 mL IntraVENous PRN    0.9% sodium chloride injection 10 mL  10 mL IntraVENous PRN    heparin (porcine) pf 500 Units  500 Units IntraVENous PRN     Allergies: (reviewed)  No Known Allergies       OBJECTIVE:    Physical Exam:  VITAL SIGNS: Vitals:    06/04/20 1324   BP: 121/80   Pulse: 88   Weight: 169 lb 9.6 oz (76.9 kg)   Height: 5' 3\" (1.6 m)     Body mass index is 30.04 kg/m². GENERAL FAUSTINA: Conversant, alert, oriented. No acute distress. HEENT: HEENT. No thyroid enlargement. No JVD. Neck: Supple without restrictions. RESPIRATORY: Clear to auscultation and percussion to the bases. No CVAT. CARDIOVASC: RRR without murmur/rub. GASTROINT: soft, non-tender, without masses or organomegaly   MUSCULOSKEL: no joint tenderness, deformity or swelling   EXTREMITIES: extremities normal, atraumatic, no cyanosis or edema   PELVIC: Normal external genitalia. Normal distal vagina. Cervix flush with vaginal apex. No visible tumor. No firmness or nodularity. No appreciable parametrial thickening. She was not tender on exam.   RECTAL: Deferred   KEIRY SURVEY: No suspicious lymphadenopathy or edema noted. NEURO: Grossly intact. No acute deficit.        Lab Data:    Lab Results   Component Value Date/Time    WBC 3.9 11/26/2019 11:10 AM    HGB 11.3 11/26/2019 11:10 AM    HCT 32.8 (L) 11/26/2019 11:10 AM    PLATELET 084 83/02/4852 11:10 AM    MCV 88 11/26/2019 11:10 AM     Lab Results   Component Value Date/Time    Sodium 138 11/26/2019 11:10 AM    Potassium 5.1 11/26/2019 11:10 AM    Chloride 102 11/26/2019 11:10 AM    CO2 23 11/26/2019 11:10 AM    Anion gap 4 (L) 07/03/2019 08:40 AM    Glucose 91 11/26/2019 11:10 AM    BUN 13 11/26/2019 11:10 AM    Creatinine 0.56 (L) 11/26/2019 11:10 AM    BUN/Creatinine ratio 23 11/26/2019 11:10 AM    GFR est  11/26/2019 11:10 AM    GFR est non- 11/26/2019 11:10 AM    Calcium 9.2 11/26/2019 11:10 AM         PET/CT (5/21/19)  HEAD/NECK: There is 1.8 cm left level 4 lymph node with increased metabolic  activity of 11.     CHEST: No foci of abnormal hypermetabolism. Low-grade activity in a normal right  axillary lymph node is most likely physiologic.     ABDOMEN/PELVIS: There is a 1 cm retrocrural lymph node on the right with  increased metabolic activity of 5.      There is periaortic adenopathy measuring 2 cm with increased metabolic activity  of 9.     There is an enlarged lymph node at the aortic bifurcation with increased  metabolic activity.       There is a complex appearing mass right pelvic sidewall measuring 3.6 cm with  increased metabolic activity of 16.7. There is left iliac adenopathy with increased metabolic activity of 5.7.     There is a 1.4 cm soft tissue nodule intraperitoneal left lower quadrant with  increased metabolic activity of 7.     There is a mass in the cervix with increased metabolic activity of 12  There is right hydronephrosis.     SKELETON: No foci of abnormal hypermetabolism in the axial and visualized  appendicular skeleton.     IMPRESSION:   There is increased metabolic activity in a cervical mass consistent with  known primary neoplasm. There is adenopathy involving right pelvic sidewall, left iliac chain,  para-aortic chain, intra-abdominal nodule left lower quadrant, retrocrural lymph  node and left supra clavicular lymph node with increased metabolic activity  consistent with metastatic disease. The cervical mass appears to be obstructing  the right ureter resulting in moderate right hydroureteronephrosis. CT of chest/abdomen/pelvis (8/13/19)  CT chest:    With a left chest Port-A-Cath terminates in the SVC. The visualized thyroid  gland is unremarkable. The aorta and main pulmonary artery are normal in  caliber.  The heart size is normal.  There is no pericardial or pleural effusion.        There are no enlarged axillary, mediastinal, or hilar lymph nodes.      There is no lung mass or airspace opacity. There is no pneumothorax. The  central airways are clear.     CT abdomen and pelvis: The liver, spleen, pancreas, and adrenal glands are normal. The gall bladder is  present  without intra- or extra-hepatic biliary dilatation.       The kidneys are symmetric without hydronephrosis.      There are no dilated bowel loops. The appendix is normal.       There are no enlarged lymph nodes. There is no free fluid or free air. The  aorta tapers without aneurysm.     The urinary bladder is normal.  There is no pelvic mass. Dominant bilateral  ovarian follicles are noted.     There is degenerative change at L5-S1. There is no aggressive bony lesion.     IMPRESSION:   No evidence for metastatic disease or other acute abnormality in the chest,  abdomen, or pelvis. CT of chest/abdomen/pelvis (10/9/19)  Chest: No thoracic lymphadenopathy. Specifically, the right retrocrural and left  supraclavicular lymph node seen on prior PET/CT are no longer present. A few  subcentimeter foci of subpleural atelectasis are noted in the lower lobes. The  lungs are otherwise clear. The central airways are patent. No pneumothorax or  pleural effusion. The heart size is normal. A left subclavian ported catheter  terminates in the superior SVC. Trace fluid in the mid esophagus (3-18) suggests  gastroesophageal reflux disease. The T4-T5 disc space is fused anteriorly  (184-94).    Abdomen: No abdominal lymphadenopathy. The stomach, duodenum, liver,  gallbladder, pancreas, spleen, adrenals, and kidneys are normal.     Cervical carcinoma: The primary tumor is not visible. Pelvic malgorzata metastases  have continued to decrease in size.  The largest, a conglomerate mass of multiple  nodes posterior to the right external iliac vessels, now measures 23 mm in short  axis and 23 x 30 x 45 mm in 3 dimensions, versus 23 x 34 x 49 mm on 8/12/2019  and 42 x 53 x 75 mm on 5/21/2019. Rim calcification around the treated  metastasis has increased from 8/12/2019. A left external iliac malgorzata metastasis  has also decreased, and measures 17 mm in short axis today, versus 21 mm on  8/12/2019. Bilateral, iliac, and aortocaval malgorzata metastases have also decreased  from 8/12/2019 and are no longer pathologically enlarged. Peritoneal  carcinomatosis, visible as left lower quadrant omental nodules on PET/CT, is no  longer visible. No overt peritoneal or omental nodularity. No ascites.     Pelvis: The cecum is mobile in the right upper quadrant, a normal variant. The  small bowel, ileocecal junction, appendix, colon, and bladder are normal. No  free air.     IMPRESSION:  1. Further decrease in pelvic and retroperitoneal malgorzata metastases. 2. No visible, residual, peritoneal carcinomatosis. 3. No metastases in the chest. Retrocrural and supraclavicular malgorzata metastases  are no longer visible. CT of chest/abdomen/pelvis (11/22/19)  THYROID: No nodule. MEDIASTINUM: Left subclavian port catheter terminates in the superior aspect of  the SVC/junction of the left brachycephalic vein. No mediastinal adenopathy. YNES: No mass or lymphadenopathy. THORACIC AORTA: No dissection or aneurysm. MAIN PULMONARY ARTERY: Normal in caliber. TRACHEA/BRONCHI: Patent. ESOPHAGUS: No wall thickening or dilatation. HEART: Normal in size. PLEURA: No effusion or pneumothorax. LUNGS: No nodule, mass, or airspace disease. LIVER: No mass or biliary dilatation. GALLBLADDER: Unremarkable. SPLEEN: No mass. PANCREAS: No mass or ductal dilatation. ADRENALS: Unremarkable. KIDNEYS: No mass, calculus, or hydronephrosis. STOMACH: Unremarkable. SMALL BOWEL: No dilatation or wall thickening. COLON: No dilatation or wall thickening. APPENDIX: Nonvisualized  PERITONEUM: No ascites or mesenteric lymphadenopathy. No omental nodularity.   RETROPERITONEUM: Redemonstration of para-aortic lymph nodes which are prominent  including a left common iliac lymph node measuring 6 mm in short axis and the  right common iliac lymph node measuring 11 mm in short axis (3:88). The right  common iliac lymph node is stable in size. The dominant lymph node in the right  external iliac chain measures 21 mm in short axis, previously 23 mm. The left  external iliac lymph node measures 15 mm in short axis, previously 16 mm. REPRODUCTIVE ORGANS: Uterus and adnexal structures are present and grossly  normal.  URINARY BLADDER: No mass or calculus. BONES: Unchanged millimetric benign-appearing sclerotic focus in the right  scapula. No aggressive osseous lesions. ADDITIONAL COMMENTS: N/A     IMPRESSION:  1.  Stable size of retroperitoneal and pelvic sidewall lymph nodes compared to  October 9, 2019. While a few of these lymph nodes measure 1 to 2 mm less in  size, this does not reflect a significant change. 2.  No evidence of intrathoracic metastasis. Pelvic MRI (2/27/20) - [facility]  Uterus:  No fibroids. Endometrium is grossly unremarkable. There is a Miah sleeve which terminates in the endometrial canal.     Cervix:  The cervix is small and contains a Nabothian cyst. There is abnormal signal in the patients cervix seen anteriorly and along the right side of the cervix. There is no large cervical mass. The anterior paracervical fat is hazy, it is unclear if this is related to treatment effect. Ovaries: There is a subcentimeter cyst of the right ovary the left ovary is mildly full but appears morphologically similar when compared with the prior exam.    Vagina:  Collapsed. Lymph nodes: There is persistent pathologic bilateral pelvic adenopathy, but it is improving. On the prior exam there was a right external iliac/obturator lymph node which measured at least 3.5 cm short axis and currently measures closer to 2.1 cm.  There is a left external iliac chain lymph node which measured at least 2.2 cm short axis and currently measures approximately 1.4 cm short axis. There is no worsening adenopathy. Of note, the field of view this exam does not extend above the aortic bifurcation. Bladder:  Collapsed and trabeculated. IMPRESSION:  1. Probable residual tumor along the right side and anterior portion of the cervix, as described above. Absence of similar prior exam and absence of postcontrast sequences does compromise comparison assessment. Interpretation was made within this caveat. 2.  Improving pelvic adenopathy. 3.  Remainder as above      PET/CT (5/28/20)  HEAD/NECK: No apparent foci of abnormal hypermetabolism. Cerebral evaluation is  limited by normal intense activity. Activity in the left supraclavicular lymph  node has resolved.     CHEST: No foci of abnormal hypermetabolism. Retrocrural adenopathy has resolved. Low-grade activity in the axilla lymph nodes is most likely reactive     ABDOMEN/PELVIS: Retroperitoneal activity has resolved. Pelvic adenopathy has  resolved. There is a calcified right pelvic sidewall lymph node.     There are 2 small foci of increased activity with SUV of 13.9 which which are  within the left ovary     SKELETON: No foci of abnormal hypermetabolism in the axial and visualized  appendicular skeleton.     IMPRESSION:   1. Increased metabolic activity in bilateral pelvic adenopathy, retroperitoneal  adenopathy and left supraclavicular adenopathy has resolved.     There are 2 small foci of increased metabolic activity which correspond to a  normal sized left ovary of uncertain significance and most likely are  physiologic although small metastatic foci are not entirely excluded and  continued close follow-up would be helpful. IMPRESSION/PLAN:  Jaimie Penaloza is a 48 y.o. 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 She was treated with 8 cycles of Taxol/Cistplatin/Avastin chemotherapy and had a great response.   She was then treated with pelvic radiation therapy, including brachytherapy. She has had an excellent response to her radiation therapy, with no obvious disease remaining on exam.  A PET demonstrated resolution of her previously noted disease, however, there were two spots of activity in the left ovary. These could represents physiologic cysts, but malignancy cannot be excluded. I reviewed the films with my partner, Dr. Amy Randall. He and I are both concerned about the lesions in the ovary and agree that a laparoscopic evaluation with BSO is very reasonable. Hopefully we will find that they are benign in nature. It will also give us an opportunity to evaluate the peritoneal cavity, and possibly the retroperitoneum. Depending on the amount of scar tissue and fibrosis, we will try to sample some of the pelvic nodes. There is a large node in the right obturator space, but it does not demonstrate hypermetabolic activity. Dr. Amy Randall and I agreed that a hysterectomy would not be beneficial and would only increase potential complications. I explained this all to the patient and her  and they agree with the plan of laparoscopic evaluation and BSO. She was counseled on the risks, benefits, indications, and alternatives of surgery. Her questions were answered and she wishes to proceed.           Signed By: Lazaro Hamlin MD     6/4/2020/9:50 AM

## 2020-06-04 ENCOUNTER — HOSPITAL ENCOUNTER (OUTPATIENT)
Dept: INFUSION THERAPY | Age: 55
Discharge: HOME | End: 2020-06-04
Payer: COMMERCIAL

## 2020-06-04 ENCOUNTER — OFFICE VISIT (OUTPATIENT)
Dept: GYNECOLOGY | Age: 55
End: 2020-06-04

## 2020-06-04 VITALS
WEIGHT: 169.6 LBS | BODY MASS INDEX: 30.05 KG/M2 | DIASTOLIC BLOOD PRESSURE: 80 MMHG | SYSTOLIC BLOOD PRESSURE: 121 MMHG | HEART RATE: 88 BPM | HEIGHT: 63 IN

## 2020-06-04 VITALS
SYSTOLIC BLOOD PRESSURE: 118 MMHG | HEART RATE: 83 BPM | RESPIRATION RATE: 16 BRPM | DIASTOLIC BLOOD PRESSURE: 75 MMHG | TEMPERATURE: 98 F

## 2020-06-04 DIAGNOSIS — C53.8 MALIGNANT NEOPLASM OF OVERLAPPING SITES OF CERVIX (HCC): Primary | ICD-10-CM

## 2020-06-04 DIAGNOSIS — C77.9 METASTATIC SQUAMOUS CELL CARCINOMA TO LYMPH NODE (HCC): Primary | ICD-10-CM

## 2020-06-04 PROCEDURE — 74011250636 HC RX REV CODE- 250/636: Performed by: OBSTETRICS & GYNECOLOGY

## 2020-06-04 PROCEDURE — 77030012965 HC NDL HUBR BBMI -A

## 2020-06-04 PROCEDURE — 96523 IRRIG DRUG DELIVERY DEVICE: CPT

## 2020-06-04 RX ORDER — SODIUM CHLORIDE 0.9 % (FLUSH) 0.9 %
5-10 SYRINGE (ML) INJECTION AS NEEDED
Status: DISPENSED | OUTPATIENT
Start: 2020-06-04 | End: 2020-06-04

## 2020-06-04 RX ORDER — HEPARIN 100 UNIT/ML
500 SYRINGE INTRAVENOUS AS NEEDED
Status: ACTIVE | OUTPATIENT
Start: 2020-06-04 | End: 2020-06-04

## 2020-06-04 RX ORDER — SODIUM CHLORIDE 9 MG/ML
10 INJECTION INTRAMUSCULAR; INTRAVENOUS; SUBCUTANEOUS AS NEEDED
Status: ACTIVE | OUTPATIENT
Start: 2020-06-04 | End: 2020-06-04

## 2020-06-04 RX ADMIN — SODIUM CHLORIDE 10 ML: 9 INJECTION INTRAMUSCULAR; INTRAVENOUS; SUBCUTANEOUS at 11:59

## 2020-06-04 RX ADMIN — HEPARIN 500 UNITS: 100 SYRINGE at 12:00

## 2020-06-04 RX ADMIN — Medication 10 ML: at 12:00

## 2020-06-04 NOTE — PROGRESS NOTES
OPIC Short Note                       Date: 2020    Name: Molina Bloom    MRN: [id number]         : 1965      1155 Pt admit to [facility] for port flush ambulatory in stable condition. Ms. Charisse Zambrano vitals were reviewed prior to and after treatment. Patient Vitals for the past 12 hrs:   Temp Pulse Resp BP   20 1155 98 °F (36.7 °C) 83 16 118/75     Medications given:   Medications Administered     0.9% sodium chloride injection 10 mL     Admin Date  2020 Action  Given Dose  10 mL Route  IntraVENous Administered By  Lubna Morrell RN          heparin (porcine) pf 500 Units     Admin Date  2020 Action  Given Dose  500 Units Route  IntraVENous Administered By  Lubna Morrell RN          sodium chloride (NS) flush 5-10 mL     Admin Date  2020 Action  Given Dose  10 mL Route  IntraVENous Administered By  Lubna Morrell RN              Port accessed with positive blood return. Port flushed, heparinized and de-accessed per protocol. Ms. Bety Morrell was discharged from [place] [address] in stable condition at 1200.      Future Appointments   Date Time Provider Juan Manuel Tello   6/10/2020 10:30 AM [facility] PSYCHIATRIC [place] PAT EXAM RM [address] [facility]   2020 12:00 PM Hernando Baker MD [address] [facility]   2020 12:00 PM Lila Anderson PA-C [address] [place] [address]   2020 10:30 AM H2 [facility]THEW FASTRACK [facility]   2020 10:30 AM H2 MATTHEW FASTRACK [facility]   2020 10:30 AM H2 MATTHEW FASTRACK [facility]   2021 10:30 AM H2 MATTHEW FASTRACK [address], RN  2020  4:58 PM

## 2020-06-09 ENCOUNTER — TELEPHONE (OUTPATIENT)
Dept: GYNECOLOGY | Age: 55
End: 2020-06-09

## 2020-06-10 ENCOUNTER — HOSPITAL ENCOUNTER (OUTPATIENT)
Dept: PREADMISSION TESTING | Age: 55
Discharge: HOME | End: 2020-06-10
Payer: COMMERCIAL

## 2020-06-10 VITALS
HEIGHT: 63 IN | SYSTOLIC BLOOD PRESSURE: 100 MMHG | HEART RATE: 76 BPM | BODY MASS INDEX: 29.59 KG/M2 | TEMPERATURE: 97.7 F | WEIGHT: 167 LBS | DIASTOLIC BLOOD PRESSURE: 63 MMHG

## 2020-06-10 LAB
ALBUMIN SERPL-MCNC: 3.4 G/DL (ref 3.5–5)
ALBUMIN/GLOB SERPL: 1 {RATIO} (ref 1.1–2.2)
ALP SERPL-CCNC: 83 U/L (ref 45–117)
ALT SERPL-CCNC: 23 U/L (ref 12–78)
ANION GAP SERPL CALC-SCNC: 6 MMOL/L (ref 5–15)
AST SERPL-CCNC: 21 U/L (ref 15–37)
BASOPHILS # BLD: 0 K/UL (ref 0–0.1)
BASOPHILS NFR BLD: 0 % (ref 0–1)
BILIRUB SERPL-MCNC: 0.2 MG/DL (ref 0.2–1)
BUN SERPL-MCNC: 9 MG/DL (ref 6–20)
BUN/CREAT SERPL: 18 (ref 12–20)
CALCIUM SERPL-MCNC: 8.9 MG/DL (ref 8.5–10.1)
CHLORIDE SERPL-SCNC: 107 MMOL/L (ref 97–108)
CO2 SERPL-SCNC: 28 MMOL/L (ref 21–32)
CREAT SERPL-MCNC: 0.51 MG/DL (ref 0.55–1.02)
DIFFERENTIAL METHOD BLD: ABNORMAL
EOSINOPHIL # BLD: 0.1 K/UL (ref 0–0.4)
EOSINOPHIL NFR BLD: 2 % (ref 0–7)
ERYTHROCYTE [DISTWIDTH] IN BLOOD BY AUTOMATED COUNT: 14.6 % (ref 11.5–14.5)
GLOBULIN SER CALC-MCNC: 3.5 G/DL (ref 2–4)
GLUCOSE SERPL-MCNC: 86 MG/DL (ref 65–100)
HCT VFR BLD AUTO: 32.5 % (ref 35–47)
HGB BLD-MCNC: 10.4 G/DL (ref 11.5–16)
IMM GRANULOCYTES # BLD AUTO: 0 K/UL (ref 0–0.04)
IMM GRANULOCYTES NFR BLD AUTO: 0 % (ref 0–0.5)
LYMPHOCYTES # BLD: 0.5 K/UL (ref 0.8–3.5)
LYMPHOCYTES NFR BLD: 17 % (ref 12–49)
MCH RBC QN AUTO: 30.4 PG (ref 26–34)
MCHC RBC AUTO-ENTMCNC: 32 G/DL (ref 30–36.5)
MCV RBC AUTO: 95 FL (ref 80–99)
MONOCYTES # BLD: 0.3 K/UL (ref 0–1)
MONOCYTES NFR BLD: 9 % (ref 5–13)
NEUTS SEG # BLD: 2.1 K/UL (ref 1.8–8)
NEUTS SEG NFR BLD: 72 % (ref 32–75)
NRBC # BLD: 0 K/UL (ref 0–0.01)
NRBC BLD-RTO: 0 PER 100 WBC
PLATELET # BLD AUTO: 176 K/UL (ref 150–400)
PMV BLD AUTO: 9.3 FL (ref 8.9–12.9)
POTASSIUM SERPL-SCNC: 4.1 MMOL/L (ref 3.5–5.1)
PROT SERPL-MCNC: 6.9 G/DL (ref 6.4–8.2)
RBC # BLD AUTO: 3.42 M/UL (ref 3.8–5.2)
RBC MORPH BLD: ABNORMAL
SODIUM SERPL-SCNC: 141 MMOL/L (ref 136–145)
WBC # BLD AUTO: 3 K/UL (ref 3.6–11)

## 2020-06-10 PROCEDURE — 85025 COMPLETE CBC W/AUTO DIFF WBC: CPT

## 2020-06-10 PROCEDURE — 80053 COMPREHEN METABOLIC PANEL: CPT

## 2020-06-10 PROCEDURE — 36415 COLL VENOUS BLD VENIPUNCTURE: CPT

## 2020-06-10 RX ORDER — FERROUS SULFATE 220 (44)/5
SOLUTION, ORAL ORAL DAILY
COMMUNITY

## 2020-06-10 RX ORDER — MENTHOL
1000 GEL (GRAM) TOPICAL DAILY
COMMUNITY

## 2020-06-10 RX ORDER — BISMUTH SUBSALICYLATE 262 MG
1 TABLET,CHEWABLE ORAL DAILY
COMMUNITY

## 2020-06-10 NOTE — PERIOP NOTES
Preoperative instructions reviewed with patient. Patient given SSI infection FAQS sheet. Given two bottles of skin prep chlorhexidine soap; given written and verbal instructions on use. Patient was given the opportunity to ask questions on the information provided. INFORMATION WAS GIVEN TO THE PATIENT FOR COVID 19 TESTING PRIOR TO SURGERY WITH THE OPPORTUNITY FOR QUESTIONS. THE PATIENT VERBALIZED UNDERSTANDING.

## 2020-06-18 ENCOUNTER — HOSPITAL ENCOUNTER (OUTPATIENT)
Dept: PREADMISSION TESTING | Age: 55
Discharge: HOME | End: 2020-06-18
Attending: NURSE PRACTITIONER
Payer: COMMERCIAL

## 2020-06-18 DIAGNOSIS — Z20.822 ENCOUNTER FOR LABORATORY TESTING FOR COVID-19 VIRUS: ICD-10-CM

## 2020-06-18 PROCEDURE — 87635 SARS-COV-2 COVID-19 AMP PRB: CPT

## 2020-06-19 LAB — SARS-COV-2, COV2NT: NOT DETECTED

## 2020-06-19 NOTE — H&P
[facility] [address], [address]  P [phone]  F [phone]      Patient ID:  Name:  Oseas Wallace  MRN:  [id number]  :  1965/54 y.o. Date:  2020      HISTORY OF PRESENT ILLNESS:  Oseas Wallace is a 48 y.o.  perimenopausal female who is being seen for at least sever cervical dysplasia. She is referred by Dr. Patrizia Cowan. Her most recent pap smear was read as HGSIL and she was high-risk HPV positive. She underwent subsequent colposcopy with biopsy. This revealed ZBIGNIEW 2-3, but an invasive process could not be excluded. On a pelvic ultrasound she was noted to have a complex 4.1 cm right adnexal mass and a 3.7 cm complex mass in the cervix. I have been asked to see her in consultation for further evaluation and management. I recommended an exam under anesthesia with cervical biopsy, cystoscopy, proctoscopy. This was performed on 19. Operative findings:  Replacement of cervix with carcinoma, with extension into proximal anterior vagina.  Parametrial thickening bilaterally.  Uterus still somewhat mobile.  No appreciable disease in the bladder or rectum. FINAL PATHOLOGIC DIAGNOSIS   Cervix, biopsy:   Invasive squamous cell carcinoma   Comment   The case is also seen by Dr. Carol Damico who concurs with the findings. The results are communicated with Dr. Billy Lima nurse, Rich Grissom at approximately 1:30pm on 2019. Following the procedure and confirmation of the diagnosis of cancer, I sent her for a PET/CT. This demonstrated stage IVB disease. I explained that she has metastatic disease and she is not a candidate for surgical resection or curative radiation therapy. I discussed with them that the standard of care would be systemic chemotherapy, consisting of Taxol/Cisplatin/Avastin. If she has a good response, we may reconsider pelvic radiation for local control.   It may also be necessary for control of bleeding, if that becomes a significant issue. She completed 8 cycles of Taxol/Cisplatin/Avastin chemotherapy and tolerated well. She responded very well to treatment. The supraclavicular node had resolved completely and her retroperitoneal adenopathy had markedly improved. Her primary tumor had also shrunk significantly. I recommended pelvic radiation to provide local control. Since she lives in [place] I referred her there for treatment. She subsequently was referred to radiation oncology at [facility] for brachytherapy. She completed all of her therapy in March 2020. She had not had any imaging since that time when she presented for follow-up. She reported some mild flank tenderness on the right, but nothing else. No vaginal bleeding, but some discharge. I sent her for a PET/CT to evaluate for persistent disease. There was no hypermetabolic activity remaining in the cervix or the previously identified nodes, however, there were two spots of activity in the left ovary. She presents today to review those results. ROS:   and GI review:  Negative  Cardiopulmonary review:  Negative   Musculoskeletal:  Negative    A comprehensive review of systems was negative except for that written in the History of Present Illness. , 10 point ROS      OB/GYN ROS:  There is no history of significant gyn problems or procedures.       Problem List:  Patient Active Problem List    Diagnosis Date Noted    On antineoplastic chemotherapy 07/03/2019    Metastatic squamous cell carcinoma to lymph node ([address] 07/03/2019    Hydroureter, right 07/03/2019    Chemotherapy induced neutropenia ([address] 06/06/2019    Malignant neoplasm of overlapping sites of cervix ([address] 05/20/2019    Complex cyst of right ovary 05/19/2019    Cervical cancer ([address] 05/2019     PMH:  Past Medical History:   Diagnosis Date    Cancer [facility])     CERVICAL    Cervical cancer ([address] 05/2019    Rheumatoid arthritis (HCC)       PSH:  Past Surgical History: Procedure Laterality Date    HX  SECTION      HX COLPOSCOPY  2019    HGSIL      Social History:  Social History     Tobacco Use    Smoking status: Former Smoker     Packs/day: 0.50     Years: 2.00     Pack years: 1.00     Last attempt to quit: 1991     Years since quittin.1    Smokeless tobacco: Never Used   Substance Use Topics    Alcohol use: Yes     Comment: OCCASIONALLY 1/MONTH      Family History:  Family History   Problem Relation Age of Onset    No Known Problems Mother     Other Father         ACCIDENTAL DEATH    Thyroid Disease Brother     Anesth Problems Neg Hx       Medications: (reviewed)  No current facility-administered medications for this encounter. Current Outpatient Medications   Medication Sig    multivitamin (ONE A DAY) tablet Take 1 Tab by mouth daily.  vitamin e (E GEMS) 1,000 unit capsule Take 1,000 Units by mouth daily.  ferrous sulfate 220 mg (44 mg iron)/5 mL solution Take  by mouth daily.  lidocaine-prilocaine (EMLA) topical cream Apply small amount over port area one hour before chemo treatment and cover with a Band-Aid    OTHER \"MANY VITAMINS AND SUPPLEMENTS\"    ibuprofen (ADVIL) 200 mg tablet Take 400 mg by mouth every eight (8) hours as needed for Pain. Allergies: (reviewed)  No Known Allergies       OBJECTIVE:    Physical Exam:  VITAL SIGNS: There were no vitals filed for this visit. There is no height or weight on file to calculate BMI. GENERAL FAUSTINA: Conversant, alert, oriented. No acute distress. HEENT: HEENT. No thyroid enlargement. No JVD. Neck: Supple without restrictions. RESPIRATORY: Clear to auscultation and percussion to the bases. No CVAT. CARDIOVASC: RRR without murmur/rub. GASTROINT: soft, non-tender, without masses or organomegaly   MUSCULOSKEL: no joint tenderness, deformity or swelling   EXTREMITIES: extremities normal, atraumatic, no cyanosis or edema   PELVIC: Normal external genitalia.   Normal distal vagina. Cervix flush with vaginal apex. No visible tumor. No firmness or nodularity. No appreciable parametrial thickening. She was not tender on exam.   RECTAL: Deferred   KEIRY SURVEY: No suspicious lymphadenopathy or edema noted. NEURO: Grossly intact. No acute deficit. Lab Data:    Lab Results   Component Value Date/Time    WBC 3.0 (L) 06/10/2020 11:26 AM    HGB 10.4 (L) 06/10/2020 11:26 AM    HCT 32.5 (L) 06/10/2020 11:26 AM    PLATELET 986 05/07/2260 11:26 AM    MCV 95.0 06/10/2020 11:26 AM     Lab Results   Component Value Date/Time    Sodium 141 06/10/2020 11:26 AM    Potassium 4.1 06/10/2020 11:26 AM    Chloride 107 06/10/2020 11:26 AM    CO2 28 06/10/2020 11:26 AM    Anion gap 6 06/10/2020 11:26 AM    Glucose 86 06/10/2020 11:26 AM    BUN 9 06/10/2020 11:26 AM    Creatinine 0.51 (L) 06/10/2020 11:26 AM    BUN/Creatinine ratio 18 06/10/2020 11:26 AM    GFR est AA >60 06/10/2020 11:26 AM    GFR est non-AA >60 06/10/2020 11:26 AM    Calcium 8.9 06/10/2020 11:26 AM         PET/CT (5/21/19)  HEAD/NECK: There is 1.8 cm left level 4 lymph node with increased metabolic  activity of 11.     CHEST: No foci of abnormal hypermetabolism. Low-grade activity in a normal right  axillary lymph node is most likely physiologic.     ABDOMEN/PELVIS: There is a 1 cm retrocrural lymph node on the right with  increased metabolic activity of 5.      There is periaortic adenopathy measuring 2 cm with increased metabolic activity  of 9.     There is an enlarged lymph node at the aortic bifurcation with increased  metabolic activity.       There is a complex appearing mass right pelvic sidewall measuring 3.6 cm with  increased metabolic activity of 02.4.   There is left iliac adenopathy with increased metabolic activity of 5.7.     There is a 1.4 cm soft tissue nodule intraperitoneal left lower quadrant with  increased metabolic activity of 7.     There is a mass in the cervix with increased metabolic activity of 12  There is right hydronephrosis.     SKELETON: No foci of abnormal hypermetabolism in the axial and visualized  appendicular skeleton.     IMPRESSION:   There is increased metabolic activity in a cervical mass consistent with  known primary neoplasm. There is adenopathy involving right pelvic sidewall, left iliac chain,  para-aortic chain, intra-abdominal nodule left lower quadrant, retrocrural lymph  node and left supra clavicular lymph node with increased metabolic activity  consistent with metastatic disease. The cervical mass appears to be obstructing  the right ureter resulting in moderate right hydroureteronephrosis. CT of chest/abdomen/pelvis (8/13/19)  CT chest:    With a left chest Port-A-Cath terminates in the SVC. The visualized thyroid  gland is unremarkable. The aorta and main pulmonary artery are normal in  caliber. The heart size is normal.  There is no pericardial or pleural effusion.        There are no enlarged axillary, mediastinal, or hilar lymph nodes.      There is no lung mass or airspace opacity. There is no pneumothorax. The  central airways are clear.     CT abdomen and pelvis: The liver, spleen, pancreas, and adrenal glands are normal. The gall bladder is  present  without intra- or extra-hepatic biliary dilatation.       The kidneys are symmetric without hydronephrosis.      There are no dilated bowel loops. The appendix is normal.       There are no enlarged lymph nodes. There is no free fluid or free air. The  aorta tapers without aneurysm.     The urinary bladder is normal.  There is no pelvic mass. Dominant bilateral  ovarian follicles are noted.     There is degenerative change at L5-S1. There is no aggressive bony lesion.     IMPRESSION:   No evidence for metastatic disease or other acute abnormality in the chest,  abdomen, or pelvis. CT of chest/abdomen/pelvis (10/9/19)  Chest: No thoracic lymphadenopathy.  Specifically, the right retrocrural and left  supraclavicular lymph node seen on prior PET/CT are no longer present. A few  subcentimeter foci of subpleural atelectasis are noted in the lower lobes. The  lungs are otherwise clear. The central airways are patent. No pneumothorax or  pleural effusion. The heart size is normal. A left subclavian ported catheter  terminates in the superior SVC. Trace fluid in the mid esophagus (3-18) suggests  gastroesophageal reflux disease. The T4-T5 disc space is fused anteriorly  (765-90).    Abdomen: No abdominal lymphadenopathy. The stomach, duodenum, liver,  gallbladder, pancreas, spleen, adrenals, and kidneys are normal.     Cervical carcinoma: The primary tumor is not visible. Pelvic malgorzata metastases  have continued to decrease in size. The largest, a conglomerate mass of multiple  nodes posterior to the right external iliac vessels, now measures 23 mm in short  axis and 23 x 30 x 45 mm in 3 dimensions, versus 23 x 34 x 49 mm on 8/12/2019  and 42 x 53 x 75 mm on 5/21/2019. Rim calcification around the treated  metastasis has increased from 8/12/2019. A left external iliac malgorzata metastasis  has also decreased, and measures 17 mm in short axis today, versus 21 mm on  8/12/2019. Bilateral, iliac, and aortocaval malgorzata metastases have also decreased  from 8/12/2019 and are no longer pathologically enlarged. Peritoneal  carcinomatosis, visible as left lower quadrant omental nodules on PET/CT, is no  longer visible. No overt peritoneal or omental nodularity. No ascites.     Pelvis: The cecum is mobile in the right upper quadrant, a normal variant. The  small bowel, ileocecal junction, appendix, colon, and bladder are normal. No  free air.     IMPRESSION:  1. Further decrease in pelvic and retroperitoneal malgorzata metastases. 2. No visible, residual, peritoneal carcinomatosis. 3. No metastases in the chest. Retrocrural and supraclavicular malgorzata metastases  are no longer visible.       CT of chest/abdomen/pelvis (11/22/19)  THYROID: No nodule. MEDIASTINUM: Left subclavian port catheter terminates in the superior aspect of  the SVC/junction of the left brachycephalic vein. No mediastinal adenopathy. YNES: No mass or lymphadenopathy. THORACIC AORTA: No dissection or aneurysm. MAIN PULMONARY ARTERY: Normal in caliber. TRACHEA/BRONCHI: Patent. ESOPHAGUS: No wall thickening or dilatation. HEART: Normal in size. PLEURA: No effusion or pneumothorax. LUNGS: No nodule, mass, or airspace disease. LIVER: No mass or biliary dilatation. GALLBLADDER: Unremarkable. SPLEEN: No mass. PANCREAS: No mass or ductal dilatation. ADRENALS: Unremarkable. KIDNEYS: No mass, calculus, or hydronephrosis. STOMACH: Unremarkable. SMALL BOWEL: No dilatation or wall thickening. COLON: No dilatation or wall thickening. APPENDIX: Nonvisualized  PERITONEUM: No ascites or mesenteric lymphadenopathy. No omental nodularity. RETROPERITONEUM: Redemonstration of para-aortic lymph nodes which are prominent  including a left common iliac lymph node measuring 6 mm in short axis and the  right common iliac lymph node measuring 11 mm in short axis (3:88). The right  common iliac lymph node is stable in size. The dominant lymph node in the right  external iliac chain measures 21 mm in short axis, previously 23 mm. The left  external iliac lymph node measures 15 mm in short axis, previously 16 mm. REPRODUCTIVE ORGANS: Uterus and adnexal structures are present and grossly  normal.  URINARY BLADDER: No mass or calculus. BONES: Unchanged millimetric benign-appearing sclerotic focus in the right  scapula. No aggressive osseous lesions. ADDITIONAL COMMENTS: N/A     IMPRESSION:  1.  Stable size of retroperitoneal and pelvic sidewall lymph nodes compared to  October 9, 2019. While a few of these lymph nodes measure 1 to 2 mm less in  size, this does not reflect a significant change. 2.  No evidence of intrathoracic metastasis.       Pelvic MRI (2/27/20) - [facility]  Uterus:  No fibroids. Endometrium is grossly unremarkable. There is a Miah sleeve which terminates in the endometrial canal.     Cervix:  The cervix is small and contains a Nabothian cyst. There is abnormal signal in the patients cervix seen anteriorly and along the right side of the cervix. There is no large cervical mass. The anterior paracervical fat is hazy, it is unclear if this is related to treatment effect. Ovaries: There is a subcentimeter cyst of the right ovary the left ovary is mildly full but appears morphologically similar when compared with the prior exam.    Vagina:  Collapsed. Lymph nodes: There is persistent pathologic bilateral pelvic adenopathy, but it is improving. On the prior exam there was a right external iliac/obturator lymph node which measured at least 3.5 cm short axis and currently measures closer to 2.1 cm. There is a left external iliac chain lymph node which measured at least 2.2 cm short axis and currently measures approximately 1.4 cm short axis. There is no worsening adenopathy. Of note, the field of view this exam does not extend above the aortic bifurcation. Bladder:  Collapsed and trabeculated. IMPRESSION:  1. Probable residual tumor along the right side and anterior portion of the cervix, as described above. Absence of similar prior exam and absence of postcontrast sequences does compromise comparison assessment. Interpretation was made within this caveat. 2.  Improving pelvic adenopathy. 3.  Remainder as above      PET/CT (5/28/20)  HEAD/NECK: No apparent foci of abnormal hypermetabolism. Cerebral evaluation is  limited by normal intense activity. Activity in the left supraclavicular lymph  node has resolved.     CHEST: No foci of abnormal hypermetabolism. Retrocrural adenopathy has resolved. Low-grade activity in the axilla lymph nodes is most likely reactive     ABDOMEN/PELVIS: Retroperitoneal activity has resolved.  Pelvic adenopathy has  resolved. There is a calcified right pelvic sidewall lymph node.     There are 2 small foci of increased activity with SUV of 13.9 which which are  within the left ovary     SKELETON: No foci of abnormal hypermetabolism in the axial and visualized  appendicular skeleton.     IMPRESSION:   1. Increased metabolic activity in bilateral pelvic adenopathy, retroperitoneal  adenopathy and left supraclavicular adenopathy has resolved.     There are 2 small foci of increased metabolic activity which correspond to a  normal sized left ovary of uncertain significance and most likely are  physiologic although small metastatic foci are not entirely excluded and  continued close follow-up would be helpful. IMPRESSION/PLAN:  Zaynab Camejo is a 48 y.o. 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 She was treated with 8 cycles of Taxol/Cistplatin/Avastin chemotherapy and had a great response. She was then treated with pelvic radiation therapy, including brachytherapy. She has had an excellent response to her radiation therapy, with no obvious disease remaining on exam.  A PET demonstrated resolution of her previously noted disease, however, there were two spots of activity in the left ovary. These could represents physiologic cysts, but malignancy cannot be excluded. I reviewed the films with my partner, Dr. Chantell Nuñez. He and I are both concerned about the lesions in the ovary and agree that a laparoscopic evaluation with BSO is very reasonable. Hopefully we will find that they are benign in nature. It will also give us an opportunity to evaluate the peritoneal cavity, and possibly the retroperitoneum. Depending on the amount of scar tissue and fibrosis, we will try to sample some of the pelvic nodes. There is a large node in the right obturator space, but it does not demonstrate hypermetabolic activity.   Dr. Chantell Nuñez and I agreed that a hysterectomy would not be beneficial and would only increase potential complications. I explained this all to the patient and her  and they agree with the plan of laparoscopic evaluation and BSO. She was counseled on the risks, benefits, indications, and alternatives of surgery. Her questions were answered and she wishes to proceed. The patient was counseled at length about the risks of ashley Covid-19 during their perioperative period and any recovery window from their procedure. The patient was made aware that ashley Covid-19  may worsen their prognosis for recovering from their procedure and lend to a higher morbidity and/or mortality risk. All material risks, benefits, and reasonable alternatives including postponing the procedure were discussed. The patient does  wish to proceed with the procedure at this time. Signed By: Kulwant Blackwood MD     6/19/2020/9:50 AM       Date of Surgery Update:  William Mullen was seen and examined. History and physical has been reviewed. The patient has been examined. There have been no significant clinical changes since the completion of the originally dated History and Physical.  Patient identified by surgeon; surgical site was confirmed by patient and surgeon.     Signed By: Kulwant Blackwood MD     June 22, 2020 9:11 AM

## 2020-06-20 ENCOUNTER — ANESTHESIA EVENT (OUTPATIENT)
Dept: SURGERY | Age: 55
End: 2020-06-20
Payer: COMMERCIAL

## 2020-06-22 ENCOUNTER — HOSPITAL ENCOUNTER (OUTPATIENT)
Age: 55
Setting detail: OUTPATIENT SURGERY
Discharge: HOME | End: 2020-06-22
Attending: OBSTETRICS & GYNECOLOGY | Admitting: OBSTETRICS & GYNECOLOGY
Payer: COMMERCIAL

## 2020-06-22 ENCOUNTER — ANESTHESIA (OUTPATIENT)
Dept: SURGERY | Age: 55
End: 2020-06-22
Payer: COMMERCIAL

## 2020-06-22 VITALS
BODY MASS INDEX: 29.58 KG/M2 | TEMPERATURE: 98.4 F | OXYGEN SATURATION: 98 % | DIASTOLIC BLOOD PRESSURE: 62 MMHG | RESPIRATION RATE: 11 BRPM | HEART RATE: 63 BPM | WEIGHT: 167 LBS | SYSTOLIC BLOOD PRESSURE: 104 MMHG

## 2020-06-22 DIAGNOSIS — G89.18 ACUTE POSTOPERATIVE PAIN: Primary | ICD-10-CM

## 2020-06-22 PROCEDURE — 77030002933 HC SUT MCRYL J&J -A: Performed by: OBSTETRICS & GYNECOLOGY

## 2020-06-22 PROCEDURE — 74011250636 HC RX REV CODE- 250/636: Performed by: NURSE ANESTHETIST, CERTIFIED REGISTERED

## 2020-06-22 PROCEDURE — 77030011640 HC PAD GRND REM COVD -A: Performed by: OBSTETRICS & GYNECOLOGY

## 2020-06-22 PROCEDURE — 76210000021 HC REC RM PH II 0.5 TO 1 HR: Performed by: OBSTETRICS & GYNECOLOGY

## 2020-06-22 PROCEDURE — 74011000250 HC RX REV CODE- 250: Performed by: NURSE ANESTHETIST, CERTIFIED REGISTERED

## 2020-06-22 PROCEDURE — 77030040922 HC BLNKT HYPOTHRM STRY -A

## 2020-06-22 PROCEDURE — 77030040830 HC CATH URETH FOL MDII -A: Performed by: OBSTETRICS & GYNECOLOGY

## 2020-06-22 PROCEDURE — 77030010507 HC ADH SKN DERMBND J&J -B: Performed by: OBSTETRICS & GYNECOLOGY

## 2020-06-22 PROCEDURE — 74011250636 HC RX REV CODE- 250/636: Performed by: ANESTHESIOLOGY

## 2020-06-22 PROCEDURE — 76010000149 HC OR TIME 1 TO 1.5 HR: Performed by: OBSTETRICS & GYNECOLOGY

## 2020-06-22 PROCEDURE — 77030026438 HC STYL ET INTUB CARD -A: Performed by: NURSE ANESTHETIST, CERTIFIED REGISTERED

## 2020-06-22 PROCEDURE — 77030008684 HC TU ET CUF COVD -B: Performed by: NURSE ANESTHETIST, CERTIFIED REGISTERED

## 2020-06-22 PROCEDURE — 74011000258 HC RX REV CODE- 258: Performed by: OBSTETRICS & GYNECOLOGY

## 2020-06-22 PROCEDURE — 74011000250 HC RX REV CODE- 250: Performed by: OBSTETRICS & GYNECOLOGY

## 2020-06-22 PROCEDURE — 77030009851 HC PCH RTVR ENDOSC AMR -B: Performed by: OBSTETRICS & GYNECOLOGY

## 2020-06-22 PROCEDURE — 77030031139 HC SUT VCRL2 J&J -A: Performed by: OBSTETRICS & GYNECOLOGY

## 2020-06-22 PROCEDURE — 77030034154 HC SHR COAG HARM ACE J&J -F: Performed by: OBSTETRICS & GYNECOLOGY

## 2020-06-22 PROCEDURE — 88307 TISSUE EXAM BY PATHOLOGIST: CPT

## 2020-06-22 PROCEDURE — 77030007955 HC PCH ENDOSC SPEC J&J -B: Performed by: OBSTETRICS & GYNECOLOGY

## 2020-06-22 PROCEDURE — 77030018836 HC SOL IRR NACL ICUM -A: Performed by: OBSTETRICS & GYNECOLOGY

## 2020-06-22 PROCEDURE — 88112 CYTOPATH CELL ENHANCE TECH: CPT

## 2020-06-22 PROCEDURE — 77030012770 HC TRCR OPT FX AMR -B: Performed by: OBSTETRICS & GYNECOLOGY

## 2020-06-22 PROCEDURE — 77030020829: Performed by: OBSTETRICS & GYNECOLOGY

## 2020-06-22 PROCEDURE — 77030008756 HC TU IRR SUC STRY -B: Performed by: OBSTETRICS & GYNECOLOGY

## 2020-06-22 PROCEDURE — 76060000033 HC ANESTHESIA 1 TO 1.5 HR: Performed by: OBSTETRICS & GYNECOLOGY

## 2020-06-22 PROCEDURE — 77030020263 HC SOL INJ SOD CL0.9% LFCR 1000ML: Performed by: OBSTETRICS & GYNECOLOGY

## 2020-06-22 PROCEDURE — 76210000016 HC OR PH I REC 1 TO 1.5 HR: Performed by: OBSTETRICS & GYNECOLOGY

## 2020-06-22 PROCEDURE — 77030040361 HC SLV COMPR DVT MDII -B: Performed by: OBSTETRICS & GYNECOLOGY

## 2020-06-22 PROCEDURE — 77030008608 HC TRCR ENDOSC SMTH AMR -B: Performed by: OBSTETRICS & GYNECOLOGY

## 2020-06-22 RX ORDER — SODIUM CHLORIDE 9 MG/ML
50 INJECTION, SOLUTION INTRAVENOUS CONTINUOUS
Status: DISCONTINUED | OUTPATIENT
Start: 2020-06-22 | End: 2020-06-22 | Stop reason: HOSPADM

## 2020-06-22 RX ORDER — TRAMADOL HYDROCHLORIDE 50 MG/1
50 TABLET ORAL
Qty: 20 TAB | Refills: 0 | Status: SHIPPED | OUTPATIENT
Start: 2020-06-22 | End: 2020-06-27

## 2020-06-22 RX ORDER — SODIUM CHLORIDE 0.9 % (FLUSH) 0.9 %
5-40 SYRINGE (ML) INJECTION EVERY 8 HOURS
Status: DISCONTINUED | OUTPATIENT
Start: 2020-06-22 | End: 2020-06-22 | Stop reason: HOSPADM

## 2020-06-22 RX ORDER — HYDROMORPHONE HYDROCHLORIDE 1 MG/ML
0.2 INJECTION, SOLUTION INTRAMUSCULAR; INTRAVENOUS; SUBCUTANEOUS
Status: DISCONTINUED | OUTPATIENT
Start: 2020-06-22 | End: 2020-06-22 | Stop reason: HOSPADM

## 2020-06-22 RX ORDER — PROPOFOL 10 MG/ML
INJECTION, EMULSION INTRAVENOUS AS NEEDED
Status: DISCONTINUED | OUTPATIENT
Start: 2020-06-22 | End: 2020-06-22 | Stop reason: HOSPADM

## 2020-06-22 RX ORDER — KETOROLAC TROMETHAMINE 30 MG/ML
INJECTION, SOLUTION INTRAMUSCULAR; INTRAVENOUS AS NEEDED
Status: DISCONTINUED | OUTPATIENT
Start: 2020-06-22 | End: 2020-06-22 | Stop reason: HOSPADM

## 2020-06-22 RX ORDER — FENTANYL CITRATE 50 UG/ML
25 INJECTION, SOLUTION INTRAMUSCULAR; INTRAVENOUS
Status: DISCONTINUED | OUTPATIENT
Start: 2020-06-22 | End: 2020-06-22 | Stop reason: HOSPADM

## 2020-06-22 RX ORDER — MORPHINE SULFATE 10 MG/ML
2 INJECTION, SOLUTION INTRAMUSCULAR; INTRAVENOUS
Status: DISCONTINUED | OUTPATIENT
Start: 2020-06-22 | End: 2020-06-22 | Stop reason: HOSPADM

## 2020-06-22 RX ORDER — SUCCINYLCHOLINE CHLORIDE 20 MG/ML
INJECTION INTRAMUSCULAR; INTRAVENOUS AS NEEDED
Status: DISCONTINUED | OUTPATIENT
Start: 2020-06-22 | End: 2020-06-22 | Stop reason: HOSPADM

## 2020-06-22 RX ORDER — SODIUM CHLORIDE 0.9 % (FLUSH) 0.9 %
5-40 SYRINGE (ML) INJECTION AS NEEDED
Status: DISCONTINUED | OUTPATIENT
Start: 2020-06-22 | End: 2020-06-22 | Stop reason: HOSPADM

## 2020-06-22 RX ORDER — MIDAZOLAM HYDROCHLORIDE 1 MG/ML
1 INJECTION, SOLUTION INTRAMUSCULAR; INTRAVENOUS AS NEEDED
Status: DISCONTINUED | OUTPATIENT
Start: 2020-06-22 | End: 2020-06-22 | Stop reason: HOSPADM

## 2020-06-22 RX ORDER — ONDANSETRON 2 MG/ML
4 INJECTION INTRAMUSCULAR; INTRAVENOUS AS NEEDED
Status: DISCONTINUED | OUTPATIENT
Start: 2020-06-22 | End: 2020-06-22 | Stop reason: HOSPADM

## 2020-06-22 RX ORDER — FENTANYL CITRATE 50 UG/ML
50 INJECTION, SOLUTION INTRAMUSCULAR; INTRAVENOUS AS NEEDED
Status: DISCONTINUED | OUTPATIENT
Start: 2020-06-22 | End: 2020-06-22 | Stop reason: HOSPADM

## 2020-06-22 RX ORDER — SODIUM CHLORIDE 9 MG/ML
25 INJECTION, SOLUTION INTRAVENOUS CONTINUOUS
Status: DISCONTINUED | OUTPATIENT
Start: 2020-06-22 | End: 2020-06-22 | Stop reason: HOSPADM

## 2020-06-22 RX ORDER — MIDAZOLAM HYDROCHLORIDE 1 MG/ML
0.5 INJECTION, SOLUTION INTRAMUSCULAR; INTRAVENOUS
Status: DISCONTINUED | OUTPATIENT
Start: 2020-06-22 | End: 2020-06-22 | Stop reason: HOSPADM

## 2020-06-22 RX ORDER — LIDOCAINE HYDROCHLORIDE 10 MG/ML
0.1 INJECTION, SOLUTION EPIDURAL; INFILTRATION; INTRACAUDAL; PERINEURAL AS NEEDED
Status: DISCONTINUED | OUTPATIENT
Start: 2020-06-22 | End: 2020-06-22 | Stop reason: HOSPADM

## 2020-06-22 RX ORDER — HYDROCODONE BITARTRATE AND ACETAMINOPHEN 5; 325 MG/1; MG/1
1 TABLET ORAL AS NEEDED
Status: DISCONTINUED | OUTPATIENT
Start: 2020-06-22 | End: 2020-06-22 | Stop reason: HOSPADM

## 2020-06-22 RX ORDER — ONDANSETRON 2 MG/ML
INJECTION INTRAMUSCULAR; INTRAVENOUS AS NEEDED
Status: DISCONTINUED | OUTPATIENT
Start: 2020-06-22 | End: 2020-06-22 | Stop reason: HOSPADM

## 2020-06-22 RX ORDER — NEOSTIGMINE METHYLSULFATE 1 MG/ML
INJECTION, SOLUTION INTRAVENOUS AS NEEDED
Status: DISCONTINUED | OUTPATIENT
Start: 2020-06-22 | End: 2020-06-22 | Stop reason: HOSPADM

## 2020-06-22 RX ORDER — SODIUM CHLORIDE, SODIUM LACTATE, POTASSIUM CHLORIDE, CALCIUM CHLORIDE 600; 310; 30; 20 MG/100ML; MG/100ML; MG/100ML; MG/100ML
125 INJECTION, SOLUTION INTRAVENOUS CONTINUOUS
Status: DISCONTINUED | OUTPATIENT
Start: 2020-06-22 | End: 2020-06-22 | Stop reason: HOSPADM

## 2020-06-22 RX ORDER — SODIUM CHLORIDE, SODIUM LACTATE, POTASSIUM CHLORIDE, CALCIUM CHLORIDE 600; 310; 30; 20 MG/100ML; MG/100ML; MG/100ML; MG/100ML
75 INJECTION, SOLUTION INTRAVENOUS CONTINUOUS
Status: DISCONTINUED | OUTPATIENT
Start: 2020-06-22 | End: 2020-06-22 | Stop reason: HOSPADM

## 2020-06-22 RX ORDER — MIDAZOLAM HYDROCHLORIDE 1 MG/ML
INJECTION, SOLUTION INTRAMUSCULAR; INTRAVENOUS AS NEEDED
Status: DISCONTINUED | OUTPATIENT
Start: 2020-06-22 | End: 2020-06-22 | Stop reason: HOSPADM

## 2020-06-22 RX ORDER — LIDOCAINE HYDROCHLORIDE 20 MG/ML
INJECTION, SOLUTION EPIDURAL; INFILTRATION; INTRACAUDAL; PERINEURAL AS NEEDED
Status: DISCONTINUED | OUTPATIENT
Start: 2020-06-22 | End: 2020-06-22 | Stop reason: HOSPADM

## 2020-06-22 RX ORDER — FENTANYL CITRATE 50 UG/ML
INJECTION, SOLUTION INTRAMUSCULAR; INTRAVENOUS AS NEEDED
Status: DISCONTINUED | OUTPATIENT
Start: 2020-06-22 | End: 2020-06-22 | Stop reason: HOSPADM

## 2020-06-22 RX ORDER — GLYCOPYRROLATE 0.2 MG/ML
INJECTION INTRAMUSCULAR; INTRAVENOUS AS NEEDED
Status: DISCONTINUED | OUTPATIENT
Start: 2020-06-22 | End: 2020-06-22 | Stop reason: HOSPADM

## 2020-06-22 RX ORDER — DIPHENHYDRAMINE HYDROCHLORIDE 50 MG/ML
12.5 INJECTION, SOLUTION INTRAMUSCULAR; INTRAVENOUS AS NEEDED
Status: DISCONTINUED | OUTPATIENT
Start: 2020-06-22 | End: 2020-06-22 | Stop reason: HOSPADM

## 2020-06-22 RX ORDER — ROCURONIUM BROMIDE 10 MG/ML
INJECTION, SOLUTION INTRAVENOUS AS NEEDED
Status: DISCONTINUED | OUTPATIENT
Start: 2020-06-22 | End: 2020-06-22 | Stop reason: HOSPADM

## 2020-06-22 RX ORDER — BUPIVACAINE HYDROCHLORIDE 5 MG/ML
INJECTION, SOLUTION EPIDURAL; INTRACAUDAL AS NEEDED
Status: DISCONTINUED | OUTPATIENT
Start: 2020-06-22 | End: 2020-06-22 | Stop reason: HOSPADM

## 2020-06-22 RX ORDER — DEXAMETHASONE SODIUM PHOSPHATE 4 MG/ML
INJECTION, SOLUTION INTRA-ARTICULAR; INTRALESIONAL; INTRAMUSCULAR; INTRAVENOUS; SOFT TISSUE AS NEEDED
Status: DISCONTINUED | OUTPATIENT
Start: 2020-06-22 | End: 2020-06-22 | Stop reason: HOSPADM

## 2020-06-22 RX ADMIN — FENTANYL CITRATE 25 MCG: 50 INJECTION, SOLUTION INTRAMUSCULAR; INTRAVENOUS at 11:50

## 2020-06-22 RX ADMIN — ROCURONIUM BROMIDE 30 MG: 10 SOLUTION INTRAVENOUS at 10:21

## 2020-06-22 RX ADMIN — FENTANYL CITRATE 25 MCG: 50 INJECTION, SOLUTION INTRAMUSCULAR; INTRAVENOUS at 11:39

## 2020-06-22 RX ADMIN — LIDOCAINE HYDROCHLORIDE 100 MG: 20 INJECTION, SOLUTION EPIDURAL; INFILTRATION; INTRACAUDAL; PERINEURAL at 10:16

## 2020-06-22 RX ADMIN — ONDANSETRON HYDROCHLORIDE 4 MG: 2 INJECTION, SOLUTION INTRAMUSCULAR; INTRAVENOUS at 11:02

## 2020-06-22 RX ADMIN — GLYCOPYRROLATE 0.4 MG: 0.2 INJECTION, SOLUTION INTRAMUSCULAR; INTRAVENOUS at 11:02

## 2020-06-22 RX ADMIN — KETOROLAC TROMETHAMINE 30 MG: 30 INJECTION, SOLUTION INTRAMUSCULAR; INTRAVENOUS at 11:11

## 2020-06-22 RX ADMIN — MIDAZOLAM 2 MG: 1 INJECTION INTRAMUSCULAR; INTRAVENOUS at 10:08

## 2020-06-22 RX ADMIN — SODIUM CHLORIDE, SODIUM LACTATE, POTASSIUM CHLORIDE, AND CALCIUM CHLORIDE 125 ML/HR: 600; 310; 30; 20 INJECTION, SOLUTION INTRAVENOUS at 09:27

## 2020-06-22 RX ADMIN — PROPOFOL 150 MG: 10 INJECTION, EMULSION INTRAVENOUS at 10:16

## 2020-06-22 RX ADMIN — CEFOTETAN DISODIUM 2 G: 2 INJECTION, POWDER, FOR SOLUTION INTRAMUSCULAR; INTRAVENOUS at 10:30

## 2020-06-22 RX ADMIN — FENTANYL CITRATE 50 MCG: 50 INJECTION, SOLUTION INTRAMUSCULAR; INTRAVENOUS at 10:16

## 2020-06-22 RX ADMIN — SUCCINYLCHOLINE CHLORIDE 140 MG: 20 INJECTION, SOLUTION INTRAMUSCULAR; INTRAVENOUS at 10:16

## 2020-06-22 RX ADMIN — ROCURONIUM BROMIDE 10 MG: 10 SOLUTION INTRAVENOUS at 10:16

## 2020-06-22 RX ADMIN — DEXAMETHASONE SODIUM PHOSPHATE 8 MG: 4 INJECTION, SOLUTION INTRAMUSCULAR; INTRAVENOUS at 10:30

## 2020-06-22 RX ADMIN — FENTANYL CITRATE 50 MCG: 50 INJECTION, SOLUTION INTRAMUSCULAR; INTRAVENOUS at 11:03

## 2020-06-22 RX ADMIN — Medication 3 MG: at 11:02

## 2020-06-22 NOTE — BRIEF OP NOTE
Brief Postoperative Note    Patient: Zaynab Camejo  YOB: 1965  MRN: [id number]    Date of Procedure: 6/22/2020     Pre-Op Diagnosis: CERVICAL CANCER    Post-Op Diagnosis: Same as preoperative diagnosis. Procedure(s):  LAPAROSCOPIC BILATERAL SALPINGOOOPHORECTOMY    Surgeon(s):  Anaya Woods MD    Surgical Assistant: Physician Assistant: Destin Hanson PA-C    Anesthesia: General     Estimated Blood Loss (mL): Minimal    Complications: None    Specimens:   ID Type Source Tests Collected by Time Destination   1 : Left fallopian tube and left ovary Fresh Fallopian Catherine Steele MD 6/22/2020 1056 Pathology   2 : Right fallopian tube and right ovary Fresh Fallopian Catherine Steele MD 6/22/2020 1056 Pathology   1 : Pelvic washings for cytology Fresh Pelvis  Anaya Woods MD 6/22/2020 1048 Cytology        Implants: * No implants in log *    Drains: * No LDAs found *    Findings: Normal appearing tubes and ovaries. Uterus mobile. Nothing suspicious for malignancy. Radiation changes noted in the pelvis. Fibrosis of the retroperitoneum.       Electronically Signed by Clari Painter MD on 6/22/2020 at 11:02 AM

## 2020-06-22 NOTE — ROUTINE PROCESS
Patient: Ellie Rubi MRN: [id number]  SSN: [id number]   YOB: 1965  Age: 48 y.o. Sex: female     Patient is status post Procedure(s):  LAPAROSCOPIC BILATERAL SALPINGO-OOPHORECTOMY. Surgeon(s) and Role:     Hector Mackay MD - Primary    Local/Dose/Irrigation:  0.9% normal saline used for irrigation                Venous Access Device port 06/07/19 Upper chest (subclavicular area), left (Active)      Peripheral IV 06/22/20 Left Forearm (Active)   Site Assessment Clean, dry, & intact 6/22/2020  9:27 AM   Phlebitis Assessment 0 6/22/2020  9:27 AM   Infiltration Assessment 0 6/22/2020  9:27 AM   Dressing Status Clean, dry, & intact 6/22/2020  9:27 AM   Dressing Type Transparent 6/22/2020  9:27 AM   Hub Color/Line Status Pink 6/22/2020  9:27 AM                           Dressing/Packing:  Wound Abdomen 3 trocar sites-Dressing Type: Sutures; Topical skin adhesive/glue(Dermabond) (06/22/20 1059)  Wound Vagina-Dressing Type: Arpita-pad (06/22/20 1059)    Splint/Cast:  ]    Other:  SCDs

## 2020-06-22 NOTE — DISCHARGE INSTRUCTIONS
Toradol 30 mg given at 46 am      [facility], [address], [address] [place]LL [phone]  F [phone]      Patient Discharge Instructions    Ellie Rubi / [id number] : 1965    Admitted 2020 Discharged: 2020     Take Home Medications     No current facility-administered medications on file prior to encounter. Current Outpatient Medications on File Prior to Encounter   Medication Sig Dispense Refill    OTHER \"MANY VITAMINS AND SUPPLEMENTS\"      ibuprofen (ADVIL) 200 mg tablet Take 400 mg by mouth every eight (8) hours as needed for Pain.  lidocaine-prilocaine (EMLA) topical cream Apply small amount over port area one hour before chemo treatment and cover with a Band-Aid 30 g 2       · It is important that you take the medication exactly as they are prescribed. · Keep your medication in the bottles provided by the pharmacist and keep a list of the medication names, dosages, and times to be taken in your wallet. · Do not take other medications without consulting your doctor. What to do at Home    Recommended diet: Regular Diet, stay hydrated. You should take a stool softener such as colace for constipation. If constipation persists for >24 hours you should take a dose of Milk of Magnesia. Call if your constipation persists. If you have had a hysterectomy or vaginal surgery you may experience vaginal spotting. This is acceptable. Should the bleeding require more that 4 pads a day please call our office. Nothing per vagina for 6-8 weeks. Recommended activity:   No driving for 1 week and/or while on pain medication  Walk at least 6 times per day  Showers okay, no tub bathing/swimming for two weeks  Activity as able, stairs okay. If you had a laparoscopic procedure, no lifting greater than 25 lbs for 3 weeks. If you had an open procedure, no heavy lifting greater than 15 lbs for 6 weeks.     If you experience any of the following persisting symptoms:    Nausea/vomiting, fever > 101 F, diarrhea/constipation, increasing pain despite medication, increasing vaginal discharge or any concerns, please call our office. Follow-up with Dr. Montse Robb in 2 weeks. Call [phone] for an appointment. Information obtained by :  I understand that if any problems occur once I am at home I am to contact my physician. I understand and acknowledge receipt of the instructions indicated above. Physician's or R.N.'s Signature                                                                  Date/Time                                                                                                                                              Patient or Representative Signature                                                          Date/Time      ______________________________________________________________________    Anesthesia Discharge Instructions    After general anesthesia or intervenous sedation, for 24 hours or while taking prescription Narcotics:  · Limit your activities  · Do not drive or operate hazardous machinery  · If you have not urinated within 8 hours after discharge, please contact your surgeon on call. · Do not make important personal or business decisions  · Do not drink alcoholic beverages    Report the following to your surgeon:  · Excessive pain, swelling, redness or odor of or around the surgical area  · Temperature over 100.5 degrees  · Nausea and vomiting lasting longer than 4 hours or if unable to take medication  · Any signs of decreased circulation or nerve impairment to extremity:  Change in color, persistent numbness, tingling, coldness or increased pain.   · Any questions

## 2020-06-22 NOTE — ANESTHESIA POSTPROCEDURE EVALUATION
Procedure(s):  LAPAROSCOPIC BILATERAL SALPINGO-OOPHORECTOMY. general    Anesthesia Post Evaluation        Patient location during evaluation: PACU  Note status: Adequate. Level of consciousness: responsive to verbal stimuli and sleepy but conscious  Pain management: satisfactory to patient  Airway patency: patent  Anesthetic complications: no  Cardiovascular status: acceptable  Respiratory status: acceptable  Hydration status: acceptable  Comments: +Post-Anesthesia Evaluation and Assessment    Patient: Alicia Sandoval MRN: [id number]  SSN: [id number]   YOB: 1965  Age: 48 y.o. Sex: female          Cardiovascular Function/Vital Signs    /62   Pulse 63   Temp 36.9 °C (98.4 °F)   Resp 11   Wt 75.8 kg (167 lb)   SpO2 98%   BMI 29.58 kg/m²     Patient is status post Procedure(s):  LAPAROSCOPIC BILATERAL SALPINGO-OOPHORECTOMY. Nausea/Vomiting: Controlled. Postoperative hydration reviewed and adequate. Pain:  Pain Scale 1: Numeric (0 - 10) (06/22/20 1205)  Pain Intensity 1: 4 (06/22/20 1205)   Managed. Neurological Status:   Neuro (WDL): Within Defined Limits (06/22/20 1205)   At baseline. Mental Status and Level of Consciousness: Arousable. Pulmonary Status:   O2 Device: Room air (06/22/20 1205)   Adequate oxygenation and airway patent. Complications related to anesthesia: None    Post-anesthesia assessment completed. No concerns. I have evaluated the patient and the patient is stable and ready to be discharged from PACU . Signed By: Robert Covington MD    6/22/2020        INITIAL Post-op Vital signs:   Vitals Value Taken Time   /62 6/22/2020 12:15 PM   Temp 36.9 °C (98.4 °F) 6/22/2020 11:30 AM   Pulse 69 6/22/2020 12:26 PM   Resp 0 6/22/2020 12:26 PM   SpO2 100 % 6/22/2020 12:26 PM   Vitals shown include unvalidated device data.

## 2020-06-22 NOTE — OP NOTES
Gynecologic Oncology Operative Report    Jack Figueroa  6/22/2020    Pre-operative dx:  Stage IV cervical cancer    Post-operative dx:  Stage IV cervical cancer    Procedure:  Laparoscopic bilateral salpingooophorectomy    Surgeon:  Bebeto Boykin MD    Assistant:  Nuvia Sibley PA-C     Anesthesia:  GETA    EBL:  <25 cc    Complications:  None    Implants:  None    Specimens:   ID Type Source Tests Collected by Time Destination   1 : Left fallopian tube and left ovary Fresh Fallopian Tube Nolberto Gomez MD 6/22/2020 1056 Pathology   2 : Right fallopian tube and right ovary Fresh Fallopian Tube Nolberto Gomez MD 6/22/2020 1056 Pathology   1 : Pelvic washings for cytology Fresh Pelvis   Henry Arndt MD 6/22/2020 1048 Cytology     Operative indications:  47 yo  female with a diagnosis of stage IV cervical cancer. She was treated with systemic chemotherapy and had a great response and resolution of metastatic disease. Subsequently received pelvic radiation therapy with an excellent response. Her only suspicious area on PET/CT was in her left adnexa. I recommended laparoscopic evaluation with BSO to assess for residual disease. I did not recommend hysterectomy due to potential risks from the procedure and it is not clinically indicated. Operative findings:  Normal appearing tubes and ovaries. Uterus mobile. Nothing suspicious for malignancy. Radiation changes noted in the pelvis. Fibrosis of the retroperitoneum. Procedure in detail:  After the risks, benefits, indications, and alternatives of the procedure were discussed with the patient and informed consent was obtained, the patient was taken to the operating room. She was positively identified, administered general anesthesia, and then placed in the dorsal lithotomy position in [address] [place] [address]. An exam under anesthesia was performed. She was then prepped and draped in the usual fashion.   A marrufo catheter was inserted, followed by a sponge-stick in the vagina to act as a uterine manipulator. We then proceeded with laparoscopy by grasping the umbilicus on either side with Allis clamps. A small incision was made at the base with an #11-blade scalpel. A 5 mm blade-less trocar was then directly inserted, with the camera in position to visualize safe entry. Once proper placement was confirmed, the abdomen was then insufflated with carbon dioxide. A 12 mm blade-less trocar was inserted in the left lower quadrant and a 5 mm blade-less trocar was inserted in the right lower quadrant, each midway between the anterior superior iliac spine and the umbilicus. These trocars were inserted under direct visualization to ensure safe entry. The abdomen and pelvis were then examined, with the above mentioned findings noted. Pelvic washings were obtained as well. The patient was then placed in Trendelenburg tilt and we then proceeded with the bilateral salpingooophorectomy. We began on the left side. The peritoneum between the round ligament and the ovary was grasped and elevated. It was then incised with the Harmonic Scalpel, allowing entry into the retroperitoneal space where the ureter was identified. The ovarian vessels were then coagulated and transected with the Harmonic scalpel. The ovary was then elevated and the fallopian tube and proper ovarian ligament were then coagulated and transected at the level of the uterine cornua. The adnexa was then removed through the 12 mm trocar site. We then moved to the right side. The peritoneum between the round ligament and the ovary was grasped and elevated. It was then incised with the Harmonic Scalpel, allowing entry into the retroperitoneal space where the ureter was identified. The ovarian vessels were then coagulated and transected with the Harmonic scalpel.   The ovary was then elevated and the fallopian tube and proper ovarian ligament were then coagulated and transected at the level of the uterine cornua. The adnexa was then removed through the 12 mm trocar site. This trocar site was then closed with a 0 Vicryl suture at the level of the fascia using the Jonathan-Klever closure device. Excellent reapproximation and hemostasis was noted on both sides. The pelvis was then irrigated. Once satisfied with hemostasis, the gas was then allowed to escape from the abdomen and the trocars were removed. She was then taken out of Trendelenburg tilt. The incision sites were closed with a stitch of 4-0 Monocryl, followed by a layer of Dermabond. The patient was taken out of [facility], awakened from anesthesia, and taken to the recovery room in stable condition. All instrument, sponge, and needle counts were correct.         Mandie Calvillo MD  6/22/2020  11:03 AM

## 2020-06-22 NOTE — ANESTHESIA PREPROCEDURE EVALUATION
Relevant Problems   No relevant active problems       Anesthetic History   No history of anesthetic complications            Review of Systems / Medical History  Patient summary reviewed, nursing notes reviewed and pertinent labs reviewed    Pulmonary  Within defined limits                 Neuro/Psych   Within defined limits           Cardiovascular  Within defined limits                Exercise tolerance: >4 METS     GI/Hepatic/Renal  Within defined limits              Endo/Other  Within defined limits      Arthritis and cancer     Other Findings   Comments: RA           Physical Exam    Airway  Mallampati: II  TM Distance: > 6 cm  Neck ROM: normal range of motion   Mouth opening: Normal     Cardiovascular  Regular rate and rhythm,  S1 and S2 normal,  no murmur, click, rub, or gallop             Dental  No notable dental hx       Pulmonary  Breath sounds clear to auscultation               Abdominal  GI exam deferred       Other Findings            Anesthetic Plan    ASA: 2  Anesthesia type: general          Induction: Intravenous  Anesthetic plan and risks discussed with: Patient

## 2020-07-09 ENCOUNTER — OFFICE VISIT (OUTPATIENT)
Dept: GYNECOLOGY | Age: 55
End: 2020-07-09

## 2020-07-09 VITALS
DIASTOLIC BLOOD PRESSURE: 84 MMHG | HEART RATE: 81 BPM | HEIGHT: 63 IN | BODY MASS INDEX: 29.95 KG/M2 | WEIGHT: 169 LBS | SYSTOLIC BLOOD PRESSURE: 125 MMHG

## 2020-07-09 DIAGNOSIS — C53.8 MALIGNANT NEOPLASM OF OVERLAPPING SITES OF CERVIX (HCC): Primary | ICD-10-CM

## 2020-07-09 DIAGNOSIS — R59.1 LYMPHADENOPATHY: ICD-10-CM

## 2020-07-09 RX ORDER — AMOXICILLIN 875 MG/1
TABLET, FILM COATED ORAL
COMMUNITY
Start: 2020-07-07 | End: 2020-10-29 | Stop reason: ALTCHOICE

## 2020-07-09 NOTE — PROGRESS NOTES
[address] [facility] [address], [address]  P [phone]  F [phone]    Office Note  Patient ID:  Name:  Adrien Stratton  MRN:  [id number]  :  1965/54 y.o. Date:  2020      HISTORY OF PRESENT ILLNESS:  Adrien Stratton is a 48 y.o.  perimenopausal female who is being seen for at least sever cervical dysplasia. She is referred by Dr. Nahomy Post. Her most recent pap smear was read as HGSIL and she was high-risk HPV positive. She underwent subsequent colposcopy with biopsy. This revealed ZBIGNIEW 2-3, but an invasive process could not be excluded. On a pelvic ultrasound she was noted to have a complex 4.1 cm right adnexal mass and a 3.7 cm complex mass in the cervix. I have been asked to see her in consultation for further evaluation and management. I recommended an exam under anesthesia with cervical biopsy, cystoscopy, proctoscopy. This was performed on 19. Operative findings:  Replacement of cervix with carcinoma, with extension into proximal anterior vagina.  Parametrial thickening bilaterally.  Uterus still somewhat mobile.  No appreciable disease in the bladder or rectum. FINAL PATHOLOGIC DIAGNOSIS   Cervix, biopsy:   Invasive squamous cell carcinoma   Comment   The case is also seen by Dr. Claudette Aas who concurs with the findings. The results are communicated with Dr. Kelley Comes nurse, Bertha Fuller at approximately 1:30pm on 2019. Following the procedure and confirmation of the diagnosis of cancer, I sent her for a PET/CT. This demonstrated stage IVB disease. I explained that she has metastatic disease and she is not a candidate for surgical resection or curative radiation therapy. I discussed with them that the standard of care would be systemic chemotherapy, consisting of Taxol/Cisplatin/Avastin. If she has a good response, we may reconsider pelvic radiation for local control.   It may also be necessary for control of bleeding, if that becomes a significant issue. She completed 8 cycles of Taxol/Cisplatin/Avastin chemotherapy and tolerated well. She responded very well to treatment. The supraclavicular node had resolved completely and her retroperitoneal adenopathy had markedly improved. Her primary tumor had also shrunk significantly. I recommended pelvic radiation to provide local control. Since she lives in [facility] I referred her there for treatment. She subsequently was referred to radiation oncology at [facility] for brachytherapy. She completed all of her therapy in March 2020. She had not had any imaging since that time when she presented for follow-up. She reported some mild flank tenderness on the right, but nothing else. No vaginal bleeding, but some discharge. I sent her for a PET/CT to evaluate for persistent disease. There was no hypermetabolic activity remaining in the cervix or the previously identified nodes, however, there were two spots of activity in the left ovary. I recommended laparoscopic evaluation with BSO. She was taken to the OR on 6/22/20 for that procedure. Operative findings:  Normal appearing tubes and ovaries. Uterus mobile. Nothing suspicious for malignancy. Radiation changes noted in the pelvis. Fibrosis of the retroperitoneum. FINAL PATHOLOGIC DIAGNOSIS   1. 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 Ovary and fallopian tube, right salpingo-oophorectomy:   Serous cystadenoma   Comment   There are one or two metastatic deposits of squamous cell carcinoma within the parenchyma of the left ovary. No surface involvement is identified.      CYTOLOGIC INTERPRETATION   Pelvic Washing, ThinPrep:   Benign mesothelial cells and mixed inflammatory cells   General Categorization   No cells diagnostic for malignancy   Specimen Adequacy   Satisfactory for evaluation     She presents today for her postoperative examination. She is recovering well. ROS:   and GI review:  Negative  Cardiopulmonary review:  Negative   Musculoskeletal:  Negative    A comprehensive review of systems was negative except for that written in the History of Present Illness. , 10 point ROS      OB/GYN ROS:  There is no history of significant gyn problems or procedures. 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 ([facility] 2019    Hydroureter, right 2019    Chemotherapy induced neutropenia ([facility] 2019    Malignant neoplasm of overlapping sites of cervix ([facility] 2019    Complex cyst of right ovary 2019    Cervical cancer ([facility] 2019     PMH:  Past Medical History:   Diagnosis Date    Cancer ([facility]     CERVICAL    Cervical cancer ([facility] 2019    Rheumatoid arthritis (HCC)       PSH:  Past Surgical History:   Procedure Laterality Date    HX  SECTION      HX COLPOSCOPY  2019    HGSIL      Social History:  Social History     Tobacco Use    Smoking status: Former Smoker     Packs/day: 0.50     Years: 2.00     Pack years: 1.00     Last attempt to quit: 1991     Years since quittin.1    Smokeless tobacco: Never Used   Substance Use Topics    Alcohol use: Yes     Comment: OCCASIONALLY 1/MONTH      Family History:  Family History   Problem Relation Age of Onset    No Known Problems Mother     Other Father         ACCIDENTAL DEATH    Thyroid Disease Brother     Anesth Problems Neg Hx       Medications: (reviewed)  Current Outpatient Medications   Medication Sig    amoxicillin (AMOXIL) 875 mg tablet     multivitamin (ONE A DAY) tablet Take 1 Tab by mouth daily.  vitamin e (E GEMS) 1,000 unit capsule Take 1,000 Units by mouth daily.  ferrous sulfate 220 mg (44 mg iron)/5 mL solution Take  by mouth daily.     lidocaine-prilocaine (EMLA) topical cream Apply small amount over port area one hour before chemo treatment and cover with a Band-Aid    OTHER \"MANY VITAMINS AND SUPPLEMENTS\"    ibuprofen (ADVIL) 200 mg tablet Take 400 mg by mouth every eight (8) hours as needed for Pain. No current facility-administered medications for this visit. Allergies: (reviewed)  No Known Allergies       OBJECTIVE:    Physical Exam:  VITAL SIGNS: Vitals:    07/09/20 1115   BP: 125/84   Pulse: 81   Weight: 169 lb (76.7 kg)   Height: 5' 3\" (1.6 m)     Body mass index is 29.94 kg/m². GENERAL FAUSTINA: Conversant, alert, oriented. No acute distress. HEENT: HEENT. No thyroid enlargement. No JVD. Neck: Supple without restrictions. RESPIRATORY: Clear to auscultation and percussion to the bases. No CVAT. CARDIOVASC: RRR without murmur/rub. GASTROINT: soft, non-tender, without masses or organomegaly   MUSCULOSKEL: no joint tenderness, deformity or swelling   EXTREMITIES: extremities normal, atraumatic, no cyanosis or edema   PELVIC: Normal external genitalia. Normal distal vagina. Cervix flush with vaginal apex. No visible tumor. No firmness or nodularity. No appreciable parametrial thickening. She was not tender on exam.   RECTAL: Deferred   KEIRY SURVEY: No suspicious lymphadenopathy or edema noted. NEURO: Grossly intact. No acute deficit.        Lab Data:    Lab Results   Component Value Date/Time    WBC 3.0 (L) 06/10/2020 11:26 AM    HGB 10.4 (L) 06/10/2020 11:26 AM    HCT 32.5 (L) 06/10/2020 11:26 AM    PLATELET 570 17/57/6245 11:26 AM    MCV 95.0 06/10/2020 11:26 AM     Lab Results   Component Value Date/Time    Sodium 141 06/10/2020 11:26 AM    Potassium 4.1 06/10/2020 11:26 AM    Chloride 107 06/10/2020 11:26 AM    CO2 28 06/10/2020 11:26 AM    Anion gap 6 06/10/2020 11:26 AM    Glucose 86 06/10/2020 11:26 AM    BUN 9 06/10/2020 11:26 AM    Creatinine 0.51 (L) 06/10/2020 11:26 AM    BUN/Creatinine ratio 18 06/10/2020 11:26 AM    GFR est AA >60 06/10/2020 11:26 AM    GFR est non-AA >60 06/10/2020 11:26 AM    Calcium 8.9 06/10/2020 11:26 AM         PET/CT (5/21/19)  HEAD/NECK: There is 1.8 cm left level 4 lymph node with increased metabolic  activity of 11.     CHEST: No foci of abnormal hypermetabolism. Low-grade activity in a normal right  axillary lymph node is most likely physiologic.     ABDOMEN/PELVIS: There is a 1 cm retrocrural lymph node on the right with  increased metabolic activity of 5.      There is periaortic adenopathy measuring 2 cm with increased metabolic activity  of 9.     There is an enlarged lymph node at the aortic bifurcation with increased  metabolic activity.       There is a complex appearing mass right pelvic sidewall measuring 3.6 cm with  increased metabolic activity of 59.2. There is left iliac adenopathy with increased metabolic activity of 5.7.     There is a 1.4 cm soft tissue nodule intraperitoneal left lower quadrant with  increased metabolic activity of 7.     There is a mass in the cervix with increased metabolic activity of 12  There is right hydronephrosis.     SKELETON: No foci of abnormal hypermetabolism in the axial and visualized  appendicular skeleton.     IMPRESSION:   There is increased metabolic activity in a cervical mass consistent with  known primary neoplasm. There is adenopathy involving right pelvic sidewall, left iliac chain,  para-aortic chain, intra-abdominal nodule left lower quadrant, retrocrural lymph  node and left supra clavicular lymph node with increased metabolic activity  consistent with metastatic disease. The cervical mass appears to be obstructing  the right ureter resulting in moderate right hydroureteronephrosis. CT of chest/abdomen/pelvis (8/13/19)  CT chest:    With a left chest Port-A-Cath terminates in the SVC. The visualized thyroid  gland is unremarkable. The aorta and main pulmonary artery are normal in  caliber.  The heart size is normal.  There is no pericardial or pleural effusion.        There are no enlarged axillary, mediastinal, or hilar lymph nodes.      There is no lung mass or airspace opacity. There is no pneumothorax. The  central airways are clear.     CT abdomen and pelvis: The liver, spleen, pancreas, and adrenal glands are normal. The gall bladder is  present  without intra- or extra-hepatic biliary dilatation.       The kidneys are symmetric without hydronephrosis.      There are no dilated bowel loops. The appendix is normal.       There are no enlarged lymph nodes. There is no free fluid or free air. The  aorta tapers without aneurysm.     The urinary bladder is normal.  There is no pelvic mass. Dominant bilateral  ovarian follicles are noted.     There is degenerative change at L5-S1. There is no aggressive bony lesion.     IMPRESSION:   No evidence for metastatic disease or other acute abnormality in the chest,  abdomen, or pelvis. CT of chest/abdomen/pelvis (10/9/19)  Chest: No thoracic lymphadenopathy. Specifically, the right retrocrural and left  supraclavicular lymph node seen on prior PET/CT are no longer present. A few  subcentimeter foci of subpleural atelectasis are noted in the lower lobes. The  lungs are otherwise clear. The central airways are patent. No pneumothorax or  pleural effusion. The heart size is normal. A left subclavian ported catheter  terminates in the superior SVC. Trace fluid in the mid esophagus (3-18) suggests  gastroesophageal reflux disease. The T4-T5 disc space is fused anteriorly  (147-01).    Abdomen: No abdominal lymphadenopathy. The stomach, duodenum, liver,  gallbladder, pancreas, spleen, adrenals, and kidneys are normal.     Cervical carcinoma: The primary tumor is not visible. Pelvic malgorzata metastases  have continued to decrease in size.  The largest, a conglomerate mass of multiple  nodes posterior to the right external iliac vessels, now measures 23 mm in short  axis and 23 x 30 x 45 mm in 3 dimensions, versus 23 x 34 x 49 mm on 8/12/2019  and 42 x 53 x 75 mm on 5/21/2019. Rim calcification around the treated  metastasis has increased from 8/12/2019. A left external iliac malgorzata metastasis  has also decreased, and measures 17 mm in short axis today, versus 21 mm on  8/12/2019. Bilateral, iliac, and aortocaval malgorzata metastases have also decreased  from 8/12/2019 and are no longer pathologically enlarged. Peritoneal  carcinomatosis, visible as left lower quadrant omental nodules on PET/CT, is no  longer visible. No overt peritoneal or omental nodularity. No ascites.     Pelvis: The cecum is mobile in the right upper quadrant, a normal variant. The  small bowel, ileocecal junction, appendix, colon, and bladder are normal. No  free air.     IMPRESSION:  1. Further decrease in pelvic and retroperitoneal malgorzata metastases. 2. No visible, residual, peritoneal carcinomatosis. 3. No metastases in the chest. Retrocrural and supraclavicular malgorzata metastases  are no longer visible. CT of chest/abdomen/pelvis (11/22/19)  THYROID: No nodule. MEDIASTINUM: Left subclavian port catheter terminates in the superior aspect of  the SVC/junction of the left brachycephalic vein. No mediastinal adenopathy. YNES: No mass or lymphadenopathy. THORACIC AORTA: No dissection or aneurysm. MAIN PULMONARY ARTERY: Normal in caliber. TRACHEA/BRONCHI: Patent. ESOPHAGUS: No wall thickening or dilatation. HEART: Normal in size. PLEURA: No effusion or pneumothorax. LUNGS: No nodule, mass, or airspace disease. LIVER: No mass or biliary dilatation. GALLBLADDER: Unremarkable. SPLEEN: No mass. PANCREAS: No mass or ductal dilatation. ADRENALS: Unremarkable. KIDNEYS: No mass, calculus, or hydronephrosis. STOMACH: Unremarkable. SMALL BOWEL: No dilatation or wall thickening. COLON: No dilatation or wall thickening. APPENDIX: Nonvisualized  PERITONEUM: No ascites or mesenteric lymphadenopathy.  No omental nodularity. RETROPERITONEUM: Redemonstration of para-aortic lymph nodes which are prominent  including a left common iliac lymph node measuring 6 mm in short axis and the  right common iliac lymph node measuring 11 mm in short axis (3:88). The right  common iliac lymph node is stable in size. The dominant lymph node in the right  external iliac chain measures 21 mm in short axis, previously 23 mm. The left  external iliac lymph node measures 15 mm in short axis, previously 16 mm. REPRODUCTIVE ORGANS: Uterus and adnexal structures are present and grossly  normal.  URINARY BLADDER: No mass or calculus. BONES: Unchanged millimetric benign-appearing sclerotic focus in the right  scapula. No aggressive osseous lesions. ADDITIONAL COMMENTS: N/A     IMPRESSION:  1.  Stable size of retroperitoneal and pelvic sidewall lymph nodes compared to  October 9, 2019. While a few of these lymph nodes measure 1 to 2 mm less in  size, this does not reflect a significant change. 2.  No evidence of intrathoracic metastasis. Pelvic MRI (2/27/20) - [facility]  Uterus:  No fibroids. Endometrium is grossly unremarkable. There is a Miah sleeve which terminates in the endometrial canal.     Cervix:  The cervix is small and contains a Nabothian cyst. There is abnormal signal in the patients cervix seen anteriorly and along the right side of the cervix. There is no large cervical mass. The anterior paracervical fat is hazy, it is unclear if this is related to treatment effect. Ovaries: There is a subcentimeter cyst of the right ovary the left ovary is mildly full but appears morphologically similar when compared with the prior exam.    Vagina:  Collapsed. Lymph nodes: There is persistent pathologic bilateral pelvic adenopathy, but it is improving. On the prior exam there was a right external iliac/obturator lymph node which measured at least 3.5 cm short axis and currently measures closer to 2.1 cm.  There is a left external iliac chain lymph node which measured at least 2.2 cm short axis and currently measures approximately 1.4 cm short axis. There is no worsening adenopathy. Of note, the field of view this exam does not extend above the aortic bifurcation. Bladder:  Collapsed and trabeculated. IMPRESSION:  1. Probable residual tumor along the right side and anterior portion of the cervix, as described above. Absence of similar prior exam and absence of postcontrast sequences does compromise comparison assessment. Interpretation was made within this caveat. 2.  Improving pelvic adenopathy. 3.  Remainder as above      PET/CT (5/28/20)  HEAD/NECK: No apparent foci of abnormal hypermetabolism. Cerebral evaluation is  limited by normal intense activity. Activity in the left supraclavicular lymph  node has resolved.     CHEST: No foci of abnormal hypermetabolism. Retrocrural adenopathy has resolved. Low-grade activity in the axilla lymph nodes is most likely reactive     ABDOMEN/PELVIS: Retroperitoneal activity has resolved. Pelvic adenopathy has  resolved. There is a calcified right pelvic sidewall lymph node.     There are 2 small foci of increased activity with SUV of 13.9 which which are  within the left ovary     SKELETON: No foci of abnormal hypermetabolism in the axial and visualized  appendicular skeleton.     IMPRESSION:   1. Increased metabolic activity in bilateral pelvic adenopathy, retroperitoneal  adenopathy and left supraclavicular adenopathy has resolved.     There are 2 small foci of increased metabolic activity which correspond to a  normal sized left ovary of uncertain significance and most likely are  physiologic although small metastatic foci are not entirely excluded and  continued close follow-up would be helpful. IMPRESSION/PLAN:  Aleena Cortez is a 48 y.o. 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   She was treated with 8 cycles of Taxol/Cistplatin/Avastin chemotherapy and had a great response. She was then treated with pelvic radiation therapy, including brachytherapy. She has had an excellent response to her radiation therapy, with no obvious disease remaining on exam.  A PET demonstrated resolution of her previously noted disease, however, there were two spots of activity in the left ovary. She is s/p c BSO, revealing a focus of metastatic disease within the left ovary. I reviewed the pathology with my partner, Dr. Patrica Gonsalves. He and I both agree that no additional treatment is indicated at this time, however, she remains at risk for recurrence and will need close surveillance. I recommended repeating a CT scan in 3 months. I will see her back after the scan to review the results.         Signed By: Gerri Deleon MD     7/9/2020/9:50 AM

## 2020-07-16 RX ORDER — SODIUM CHLORIDE 0.9 % (FLUSH) 0.9 %
5-10 SYRINGE (ML) INJECTION AS NEEDED
Status: CANCELLED | OUTPATIENT
Start: 2020-07-27

## 2020-07-16 RX ORDER — HEPARIN 100 UNIT/ML
500 SYRINGE INTRAVENOUS AS NEEDED
Status: CANCELLED | OUTPATIENT
Start: 2020-07-27

## 2020-07-16 RX ORDER — SODIUM CHLORIDE 9 MG/ML
10 INJECTION INTRAMUSCULAR; INTRAVENOUS; SUBCUTANEOUS AS NEEDED
Status: CANCELLED | OUTPATIENT
Start: 2020-07-27

## 2020-07-23 ENCOUNTER — HOSPITAL ENCOUNTER (OUTPATIENT)
Dept: INFUSION THERAPY | Age: 55
End: 2020-07-23

## 2020-07-28 ENCOUNTER — TELEPHONE (OUTPATIENT)
Dept: GYNECOLOGY | Age: 55
End: 2020-07-28

## 2020-08-04 ENCOUNTER — TELEPHONE (OUTPATIENT)
Dept: GYNECOLOGY | Age: 55
End: 2020-08-04

## 2020-08-06 RX ORDER — SODIUM CHLORIDE 9 MG/ML
10 INJECTION INTRAMUSCULAR; INTRAVENOUS; SUBCUTANEOUS AS NEEDED
Status: CANCELLED | OUTPATIENT
Start: 2020-08-13

## 2020-08-06 RX ORDER — SODIUM CHLORIDE 0.9 % (FLUSH) 0.9 %
5-10 SYRINGE (ML) INJECTION AS NEEDED
Status: CANCELLED | OUTPATIENT
Start: 2020-08-13

## 2020-08-06 RX ORDER — HEPARIN 100 UNIT/ML
500 SYRINGE INTRAVENOUS AS NEEDED
Status: CANCELLED | OUTPATIENT
Start: 2020-08-13

## 2020-08-13 ENCOUNTER — HOSPITAL ENCOUNTER (OUTPATIENT)
Dept: INFUSION THERAPY | Age: 55
Discharge: HOME | End: 2020-08-13
Payer: COMMERCIAL

## 2020-08-13 VITALS
HEART RATE: 71 BPM | SYSTOLIC BLOOD PRESSURE: 111 MMHG | RESPIRATION RATE: 18 BRPM | TEMPERATURE: 97.4 F | DIASTOLIC BLOOD PRESSURE: 67 MMHG

## 2020-08-13 DIAGNOSIS — C77.9 METASTATIC SQUAMOUS CELL CARCINOMA TO LYMPH NODE (HCC): Primary | ICD-10-CM

## 2020-08-13 PROCEDURE — 77030012965 HC NDL HUBR BBMI -A

## 2020-08-13 PROCEDURE — 96523 IRRIG DRUG DELIVERY DEVICE: CPT

## 2020-08-13 PROCEDURE — 74011250636 HC RX REV CODE- 250/636: Performed by: OBSTETRICS & GYNECOLOGY

## 2020-08-13 RX ORDER — HEPARIN 100 UNIT/ML
500 SYRINGE INTRAVENOUS AS NEEDED
Status: ACTIVE | OUTPATIENT
Start: 2020-08-13 | End: 2020-08-13

## 2020-08-13 RX ORDER — SODIUM CHLORIDE 9 MG/ML
10 INJECTION INTRAMUSCULAR; INTRAVENOUS; SUBCUTANEOUS AS NEEDED
Status: ACTIVE | OUTPATIENT
Start: 2020-08-13 | End: 2020-08-13

## 2020-08-13 RX ORDER — SODIUM CHLORIDE 0.9 % (FLUSH) 0.9 %
5-10 SYRINGE (ML) INJECTION AS NEEDED
Status: DISPENSED | OUTPATIENT
Start: 2020-08-13 | End: 2020-08-13

## 2020-08-13 RX ADMIN — Medication 10 ML: at 11:14

## 2020-08-13 RX ADMIN — HEPARIN 500 UNITS: 100 SYRINGE at 11:14

## 2020-08-13 RX ADMIN — SODIUM CHLORIDE 10 ML: 9 INJECTION INTRAMUSCULAR; INTRAVENOUS; SUBCUTANEOUS at 11:14

## 2020-08-13 NOTE — PROGRESS NOTES
OPIC Short Note                       Date: 2020    Name: Malcolm Mccray    MRN: [id number]         : 1965      1030 Pt admit to [facility] for port flush ambulatory in stable condition. Ms. Margi Llanos vitals were reviewed prior to and after treatment. Patient Vitals for the past 12 hrs:   Temp Pulse Resp BP   20 1045 97.4 °F (36.3 °C) 71 18 111/67     Medications given:   Medications Administered     0.9% sodium chloride injection 10 mL     Admin Date  2020 Action  Given Dose  10 mL Route  IntraVENous Administered By  Alyssa Grace RN          heparin (porcine) pf 500 Units     Admin Date  2020 Action  Given Dose  500 Units Route  IntraVENous Administered By  Alyssa Grace RN          sodium chloride (NS) flush 5-10 mL     Admin Date  2020 Action  Given Dose  10 mL Route  IntraVENous Administered By  Alyssa Grace RN                Port accessed with positive blood return. Port flushed, heparinized and de-accessed per protocol.      Ms. Nam Salazar was discharged from [place] [address] in stable condition at 1045    Future Appointments   Date Time Provider Juan Manuel Tello   10/8/2020 10:30  [address] [facility] [address] ALEIDA'KALEY IVEY   12/3/2020 10:30 AM NEHEMIAH Colon RN  2020

## 2020-09-17 ENCOUNTER — APPOINTMENT (OUTPATIENT)
Dept: INFUSION THERAPY | Age: 55
End: 2020-09-17

## 2020-10-05 RX ORDER — SODIUM CHLORIDE 0.9 % (FLUSH) 0.9 %
5-10 SYRINGE (ML) INJECTION AS NEEDED
Status: CANCELLED | OUTPATIENT
Start: 2020-10-08

## 2020-10-05 RX ORDER — SODIUM CHLORIDE 9 MG/ML
10 INJECTION INTRAMUSCULAR; INTRAVENOUS; SUBCUTANEOUS AS NEEDED
Status: CANCELLED | OUTPATIENT
Start: 2020-10-08

## 2020-10-05 RX ORDER — HEPARIN 100 UNIT/ML
500 SYRINGE INTRAVENOUS AS NEEDED
Status: CANCELLED | OUTPATIENT
Start: 2020-10-08

## 2020-10-08 ENCOUNTER — HOSPITAL ENCOUNTER (OUTPATIENT)
Dept: INFUSION THERAPY | Age: 55
Discharge: HOME | End: 2020-10-08
Payer: COMMERCIAL

## 2020-10-08 VITALS — TEMPERATURE: 97.5 F | DIASTOLIC BLOOD PRESSURE: 67 MMHG | HEART RATE: 69 BPM | SYSTOLIC BLOOD PRESSURE: 103 MMHG

## 2020-10-08 DIAGNOSIS — C77.9 METASTATIC SQUAMOUS CELL CARCINOMA TO LYMPH NODE (HCC): Primary | ICD-10-CM

## 2020-10-08 PROCEDURE — 96523 IRRIG DRUG DELIVERY DEVICE: CPT

## 2020-10-08 PROCEDURE — 77030012965 HC NDL HUBR BBMI -A

## 2020-10-08 PROCEDURE — 74011250636 HC RX REV CODE- 250/636: Performed by: PHYSICIAN ASSISTANT

## 2020-10-08 RX ORDER — HEPARIN 100 UNIT/ML
500 SYRINGE INTRAVENOUS AS NEEDED
Status: ACTIVE | OUTPATIENT
Start: 2020-10-08 | End: 2020-10-08

## 2020-10-08 RX ORDER — SODIUM CHLORIDE 9 MG/ML
10 INJECTION INTRAMUSCULAR; INTRAVENOUS; SUBCUTANEOUS AS NEEDED
Status: ACTIVE | OUTPATIENT
Start: 2020-10-08 | End: 2020-10-08

## 2020-10-08 RX ORDER — SODIUM CHLORIDE 0.9 % (FLUSH) 0.9 %
5-10 SYRINGE (ML) INJECTION AS NEEDED
Status: DISPENSED | OUTPATIENT
Start: 2020-10-08 | End: 2020-10-08

## 2020-10-08 RX ADMIN — HEPARIN 500 UNITS: 100 SYRINGE at 10:30

## 2020-10-08 RX ADMIN — Medication 10 ML: at 10:30

## 2020-10-08 NOTE — PROGRESS NOTES
OPIC Short Note                       Date: October 8, 2020  1030 Pt admit to [facility] for port flush ambulatory in stable condition. Assessment completed. No new concerns voiced. Port accessed without difficulty, good blood return, port was flushed, heparinized and de-accessed without difficulty. Ms. Yoni Benites vitals were reviewed prior to and after treatment. Patient Vitals for the past 12 hrs:   Temp Pulse BP   10/08/20 1028 97.5 °F (36.4 °C) 69 103/67       Medications Administered     heparin (porcine) pf 500 Units     Admin Date  10/08/2020 Action  Given Dose  500 Units Route  IntraVENous Administered By  Issa Soni, CLARITA          sodium chloride (NS) flush 5-10 mL     Admin Date  10/08/2020 Action  Given Dose  10 mL Route  IntraVENous Administered By  Issa Soin RN              Ms. Lilly Mack was discharged from [place] [address] in stable condition at [phone].  Pt is aware of next appointment on 12/3/2020 at 10:30am.      Future Appointments   Date Time Provider Juan Manuel Tello   12/3/2020 10:30 AM H2 [id number] Erik Barber RN  October 8, 2020

## 2020-10-29 ENCOUNTER — OFFICE VISIT (OUTPATIENT)
Dept: GYNECOLOGY | Age: 55
End: 2020-10-29
Payer: COMMERCIAL

## 2020-10-29 ENCOUNTER — TELEPHONE (OUTPATIENT)
Dept: GYNECOLOGY | Age: 55
End: 2020-10-29

## 2020-10-29 VITALS
DIASTOLIC BLOOD PRESSURE: 84 MMHG | HEIGHT: 63 IN | SYSTOLIC BLOOD PRESSURE: 118 MMHG | BODY MASS INDEX: 29.62 KG/M2 | HEART RATE: 85 BPM | WEIGHT: 167.2 LBS

## 2020-10-29 DIAGNOSIS — C53.8 MALIGNANT NEOPLASM OF OVERLAPPING SITES OF CERVIX (HCC): Primary | ICD-10-CM

## 2020-10-29 PROCEDURE — 99214 OFFICE O/P EST MOD 30 MIN: CPT | Performed by: OBSTETRICS & GYNECOLOGY

## 2020-10-29 NOTE — PROGRESS NOTES
[address] [place] [address] Mathias Moritz [address], [address] [facility]  P [phone]  F [phone]    Office Note  Patient ID:  Name:  Micky Amaro  MRN:  [id number]  :  1965/55 y.o. Date:  10/29/2020      HISTORY OF PRESENT ILLNESS:  Micky Amaro is a 50 y.o.  perimenopausal female who is being seen for at least sever cervical dysplasia. She is referred by Dr. Chadd Ryan. Her most recent pap smear was read as HGSIL and she was high-risk HPV positive. She underwent subsequent colposcopy with biopsy. This revealed ZBIGNIEW 2-3, but an invasive process could not be excluded. On a pelvic ultrasound she was noted to have a complex 4.1 cm right adnexal mass and a 3.7 cm complex mass in the cervix. I have been asked to see her in consultation for further evaluation and management. I recommended an exam under anesthesia with cervical biopsy, cystoscopy, proctoscopy. This was performed on 19. Operative findings:  Replacement of cervix with carcinoma, with extension into proximal anterior vagina.  Parametrial thickening bilaterally.  Uterus still somewhat mobile.  No appreciable disease in the bladder or rectum. FINAL PATHOLOGIC DIAGNOSIS   Cervix, biopsy:   Invasive squamous cell carcinoma   Comment   The case is also seen by Dr. Akosua Benito who concurs with the findings. The results are communicated with Dr. Isa Rivera nurse, Osiel Thompson at approximately 1:30pm on 2019. Following the procedure and confirmation of the diagnosis of cancer, I sent her for a PET/CT. This demonstrated stage IVB disease. I explained that she has metastatic disease and she is not a candidate for surgical resection or curative radiation therapy. I discussed with them that the standard of care would be systemic chemotherapy, consisting of Taxol/Cisplatin/Avastin. If she has a good response, we may reconsider pelvic radiation for local control.   It may also be necessary for control of bleeding, if that becomes a significant issue. She completed 8 cycles of Taxol/Cisplatin/Avastin chemotherapy and tolerated well. She responded very well to treatment. The supraclavicular node had resolved completely and her retroperitoneal adenopathy had markedly improved. Her primary tumor had also shrunk significantly. I recommended pelvic radiation to provide local control. Since she lives in [address] [place] [address] I referred her there for treatment. She subsequently was referred to radiation oncology at [facility] for brachytherapy. She completed all of her therapy in March 2020. She had not had any imaging since that time when she presented for follow-up. She reported some mild flank tenderness on the right, but nothing else. No vaginal bleeding, but some discharge. I sent her for a PET/CT to evaluate for persistent disease. There was no hypermetabolic activity remaining in the cervix or the previously identified nodes, however, there were two spots of activity in the left ovary. I recommended laparoscopic evaluation with BSO. She was taken to the OR on 6/22/20 for that procedure. Operative findings:  Normal appearing tubes and ovaries. Uterus mobile. Nothing suspicious for malignancy. Radiation changes noted in the pelvis. Fibrosis of the retroperitoneum. FINAL PATHOLOGIC DIAGNOSIS   1. 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 Ovary and fallopian tube, right salpingo-oophorectomy:   Serous cystadenoma   Comment   There are one or two metastatic deposits of squamous cell carcinoma within the parenchyma of the left ovary. No surface involvement is identified.       CYTOLOGIC INTERPRETATION   Pelvic Washing, ThinPrep:   Benign mesothelial cells and mixed inflammatory cells   General Categorization   No cells diagnostic for malignancy   Specimen Adequacy   Satisfactory for evaluation     She presented for her postoperative examination. I reviewed the pathology with my partner, Dr. Edilia Martinez. He and I both agreed that no additional treatment was indicated, however, she remains at risk for recurrence and will need close surveillance. I recommended repeating a CT scan in 3 months. She presents today for those results. ROS:   and GI review:  Negative  Cardiopulmonary review:  Negative   Musculoskeletal:  Negative    A comprehensive review of systems was negative except for that written in the History of Present Illness. , 10 point ROS      OB/GYN ROS:  There is no history of significant gyn problems or procedures.       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 ([facility] [address] 2019    Hydroureter, right 2019    Chemotherapy induced neutropenia ([address] 2019    Malignant neoplasm of overlapping sites of cervix ([facility] [address] 2019    Complex cyst of right ovary 2019    Cervical cancer ([facility] [address] 2019     PMH:  Past Medical History:   Diagnosis Date    Cancer ([address]     CERVICAL    Cervical cancer ([address] 2019    Rheumatoid arthritis (HCC)       PSH:  Past Surgical History:   Procedure Laterality Date    HX  SECTION      HX COLPOSCOPY  2019    HGSIL      Social History:  Social History     Tobacco Use    Smoking status: Former Smoker     Packs/day: 0.50     Years: 2.00     Pack years: 1.00     Last attempt to quit: 1991     Years since quittin.4    Smokeless tobacco: Never Used   Substance Use Topics    Alcohol use: Yes     Comment: OCCASIONALLY 1/MONTH      Family History:  Family History   Problem Relation Age of Onset    No Known Problems Mother     Other Father         ACCIDENTAL DEATH    Thyroid Disease Brother     Anesth Problems Neg Hx       Medications: (reviewed)  Current Outpatient Medications   Medication Sig    multivitamin (ONE A DAY) tablet Take 1 Tab by mouth daily.  vitamin e (E GEMS) 1,000 unit capsule Take 1,000 Units by mouth daily.  ferrous sulfate 220 mg (44 mg iron)/5 mL solution Take  by mouth daily.  lidocaine-prilocaine (EMLA) topical cream Apply small amount over port area one hour before chemo treatment and cover with a Band-Aid    OTHER \"MANY VITAMINS AND SUPPLEMENTS\"    ibuprofen (ADVIL) 200 mg tablet Take 400 mg by mouth every eight (8) hours as needed for Pain. No current facility-administered medications for this visit. Allergies: (reviewed)  No Known Allergies       OBJECTIVE:    Physical Exam:  VITAL SIGNS: Vitals:    10/29/20 1342   BP: 118/84   Pulse: 85   Weight: 167 lb 3.2 oz (75.8 kg)   Height: 5' 2.99\" (1.6 m)     Body mass index is 29.63 kg/m². GENERAL FAUSTINA: Conversant, alert, oriented. No acute distress. HEENT: HEENT. No thyroid enlargement. No JVD. Neck: Supple without restrictions. RESPIRATORY: Clear to auscultation and percussion to the bases. No CVAT. CARDIOVASC: RRR without murmur/rub. GASTROINT: soft, non-tender, without masses or organomegaly   MUSCULOSKEL: no joint tenderness, deformity or swelling   EXTREMITIES: extremities normal, atraumatic, no cyanosis or edema   PELVIC: Normal external genitalia. Normal distal vagina. Cervix flush with vaginal apex. No visible tumor. No firmness or nodularity. No appreciable parametrial thickening. She was not tender on exam.   RECTAL: Deferred   KEIRY SURVEY: No suspicious lymphadenopathy or edema noted. NEURO: Grossly intact. No acute deficit.        Lab Data:    Lab Results   Component Value Date/Time    WBC 3.0 (L) 06/10/2020 11:26 AM    HGB 10.4 (L) 06/10/2020 11:26 AM    HCT 32.5 (L) 06/10/2020 11:26 AM    PLATELET 009 36/70/5821 11:26 AM    MCV 95.0 06/10/2020 11:26 AM     Lab Results   Component Value Date/Time    Sodium 141 06/10/2020 11:26 AM    Potassium 4.1 06/10/2020 11:26 AM    Chloride 107 06/10/2020 11:26 AM    CO2 28 06/10/2020 11:26 AM    Anion gap 6 06/10/2020 11:26 AM    Glucose 86 06/10/2020 11:26 AM    BUN 9 06/10/2020 11:26 AM    Creatinine 0.51 (L) 06/10/2020 11:26 AM    BUN/Creatinine ratio 18 06/10/2020 11:26 AM    GFR est AA >60 06/10/2020 11:26 AM    GFR est non-AA >60 06/10/2020 11:26 AM    Calcium 8.9 06/10/2020 11:26 AM         PET/CT (5/21/19)  HEAD/NECK: There is 1.8 cm left level 4 lymph node with increased metabolic  activity of 11.     CHEST: No foci of abnormal hypermetabolism. Low-grade activity in a normal right  axillary lymph node is most likely physiologic.     ABDOMEN/PELVIS: There is a 1 cm retrocrural lymph node on the right with  increased metabolic activity of 5.      There is periaortic adenopathy measuring 2 cm with increased metabolic activity  of 9.     There is an enlarged lymph node at the aortic bifurcation with increased  metabolic activity.       There is a complex appearing mass right pelvic sidewall measuring 3.6 cm with  increased metabolic activity of 65.8. There is left iliac adenopathy with increased metabolic activity of 5.7.     There is a 1.4 cm soft tissue nodule intraperitoneal left lower quadrant with  increased metabolic activity of 7.     There is a mass in the cervix with increased metabolic activity of 12  There is right hydronephrosis.     SKELETON: No foci of abnormal hypermetabolism in the axial and visualized  appendicular skeleton.     IMPRESSION:   There is increased metabolic activity in a cervical mass consistent with  known primary neoplasm. There is adenopathy involving right pelvic sidewall, left iliac chain,  para-aortic chain, intra-abdominal nodule left lower quadrant, retrocrural lymph  node and left supra clavicular lymph node with increased metabolic activity  consistent with metastatic disease. The cervical mass appears to be obstructing  the right ureter resulting in moderate right hydroureteronephrosis.       CT of chest/abdomen/pelvis (8/13/19)  CT chest:    With a left chest Port-A-Cath terminates in the SVC. The visualized thyroid  gland is unremarkable. The aorta and main pulmonary artery are normal in  caliber. The heart size is normal.  There is no pericardial or pleural effusion.        There are no enlarged axillary, mediastinal, or hilar lymph nodes.      There is no lung mass or airspace opacity. There is no pneumothorax. The  central airways are clear.     CT abdomen and pelvis: The liver, spleen, pancreas, and adrenal glands are normal. The gall bladder is  present  without intra- or extra-hepatic biliary dilatation.       The kidneys are symmetric without hydronephrosis.      There are no dilated bowel loops. The appendix is normal.       There are no enlarged lymph nodes. There is no free fluid or free air. The  aorta tapers without aneurysm.     The urinary bladder is normal.  There is no pelvic mass. Dominant bilateral  ovarian follicles are noted.     There is degenerative change at L5-S1. There is no aggressive bony lesion.     IMPRESSION:   No evidence for metastatic disease or other acute abnormality in the chest,  abdomen, or pelvis. CT of chest/abdomen/pelvis (10/9/19)  Chest: No thoracic lymphadenopathy. Specifically, the right retrocrural and left  supraclavicular lymph node seen on prior PET/CT are no longer present. A few  subcentimeter foci of subpleural atelectasis are noted in the lower lobes. The  lungs are otherwise clear. The central airways are patent. No pneumothorax or  pleural effusion. The heart size is normal. A left subclavian ported catheter  terminates in the superior SVC. Trace fluid in the mid esophagus (3-18) suggests  gastroesophageal reflux disease. The T4-T5 disc space is fused anteriorly  (889-93).    Abdomen: No abdominal lymphadenopathy. The stomach, duodenum, liver,  gallbladder, pancreas, spleen, adrenals, and kidneys are normal.     Cervical carcinoma:  The primary tumor is not visible. Pelvic malgorzata metastases  have continued to decrease in size. The largest, a conglomerate mass of multiple  nodes posterior to the right external iliac vessels, now measures 23 mm in short  axis and 23 x 30 x 45 mm in 3 dimensions, versus 23 x 34 x 49 mm on 8/12/2019  and 42 x 53 x 75 mm on 5/21/2019. Rim calcification around the treated  metastasis has increased from 8/12/2019. A left external iliac malgorzata metastasis  has also decreased, and measures 17 mm in short axis today, versus 21 mm on  8/12/2019. Bilateral, iliac, and aortocaval malgorzata metastases have also decreased  from 8/12/2019 and are no longer pathologically enlarged. Peritoneal  carcinomatosis, visible as left lower quadrant omental nodules on PET/CT, is no  longer visible. No overt peritoneal or omental nodularity. No ascites.     Pelvis: The cecum is mobile in the right upper quadrant, a normal variant. The  small bowel, ileocecal junction, appendix, colon, and bladder are normal. No  free air.     IMPRESSION:  1. Further decrease in pelvic and retroperitoneal malgorzata metastases. 2. No visible, residual, peritoneal carcinomatosis. 3. No metastases in the chest. Retrocrural and supraclavicular malgorzata metastases  are no longer visible. CT of chest/abdomen/pelvis (11/22/19)  THYROID: No nodule. MEDIASTINUM: Left subclavian port catheter terminates in the superior aspect of  the SVC/junction of the left brachycephalic vein. No mediastinal adenopathy. YNES: No mass or lymphadenopathy. THORACIC AORTA: No dissection or aneurysm. MAIN PULMONARY ARTERY: Normal in caliber. TRACHEA/BRONCHI: Patent. ESOPHAGUS: No wall thickening or dilatation. HEART: Normal in size. PLEURA: No effusion or pneumothorax. LUNGS: No nodule, mass, or airspace disease. LIVER: No mass or biliary dilatation. GALLBLADDER: Unremarkable. SPLEEN: No mass. PANCREAS: No mass or ductal dilatation. ADRENALS: Unremarkable.   KIDNEYS: No mass, calculus, or hydronephrosis. STOMACH: Unremarkable. SMALL BOWEL: No dilatation or wall thickening. COLON: No dilatation or wall thickening. APPENDIX: Nonvisualized  PERITONEUM: No ascites or mesenteric lymphadenopathy. No omental nodularity. RETROPERITONEUM: Redemonstration of para-aortic lymph nodes which are prominent  including a left common iliac lymph node measuring 6 mm in short axis and the  right common iliac lymph node measuring 11 mm in short axis (3:88). The right  common iliac lymph node is stable in size. The dominant lymph node in the right  external iliac chain measures 21 mm in short axis, previously 23 mm. The left  external iliac lymph node measures 15 mm in short axis, previously 16 mm. REPRODUCTIVE ORGANS: Uterus and adnexal structures are present and grossly  normal.  URINARY BLADDER: No mass or calculus. BONES: Unchanged millimetric benign-appearing sclerotic focus in the right  scapula. No aggressive osseous lesions. ADDITIONAL COMMENTS: N/A     IMPRESSION:  1.  Stable size of retroperitoneal and pelvic sidewall lymph nodes compared to  October 9, 2019. While a few of these lymph nodes measure 1 to 2 mm less in  size, this does not reflect a significant change. 2.  No evidence of intrathoracic metastasis. Pelvic MRI (2/27/20) - [facility]  Uterus:  No fibroids. Endometrium is grossly unremarkable. There is a Miah sleeve which terminates in the endometrial canal.     Cervix:  The cervix is small and contains a Nabothian cyst. There is abnormal signal in the patients cervix seen anteriorly and along the right side of the cervix. There is no large cervical mass. The anterior paracervical fat is hazy, it is unclear if this is related to treatment effect. Ovaries: There is a subcentimeter cyst of the right ovary the left ovary is mildly full but appears morphologically similar when compared with the prior exam.    Vagina:  Collapsed. Lymph nodes:   There is persistent pathologic bilateral pelvic adenopathy, but it is improving. On the prior exam there was a right external iliac/obturator lymph node which measured at least 3.5 cm short axis and currently measures closer to 2.1 cm. There is a left external iliac chain lymph node which measured at least 2.2 cm short axis and currently measures approximately 1.4 cm short axis. There is no worsening adenopathy. Of note, the field of view this exam does not extend above the aortic bifurcation. Bladder:  Collapsed and trabeculated. IMPRESSION:  1. Probable residual tumor along the right side and anterior portion of the cervix, as described above. Absence of similar prior exam and absence of postcontrast sequences does compromise comparison assessment. Interpretation was made within this caveat. 2.  Improving pelvic adenopathy. 3.  Remainder as above      PET/CT (5/28/20)  HEAD/NECK: No apparent foci of abnormal hypermetabolism. Cerebral evaluation is  limited by normal intense activity. Activity in the left supraclavicular lymph  node has resolved.     CHEST: No foci of abnormal hypermetabolism. Retrocrural adenopathy has resolved. Low-grade activity in the axilla lymph nodes is most likely reactive     ABDOMEN/PELVIS: Retroperitoneal activity has resolved. Pelvic adenopathy has  resolved. There is a calcified right pelvic sidewall lymph node.     There are 2 small foci of increased activity with SUV of 13.9 which which are  within the left ovary     SKELETON: No foci of abnormal hypermetabolism in the axial and visualized  appendicular skeleton.     IMPRESSION:   1.  Increased metabolic activity in bilateral pelvic adenopathy, retroperitoneal  adenopathy and left supraclavicular adenopathy has resolved.     There are 2 small foci of increased metabolic activity which correspond to a  normal sized left ovary of uncertain significance and most likely are  physiologic although small metastatic foci are not entirely excluded and  continued close follow-up would be helpful. CT of neck (10/16/20)  There is no significant mass or adenopathy in the neck. Scattered small lymph nodes are in the internal jugular and spinal accessory chains bilaterally. None of the lymph nodes reach pathologic size criteria. Bilateral parotid glands, submandibular glands and thyroid all appear unremarkable. Internal jugular veins, carotid arteries and vertebral arteries are all patent. Small central disc protrusion at the C4-5 level is present. No significant spinal stenosis or foraminal stenosis. No aggressive osseous lesion in the cervical spine. The skull base is unremarkable. There is mild mucosal thickening in the maxillary sinuses bilaterally. Tympanic cavities and mastoid air cells are well pneumatized. IMPRESSION:  1. No significant mass or adenopathy in the neck. 2. C4-5 level: Small central disc protrusion. CT of chest/abdomen/pelvis (10/16/20)  CHEST:  The lungs are clear. No pulmonary nodules or masses, atelectasis or, consolidation. No pleural or pericardial effusions. No significant adenopathy in the mediastinum, hilar, or axillary regions. Thoracic vertebral body heights are maintained, and there is no compression fracture. 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 Mild disc space narrowing with small anterior endplate osteophytes at several levels in the lower thoracic spine. No aggressive osseous lesion in the bony thorax. ABDOMEN:  No free peritoneal fluid or air, intra-abdominal abscess. No dilated loops of bowel, or evidence of any bowel obstruction. No appendicitis or diverticulitis. The liver is unremarkable. Portal veins patent. No biliary duct dilation. No inflammatory changes surrounding the gallbladder.     Pancreas, spleen, adrenals and right kidney are normal.    Hydronephrosis of the left kidney is new compared with 11/22/2019 CT, and is due to a new soft tissue mass obstructing the proximal left ureter in the retroperitoneum (slice position 773.05). This mass has a CT density measurement of 60 Hounsfield units, and is 1.7 x 2.1 x 3.1 cm in AP, transverse and CC dimensions. It is suspect for representing a new metastatic lymph node. The left ureter is not dilated distal to this mass, and there are no other sites of the left ureteral obstruction. There is no significant retroperitoneal adenopathy immediately adjacent to the aorta or inferior vena cava. The lumbar vertebral body heights are maintained, and there is no compression fracture. Degenerative disc bulge at the L5-S1 level with endplate osteophytes and moderate bilateral L5 foraminal stenosis. No aggressive osseous lesion in the lumbar spine. PELVIS:  A lymph node adjacent to the left external iliac vessels is 0.9 x 1.1 x 1.5 cm (slice position 162.79) decreased in size (previously 1.6 x 1.4 x 2.2 cm in same dimensions on 11/22/2019). A lymph node adjacent to the right external iliac vessels with peripheral calcifications is 2.3 x 1.8 x 3.7 cm, and is slightly decreased in size (previously 2.8 x 2.0 x 3.8 cm in same dimensions). No new pelvic adenopathy identified. Subtle stranding of the perirectal fat and ischiorectal fossa is slightly increased compared with 11/22/2019, and nonspecific in etiology. If the patient has had radiation, it may represent radiation change. The uterus is unremarkable. No free peritoneal fluid. Bony pelvis is unremarkable, without aggressive osseous lesions. IMPRESSION:  CHEST:  No acute process, metastatic disease, or worst significant change compared with 11/22/2019 CT. ABDOMEN:  1. New 3.1 cm soft tissue mass in the left side of the retroperitoneum of the upper abdomen causes obstruction of the right ureter and mild hydronephrosis. It is suspect for a new metastatic lymph node.     2. No other sites of the suspected metastatic disease in the upper abdomen. PELVIS:  1. Bilateral pelvic adenopathy has slightly decreased in size since 11/22/2019 CT. 2. Increased stranding of the perirectal fat without any focal mass is a nonspecific finding. If the patient has had prior radiation, it may represent radiation change. IMPRESSION/PLAN:  Norman Singer is a 50 y.o. 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 She was treated with 8 cycles of Taxol/Cistplatin/Avastin chemotherapy and had a great response. She was then treated with pelvic radiation therapy, including brachytherapy. She has had an excellent response to her radiation therapy, with no obvious disease remaining on exam.  A PET demonstrated resolution of her previously noted disease, however, there were two spots of activity in the left ovary. She is s/p c BSO, revealing a focus of metastatic disease within the left ovary. I reviewed the pathology with my partner, Dr. Ike Brown. He and I both agreed that no additional treatment was indicated, however, she remains at risk for recurrence and will need close surveillance. Her recent CT scan shows no evidence of metastatic/recurrent disease. I recommend repeat imaging in 3 months.         Signed By: Dione Kaur MD     10/29/2020/9:50 AM

## 2020-11-12 ENCOUNTER — APPOINTMENT (OUTPATIENT)
Dept: INFUSION THERAPY | Age: 55
End: 2020-11-12
Payer: COMMERCIAL

## 2020-11-30 RX ORDER — HEPARIN 100 UNIT/ML
500 SYRINGE INTRAVENOUS AS NEEDED
Status: CANCELLED | OUTPATIENT
Start: 2020-12-10

## 2020-11-30 RX ORDER — SODIUM CHLORIDE 9 MG/ML
10 INJECTION INTRAMUSCULAR; INTRAVENOUS; SUBCUTANEOUS AS NEEDED
Status: CANCELLED | OUTPATIENT
Start: 2020-12-10

## 2020-11-30 RX ORDER — SODIUM CHLORIDE 0.9 % (FLUSH) 0.9 %
5-10 SYRINGE (ML) INJECTION AS NEEDED
Status: CANCELLED | OUTPATIENT
Start: 2020-12-10

## 2020-12-01 ENCOUNTER — TELEPHONE (OUTPATIENT)
Dept: GYNECOLOGY | Age: 55
End: 2020-12-01

## 2020-12-03 ENCOUNTER — HOSPITAL ENCOUNTER (OUTPATIENT)
Dept: INFUSION THERAPY | Age: 55
End: 2020-12-03

## 2020-12-10 ENCOUNTER — HOSPITAL ENCOUNTER (OUTPATIENT)
Dept: INFUSION THERAPY | Age: 55
Discharge: HOME | End: 2020-12-10
Payer: COMMERCIAL

## 2020-12-10 VITALS
RESPIRATION RATE: 18 BRPM | TEMPERATURE: 98.9 F | DIASTOLIC BLOOD PRESSURE: 76 MMHG | HEART RATE: 80 BPM | SYSTOLIC BLOOD PRESSURE: 108 MMHG

## 2020-12-10 DIAGNOSIS — C77.9 METASTATIC SQUAMOUS CELL CARCINOMA TO LYMPH NODE (HCC): Primary | ICD-10-CM

## 2020-12-10 PROCEDURE — 77030012965 HC NDL HUBR BBMI -A

## 2020-12-10 PROCEDURE — 96523 IRRIG DRUG DELIVERY DEVICE: CPT

## 2020-12-10 RX ORDER — SODIUM CHLORIDE 0.9 % (FLUSH) 0.9 %
5-10 SYRINGE (ML) INJECTION AS NEEDED
Status: DISPENSED | OUTPATIENT
Start: 2020-12-10 | End: 2020-12-10

## 2020-12-10 RX ORDER — HEPARIN 100 UNIT/ML
500 SYRINGE INTRAVENOUS AS NEEDED
Status: ACTIVE | OUTPATIENT
Start: 2020-12-10 | End: 2020-12-10

## 2020-12-10 RX ORDER — SODIUM CHLORIDE 9 MG/ML
10 INJECTION INTRAMUSCULAR; INTRAVENOUS; SUBCUTANEOUS AS NEEDED
Status: ACTIVE | OUTPATIENT
Start: 2020-12-10 | End: 2020-12-10

## 2020-12-10 NOTE — PROGRESS NOTES
[facility]fusion [facility] Short Visit Progress Note    2653 Pt admit to [facility] for port flush ambulatory in stable condition. Assessment completed. No new concerns voiced. Patient Vitals for the past 12 hrs:   Temp Pulse Resp BP   12/10/20 0905 98.9 °F (37.2 °C) 80 18 108/76       Port accessed without positive blood return. Port area became a little puffy when saline flushed. Patient denies pain/discomfort when flushed. No redness. Port flushed, heparinized and de-accessed per protocol. Medications:  NS flushes  Heparin    0920 Pt tolerated treatment well. D/c home ambulatory in no distress. Pt aware of next appointment scheduled for 2/4/21.

## 2021-01-07 ENCOUNTER — APPOINTMENT (OUTPATIENT)
Dept: INFUSION THERAPY | Age: 56
End: 2021-01-07
Payer: COMMERCIAL

## 2021-02-01 RX ORDER — SODIUM CHLORIDE 0.9 % (FLUSH) 0.9 %
5-10 SYRINGE (ML) INJECTION AS NEEDED
Status: CANCELLED | OUTPATIENT
Start: 2021-02-11

## 2021-02-01 RX ORDER — SODIUM CHLORIDE 9 MG/ML
10 INJECTION INTRAMUSCULAR; INTRAVENOUS; SUBCUTANEOUS AS NEEDED
Status: CANCELLED | OUTPATIENT
Start: 2021-02-11

## 2021-02-01 RX ORDER — HEPARIN 100 UNIT/ML
500 SYRINGE INTRAVENOUS AS NEEDED
Status: CANCELLED | OUTPATIENT
Start: 2021-02-11

## 2021-02-01 NOTE — PROGRESS NOTES
27 [facility] [address], [address]  P [phone]  F [phone]    Office Note  Patient ID:  Name:  William Mullen  MRN:  [id number]  :  1965/55 y.o. Date:  2021      HISTORY OF PRESENT ILLNESS:  William Mullen is a 50 y.o.  perimenopausal female who is an established patient who was referred to me for at least severe cervical dysplasia. She is referred by Dr. Lesly Brennan. Her most recent pap smear was read as HGSIL and she was high-risk HPV positive. She underwent subsequent colposcopy with biopsy. This revealed ZBIGNIEW 2-3, but an invasive process could not be excluded. On a pelvic ultrasound she was noted to have a complex 4.1 cm right adnexal mass and a 3.7 cm complex mass in the cervix. I have been asked to see her in consultation for further evaluation and management. I recommended an exam under anesthesia with cervical biopsy, cystoscopy, proctoscopy. This was performed on 19. Operative findings:  Replacement of cervix with carcinoma, with extension into proximal anterior vagina.  Parametrial thickening bilaterally.  Uterus still somewhat mobile.  No appreciable disease in the bladder or rectum. FINAL PATHOLOGIC DIAGNOSIS   Cervix, biopsy:   Invasive squamous cell carcinoma   Comment   The case is also seen by Dr. Nayeli Maldonado who concurs with the findings. The results are communicated with Dr. Maury Reeves nurse, Marguerite Guajardo at approximately 1:30pm on 2019. Following the procedure and confirmation of the diagnosis of cancer, I sent her for a PET/CT. This demonstrated stage IVB disease. I explained that she has metastatic disease and she is not a candidate for surgical resection or curative radiation therapy. I discussed with them that the standard of care would be systemic chemotherapy, consisting of Taxol/Cisplatin/Avastin. If she has a good response, we may reconsider pelvic radiation for local control.   It may also be necessary for control of bleeding, if that becomes a significant issue. She completed 8 cycles of Taxol/Cisplatin/Avastin chemotherapy and tolerated well. She responded very well to treatment. The supraclavicular node had resolved completely and her retroperitoneal adenopathy had markedly improved. Her primary tumor had also shrunk significantly. I recommended pelvic radiation to provide local control. Since she lives in [facility] I referred her there for treatment. She subsequently was referred to radiation oncology at [facility] for brachytherapy. She completed all of her therapy in March 2020. She had not had any imaging since that time when she presented for follow-up. She reported some mild flank tenderness on the right, but nothing else. No vaginal bleeding, but some discharge. I sent her for a PET/CT to evaluate for persistent disease. There was no hypermetabolic activity remaining in the cervix or the previously identified nodes, however, there were two spots of activity in the left ovary. I recommended laparoscopic evaluation with BSO. She was taken to the OR on 6/22/20 for that procedure. Operative findings:  Normal appearing tubes and ovaries. Uterus mobile. Nothing suspicious for malignancy. Radiation changes noted in the pelvis. Fibrosis of the retroperitoneum. FINAL PATHOLOGIC DIAGNOSIS   1. 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 Ovary and fallopian tube, right salpingo-oophorectomy:   Serous cystadenoma   Comment   There are one or two metastatic deposits of squamous cell carcinoma within the parenchyma of the left ovary. No surface involvement is identified.       CYTOLOGIC INTERPRETATION   Pelvic Washing, ThinPrep:   Benign mesothelial cells and mixed inflammatory cells   General Categorization   No cells diagnostic for malignancy   Specimen Adequacy Satisfactory for evaluation     She presented for her postoperative examination. I reviewed the pathology with my partner, Dr. Milana Stallworth. He and I both agreed that no additional treatment was indicated, however, she remains at risk for recurrence and will need close surveillance. I recommended repeating a CT scan in 3 months. Her CT in 2020 showed no evidence of disease. I recommended another follow-up CT in another three months. She presents today for those results. Unfortunately it demonstrates likely recurrence, with left sided hydronephrosis. ROS:   and GI review:  Negative  Cardiopulmonary review:  Negative   Musculoskeletal:  Negative    A comprehensive review of systems was negative except for that written in the History of Present Illness. , 10 point ROS      OB/GYN ROS:  There is no history of significant gyn problems or procedures.       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 ([address] 2019    Hydroureter, right 2019    Chemotherapy induced neutropenia ([address] 2019    Malignant neoplasm of overlapping sites of cervix ([address] 2019    Complex cyst of right ovary 2019    Cervical cancer ([address] 2019     PMH:  Past Medical History:   Diagnosis Date    Cancer ([address]     CERVICAL    Cervical cancer ([address] 2019    Rheumatoid arthritis (HCC)       PSH:  Past Surgical History:   Procedure Laterality Date    HX  SECTION      HX COLPOSCOPY  2019    HGSIL      Social History:  Social History     Tobacco Use    Smoking status: Former Smoker     Packs/day: 0.50     Years: 2.00     Pack years: 1.00     Quit date: 1991     Years since quittin.7    Smokeless tobacco: Never Used   Substance Use Topics    Alcohol use: Yes     Comment: OCCASIONALLY 1/MONTH      Family History:  Family History   Problem Relation Age of Onset    No Known Problems Mother     Other Father         ACCIDENTAL DEATH    Thyroid Disease Brother     Anesth Problems Neg Hx       Medications: (reviewed)  Current Outpatient Medications   Medication Sig    OTHER Probiotics    multivitamin (ONE A DAY) tablet Take 1 Tab by mouth daily.  vitamin e (E GEMS) 1,000 unit capsule Take 1,000 Units by mouth daily.  ferrous sulfate 220 mg (44 mg iron)/5 mL solution Take  by mouth daily.  lidocaine-prilocaine (EMLA) topical cream Apply small amount over port area one hour before chemo treatment and cover with a Band-Aid    OTHER \"MANY VITAMINS AND SUPPLEMENTS\"    ibuprofen (ADVIL) 200 mg tablet Take 400 mg by mouth every eight (8) hours as needed for Pain. No current facility-administered medications for this visit. Allergies: (reviewed)  No Known Allergies       OBJECTIVE:    Physical Exam:  VITAL SIGNS: Vitals:    02/02/21 0848   BP: (!) 130/93   Pulse: 74   Weight: 167 lb 9.6 oz (76 kg)   Height: 5' 2.99\" (1.6 m)     Body mass index is 29.7 kg/m². GENERAL FAUSTINA: Conversant, alert, oriented. No acute distress. HEENT: HEENT. No thyroid enlargement. No JVD. Neck: Supple without restrictions. RESPIRATORY: Clear to auscultation and percussion to the bases. No CVAT. CARDIOVASC: RRR without murmur/rub. GASTROINT: soft, non-tender, without masses or organomegaly   MUSCULOSKEL: no joint tenderness, deformity or swelling   EXTREMITIES: extremities normal, atraumatic, no cyanosis or edema   PELVIC: Deferred. RECTAL: Deferred. KEIRY SURVEY: No suspicious lymphadenopathy or edema noted. NEURO: Grossly intact. No acute deficit.        Lab Data:    Lab Results   Component Value Date/Time    WBC 3.0 (L) 06/10/2020 11:26 AM    HGB 10.4 (L) 06/10/2020 11:26 AM    HCT 32.5 (L) 06/10/2020 11:26 AM    PLATELET 387 33/75/5221 11:26 AM    MCV 95.0 06/10/2020 11:26 AM     Lab Results   Component Value Date/Time    Sodium 141 06/10/2020 11:26 AM Potassium 4.1 06/10/2020 11:26 AM    Chloride 107 06/10/2020 11:26 AM    CO2 28 06/10/2020 11:26 AM    Anion gap 6 06/10/2020 11:26 AM    Glucose 86 06/10/2020 11:26 AM    BUN 9 06/10/2020 11:26 AM    Creatinine 0.51 (L) 06/10/2020 11:26 AM    BUN/Creatinine ratio 18 06/10/2020 11:26 AM    GFR est AA >60 06/10/2020 11:26 AM    GFR est non-AA >60 06/10/2020 11:26 AM    Calcium 8.9 06/10/2020 11:26 AM         PET/CT (5/21/19)  HEAD/NECK: There is 1.8 cm left level 4 lymph node with increased metabolic  activity of 11.     CHEST: No foci of abnormal hypermetabolism. Low-grade activity in a normal right  axillary lymph node is most likely physiologic.     ABDOMEN/PELVIS: There is a 1 cm retrocrural lymph node on the right with  increased metabolic activity of 5.      There is periaortic adenopathy measuring 2 cm with increased metabolic activity  of 9.     There is an enlarged lymph node at the aortic bifurcation with increased  metabolic activity.       There is a complex appearing mass right pelvic sidewall measuring 3.6 cm with  increased metabolic activity of 41.3. There is left iliac adenopathy with increased metabolic activity of 5.7.     There is a 1.4 cm soft tissue nodule intraperitoneal left lower quadrant with  increased metabolic activity of 7.     There is a mass in the cervix with increased metabolic activity of 12  There is right hydronephrosis.     SKELETON: No foci of abnormal hypermetabolism in the axial and visualized  appendicular skeleton.     IMPRESSION:   There is increased metabolic activity in a cervical mass consistent with  known primary neoplasm. There is adenopathy involving right pelvic sidewall, left iliac chain,  para-aortic chain, intra-abdominal nodule left lower quadrant, retrocrural lymph  node and left supra clavicular lymph node with increased metabolic activity  consistent with metastatic disease.  The cervical mass appears to be obstructing  the right ureter resulting in moderate right hydroureteronephrosis. CT of chest/abdomen/pelvis (8/13/19)  CT chest:    With a left chest Port-A-Cath terminates in the SVC. The visualized thyroid  gland is unremarkable. The aorta and main pulmonary artery are normal in  caliber. The heart size is normal.  There is no pericardial or pleural effusion.        There are no enlarged axillary, mediastinal, or hilar lymph nodes.      There is no lung mass or airspace opacity. There is no pneumothorax. The  central airways are clear.     CT abdomen and pelvis: The liver, spleen, pancreas, and adrenal glands are normal. The gall bladder is  present  without intra- or extra-hepatic biliary dilatation.       The kidneys are symmetric without hydronephrosis.      There are no dilated bowel loops. The appendix is normal.       There are no enlarged lymph nodes. There is no free fluid or free air. The  aorta tapers without aneurysm.     The urinary bladder is normal.  There is no pelvic mass. Dominant bilateral  ovarian follicles are noted.     There is degenerative change at L5-S1. There is no aggressive bony lesion.     IMPRESSION:   No evidence for metastatic disease or other acute abnormality in the chest,  abdomen, or pelvis. CT of chest/abdomen/pelvis (10/9/19)  Chest: No thoracic lymphadenopathy. Specifically, the right retrocrural and left  supraclavicular lymph node seen on prior PET/CT are no longer present. A few  subcentimeter foci of subpleural atelectasis are noted in the lower lobes. The  lungs are otherwise clear. The central airways are patent. No pneumothorax or  pleural effusion. The heart size is normal. A left subclavian ported catheter  terminates in the superior SVC. Trace fluid in the mid esophagus (3-18) suggests  gastroesophageal reflux disease. The T4-T5 disc space is fused anteriorly  (414-02).    Abdomen: No abdominal lymphadenopathy.  The stomach, duodenum, liver,  gallbladder, pancreas, spleen, adrenals, and kidneys are normal.     Cervical carcinoma: The primary tumor is not visible. Pelvic malgorzata metastases  have continued to decrease in size. The largest, a conglomerate mass of multiple  nodes posterior to the right external iliac vessels, now measures 23 mm in short  axis and 23 x 30 x 45 mm in 3 dimensions, versus 23 x 34 x 49 mm on 8/12/2019  and 42 x 53 x 75 mm on 5/21/2019. Rim calcification around the treated  metastasis has increased from 8/12/2019. A left external iliac malgorzata metastasis  has also decreased, and measures 17 mm in short axis today, versus 21 mm on  8/12/2019. Bilateral, iliac, and aortocaval malgorzata metastases have also decreased  from 8/12/2019 and are no longer pathologically enlarged. Peritoneal  carcinomatosis, visible as left lower quadrant omental nodules on PET/CT, is no  longer visible. No overt peritoneal or omental nodularity. No ascites.     Pelvis: The cecum is mobile in the right upper quadrant, a normal variant. The  small bowel, ileocecal junction, appendix, colon, and bladder are normal. No  free air.     IMPRESSION:  1. Further decrease in pelvic and retroperitoneal malgorzata metastases. 2. No visible, residual, peritoneal carcinomatosis. 3. No metastases in the chest. Retrocrural and supraclavicular malgorzata metastases  are no longer visible. CT of chest/abdomen/pelvis (11/22/19)  THYROID: No nodule. MEDIASTINUM: Left subclavian port catheter terminates in the superior aspect of  the SVC/junction of the left brachycephalic vein. No mediastinal adenopathy. YNES: No mass or lymphadenopathy. THORACIC AORTA: No dissection or aneurysm. MAIN PULMONARY ARTERY: Normal in caliber. TRACHEA/BRONCHI: Patent. ESOPHAGUS: No wall thickening or dilatation. HEART: Normal in size. PLEURA: No effusion or pneumothorax. LUNGS: No nodule, mass, or airspace disease. LIVER: No mass or biliary dilatation. GALLBLADDER: Unremarkable. SPLEEN: No mass.   PANCREAS: No mass or ductal dilatation.  ADRENALS: Unremarkable.  KIDNEYS: No mass, calculus, or hydronephrosis.  STOMACH: Unremarkable.  SMALL BOWEL: No dilatation or wall thickening.  COLON: No dilatation or wall thickening.  APPENDIX: Nonvisualized  PERITONEUM: No ascites or mesenteric lymphadenopathy. No omental nodularity.  RETROPERITONEUM: Redemonstration of para-aortic lymph nodes which are prominent  including a left common iliac lymph node measuring 6 mm in short axis and the  right common iliac lymph node measuring 11 mm in short axis (3:88). The right  common iliac lymph node is stable in size. The dominant lymph node in the right  external iliac chain measures 21 mm in short axis, previously 23 mm. The left  external iliac lymph node measures 15 mm in short axis, previously 16 mm.  REPRODUCTIVE ORGANS: Uterus and adnexal structures are present and grossly  normal.  URINARY BLADDER: No mass or calculus.  BONES: Unchanged millimetric benign-appearing sclerotic focus in the right  scapula. No aggressive osseous lesions.  ADDITIONAL COMMENTS: N/A     IMPRESSION:  1.  Stable size of retroperitoneal and pelvic sidewall lymph nodes compared to  October 9, 2019. While a few of these lymph nodes measure 1 to 2 mm less in  size, this does not reflect a significant change.  2.  No evidence of intrathoracic metastasis.      Pelvic MRI (2/27/20) - [facility]  Uterus:  No fibroids. Endometrium is grossly unremarkable. There is a Miah sleeve which terminates in the endometrial canal.     Cervix:  The cervix is small and contains a Nabothian cyst. There is abnormal signal in the patients cervix seen anteriorly and along the right side of the cervix. There is no large cervical mass. The anterior paracervical fat is hazy, it is unclear if this is related to treatment effect.    Ovaries:  There is a subcentimeter cyst of the right ovary the left ovary is mildly full but appears morphologically similar when compared with the prior  exam.    Vagina:  Collapsed. Lymph nodes: There is persistent pathologic bilateral pelvic adenopathy, but it is improving. On the prior exam there was a right external iliac/obturator lymph node which measured at least 3.5 cm short axis and currently measures closer to 2.1 cm. There is a left external iliac chain lymph node which measured at least 2.2 cm short axis and currently measures approximately 1.4 cm short axis. There is no worsening adenopathy. Of note, the field of view this exam does not extend above the aortic bifurcation. Bladder:  Collapsed and trabeculated. IMPRESSION:  1. Probable residual tumor along the right side and anterior portion of the cervix, as described above. Absence of similar prior exam and absence of postcontrast sequences does compromise comparison assessment. Interpretation was made within this caveat. 2.  Improving pelvic adenopathy. 3.  Remainder as above      PET/CT (5/28/20)  HEAD/NECK: No apparent foci of abnormal hypermetabolism. Cerebral evaluation is  limited by normal intense activity. Activity in the left supraclavicular lymph  node has resolved.     CHEST: No foci of abnormal hypermetabolism. Retrocrural adenopathy has resolved. Low-grade activity in the axilla lymph nodes is most likely reactive     ABDOMEN/PELVIS: Retroperitoneal activity has resolved. Pelvic adenopathy has  resolved. There is a calcified right pelvic sidewall lymph node.     There are 2 small foci of increased activity with SUV of 13.9 which which are  within the left ovary     SKELETON: No foci of abnormal hypermetabolism in the axial and visualized  appendicular skeleton.     IMPRESSION:   1.  Increased metabolic activity in bilateral pelvic adenopathy, retroperitoneal  adenopathy and left supraclavicular adenopathy has resolved.     There are 2 small foci of increased metabolic activity which correspond to a  normal sized left ovary of uncertain significance and most likely are  physiologic although small metastatic foci are not entirely excluded and  continued close follow-up would be helpful. CT of neck (10/16/20)  There is no significant mass or adenopathy in the neck. Scattered small lymph nodes are in the internal jugular and spinal accessory chains bilaterally. None of the lymph nodes reach pathologic size criteria. Bilateral parotid glands, submandibular glands and thyroid all appear unremarkable. Internal jugular veins, carotid arteries and vertebral arteries are all patent. Small central disc protrusion at the C4-5 level is present. No significant spinal stenosis or foraminal stenosis. No aggressive osseous lesion in the cervical spine. The skull base is unremarkable. There is mild mucosal thickening in the maxillary sinuses bilaterally. Tympanic cavities and mastoid air cells are well pneumatized. IMPRESSION:  1. No significant mass or adenopathy in the neck. 2. C4-5 level: Small central disc protrusion. CT of chest/abdomen/pelvis (10/16/20)  CHEST:  The lungs are clear. No pulmonary nodules or masses, atelectasis or, consolidation. No pleural or pericardial effusions. No significant adenopathy in the mediastinum, hilar, or axillary regions. Thoracic vertebral body heights are maintained, and there is no compression fracture. 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 Mild disc space narrowing with small anterior endplate osteophytes at several levels in the lower thoracic spine. No aggressive osseous lesion in the bony thorax. ABDOMEN:  No free peritoneal fluid or air, intra-abdominal abscess. No dilated loops of bowel, or evidence of any bowel obstruction. No appendicitis or diverticulitis. The liver is unremarkable. Portal veins patent. No biliary duct dilation. No inflammatory changes surrounding the gallbladder.     Pancreas, spleen, adrenals and right kidney are normal.    Hydronephrosis of the left kidney is new compared with 11/22/2019 CT, and is due to a new soft tissue mass obstructing the proximal left ureter in the retroperitoneum (slice position 806.56). This mass has a CT density measurement of 60 Hounsfield units, and is 1.7 x 2.1 x 3.1 cm in AP, transverse and CC dimensions. It is suspect for representing a new metastatic lymph node. The left ureter is not dilated distal to this mass, and there are no other sites of the left ureteral obstruction. There is no significant retroperitoneal adenopathy immediately adjacent to the aorta or inferior vena cava. The lumbar vertebral body heights are maintained, and there is no compression fracture. Degenerative disc bulge at the L5-S1 level with endplate osteophytes and moderate bilateral L5 foraminal stenosis. No aggressive osseous lesion in the lumbar spine. PELVIS:  A lymph node adjacent to the left external iliac vessels is 0.9 x 1.1 x 1.5 cm (slice position 164.52) decreased in size (previously 1.6 x 1.4 x 2.2 cm in same dimensions on 11/22/2019). A lymph node adjacent to the right external iliac vessels with peripheral calcifications is 2.3 x 1.8 x 3.7 cm, and is slightly decreased in size (previously 2.8 x 2.0 x 3.8 cm in same dimensions). No new pelvic adenopathy identified. Subtle stranding of the perirectal fat and ischiorectal fossa is slightly increased compared with 11/22/2019, and nonspecific in etiology. If the patient has had radiation, it may represent radiation change. The uterus is unremarkable. No free peritoneal fluid. Bony pelvis is unremarkable, without aggressive osseous lesions. IMPRESSION:  CHEST:  No acute process, metastatic disease, or worst significant change compared with 11/22/2019 CT. ABDOMEN:  1. New 3.1 cm soft tissue mass in the left side of the retroperitoneum of the upper abdomen causes obstruction of the right ureter and mild hydronephrosis. It is suspect for a new metastatic lymph node.     2. No other sites of the suspected metastatic disease in the upper abdomen. PELVIS:  1. Bilateral pelvic adenopathy has slightly decreased in size since 11/22/2019 CT. 2. Increased stranding of the perirectal fat without any focal mass is a nonspecific finding. If the patient has had prior radiation, it may represent radiation change. CT of chest/abdomen/pelvis (1/26/21)  CHEST:  Lungs: Mild bibasilar atelectasis. No new consolidation. Few unchanged pulmonary nodules. For example:  *  Right middle lobe, 0.3 cm (slice position 931.65)  *  Lingula, 0.3 cm (slice position 399. 52)  Lymph nodes and Mediastinum: Unremarkable. Pleura: Unremarkable. Cardiovascular: Normal heart size with no pericardial effusion. Left chest wall Port-A-Cath with tip at the proximal superior vena cava. Other: Unchanged 0.8 x 0.7 cm subdermal/dermal soft tissue nodule within the posterior chest wall/back. ABDOMEN/PELVIS:  Liver: Unremarkable. Gallbladder and bile ducts: Unremarkable. Spleen, pancreas, adrenals: Unremarkable. Kidneys and ureters: Increased size of 2.8 x 2.3 x 3.6 cm soft tissue implant, previously measuring 2.1 x 1.7 x 3.1 cm, which obstructs the left proximal ureter. There is resultant now moderate to severe hydroureteronephrosis. There is minimally delayed left renal nephrogram.  Unremarkable right kidney. Bowel: Nondilated bowel with no wall thickening. Normal appendix. Bladder: Asymmetric enhancement within the urinary bladder, likely due to right right renal jet/excretion. Reproductive organs: Stable appearance of the uterus. Post surgical changes from bilateral salpingo-oophorectomy. Lymph nodes: Unchanged 2.5 x 2.1 cm peripherally calcified right external iliac chain node. Unchanged 1.2 x 1.1 cm left external iliac chain node. Peritoneum: Unchanged presacral soft tissue thickening, likely postradiation change. .  Vessels: Unremarkable. Abdominal wall: Unremarkable.     MUSCULOSKELETAL:  No suspicious osseous lesions. IMPRESSION:  Since October 16, 2020, increased size of metastatic soft tissue implant obstructing the proximal left ureter with resultant moderate to severe hydroureteronephrosis. Minimally delayed left renal nephrogram as compared to the right. IMPRESSION/PLAN:  Dwayne Pride is a 50 y.o. 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 She was treated with 8 cycles of Taxol/Cistplatin/Avastin chemotherapy and had a great response. She was then treated with pelvic radiation therapy, including brachytherapy. She has had an excellent response to her radiation therapy, with no obvious disease remaining on exam.  A PET demonstrated resolution of her previously noted disease, however, there were two spots of activity in the left ovary. She was then taken to the OR for Lsc BSO, revealing a focus of metastatic disease within the left ovary. I reviewed the pathology with my partner, Dr. Rasheed Zepeda. He and I both agreed that no additional treatment was indicated, however, she was still at risk for recurrence and would need close surveillance. Her recent CT scan in October 2020 showed no evidence of metastatic/recurrent disease. I recommended repeat imaging in 3 months. Her newest scan demonstrates a mass in the retroperitoneum causing left hydronephrosis. This is most likely recurrent disease. If it is the only site of disease, it might be amenable to targeted radiation therapy. It appears to be outside of her previous radiation field. I am going to send her for a PET/CT to better evaluate and to assess for other sites of disease. If there area other sites found, she might benefit from more chemotherapy. Due to the hydronephrosis, I am going to refer her to urology for stent placement. I will see her back after the PET to discuss a definitive treatment plan. An electronic signature was used to sign this note.     Puma Andrade MD  02/02/21

## 2021-02-02 ENCOUNTER — OFFICE VISIT (OUTPATIENT)
Dept: GYNECOLOGY | Age: 56
End: 2021-02-02
Payer: COMMERCIAL

## 2021-02-02 VITALS
SYSTOLIC BLOOD PRESSURE: 130 MMHG | HEART RATE: 74 BPM | WEIGHT: 167.6 LBS | BODY MASS INDEX: 29.7 KG/M2 | HEIGHT: 63 IN | DIASTOLIC BLOOD PRESSURE: 93 MMHG

## 2021-02-02 DIAGNOSIS — R19.00 RETROPERITONEAL MASS: ICD-10-CM

## 2021-02-02 DIAGNOSIS — N13.30 HYDRONEPHROSIS, LEFT: ICD-10-CM

## 2021-02-02 DIAGNOSIS — C53.8 MALIGNANT NEOPLASM OF OVERLAPPING SITES OF CERVIX (HCC): Primary | ICD-10-CM

## 2021-02-02 PROCEDURE — 99214 OFFICE O/P EST MOD 30 MIN: CPT | Performed by: OBSTETRICS & GYNECOLOGY

## 2021-02-09 ENCOUNTER — HOSPITAL ENCOUNTER (OUTPATIENT)
Dept: PET IMAGING | Age: 56
Discharge: HOME | End: 2021-02-09
Attending: OBSTETRICS & GYNECOLOGY
Payer: COMMERCIAL

## 2021-02-09 VITALS — BODY MASS INDEX: 30.73 KG/M2 | WEIGHT: 167 LBS | HEIGHT: 62 IN

## 2021-02-09 DIAGNOSIS — R19.00 RETROPERITONEAL MASS: ICD-10-CM

## 2021-02-09 DIAGNOSIS — C53.8 MALIGNANT NEOPLASM OF OVERLAPPING SITES OF CERVIX (HCC): ICD-10-CM

## 2021-02-09 LAB
GLUCOSE BLD STRIP.AUTO-MCNC: 89 MG/DL (ref 65–100)
SERVICE CMNT-IMP: NORMAL

## 2021-02-09 PROCEDURE — A9552 F18 FDG: HCPCS

## 2021-02-09 RX ORDER — SODIUM CHLORIDE 0.9 % (FLUSH) 0.9 %
10 SYRINGE (ML) INJECTION
Status: COMPLETED | OUTPATIENT
Start: 2021-02-09 | End: 2021-02-09

## 2021-02-09 RX ADMIN — Medication 10 ML: at 09:15

## 2021-02-10 NOTE — PROGRESS NOTES
[address] [place] [address] Mathias Moritz [address], [address]  P [phone]  F [phone]    Office Note  Patient ID:  Name:  Junior Barrientos  MRN:  [id number]  :  1965/55 y.o. Date:  2021      HISTORY OF PRESENT ILLNESS:  Junior Barrientos is a 50 y.o.  perimenopausal female who is an established patient who was referred to me for at least severe cervical dysplasia. She is referred by Dr. Everett Vargas. Her most recent pap smear was read as HGSIL and she was high-risk HPV positive. She underwent subsequent colposcopy with biopsy. This revealed ZBIGNIEW 2-3, but an invasive process could not be excluded. On a pelvic ultrasound she was noted to have a complex 4.1 cm right adnexal mass and a 3.7 cm complex mass in the cervix. I have been asked to see her in consultation for further evaluation and management. I recommended an exam under anesthesia with cervical biopsy, cystoscopy, proctoscopy. This was performed on 19. Operative findings:  Replacement of cervix with carcinoma, with extension into proximal anterior vagina.  Parametrial thickening bilaterally.  Uterus still somewhat mobile.  No appreciable disease in the bladder or rectum. FINAL PATHOLOGIC DIAGNOSIS   Cervix, biopsy:   Invasive squamous cell carcinoma     Following the procedure and confirmation of the diagnosis of cancer, I sent her for a PET/CT. This demonstrated stage IVB disease. I explained that she has metastatic disease and she is not a candidate for surgical resection or curative radiation therapy. I discussed with them that the standard of care would be systemic chemotherapy, consisting of Taxol/Cisplatin/Avastin. If she has a good response, we may reconsider pelvic radiation for local control. It may also be necessary for control of bleeding, if that becomes a significant issue. She completed 8 cycles of Taxol/Cisplatin/Avastin chemotherapy and tolerated well.   She responded very well to treatment. The supraclavicular node had resolved completely and her retroperitoneal adenopathy had markedly improved. Her primary tumor had also shrunk significantly. I recommended pelvic radiation to provide local control. Since she lives in [facility] I referred her there for treatment. She subsequently was referred to radiation oncology at [facility] for brachytherapy. She completed all of her therapy in March 2020. She had not had any imaging since that time when she presented for follow-up. She reported some mild flank tenderness on the right, but nothing else. No vaginal bleeding, but some discharge. I sent her for a PET/CT to evaluate for persistent disease. There was no hypermetabolic activity remaining in the cervix or the previously identified nodes, however, there were two spots of activity in the left ovary. I recommended laparoscopic evaluation with BSO. She was taken to the OR on 6/22/20 for that procedure. Operative findings:  Normal appearing tubes and ovaries. Uterus mobile. Nothing suspicious for malignancy. Radiation changes noted in the pelvis. Fibrosis of the retroperitoneum. FINAL PATHOLOGIC DIAGNOSIS   1. 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 Ovary and fallopian tube, right salpingo-oophorectomy:   Serous cystadenoma   Comment   There are one or two metastatic deposits of squamous cell carcinoma within the parenchyma of the left ovary. No surface involvement is identified. CYTOLOGIC INTERPRETATION   Pelvic Washing, ThinPrep:   Benign mesothelial cells and mixed inflammatory cells   General Categorization   No cells diagnostic for malignancy   Specimen Adequacy   Satisfactory for evaluation     She presented for her postoperative examination. I reviewed the pathology with my partner, Dr. Kary Nelson.   He and I both agreed that no additional treatment was indicated, however, she remains at risk for recurrence and will need close surveillance. I recommended repeating a CT scan in 3 months. Her CT in 2020 showed no evidence of disease. I recommended another follow-up CT in another three months. She presented recently for those results. Unfortunately it demonstrated likely recurrence, with left-sided hydronephrosis. I sent her for a PET/CT to evaluate the extent of disease. There did not appear to be disease other than left-sided paraaortic nodes. Due to the hydronephrosis, I referred her to urology for stent placement. She saw someone earlier today but has not scheduled stent placement yet. ROS:   and GI review:  Negative  Cardiopulmonary review:  Negative   Musculoskeletal:  Negative    A comprehensive review of systems was negative except for that written in the History of Present Illness. , 10 point ROS      OB/GYN ROS:  There is no history of significant gyn problems or procedures.       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 ([address] 2019    Hydroureter, right 2019    Chemotherapy induced neutropenia ([address] 2019    Malignant neoplasm of overlapping sites of cervix ([address] 2019    Complex cyst of right ovary 2019    Cervical cancer ([address] 2019     PMH:  Past Medical History:   Diagnosis Date    Cancer ([address]     CERVICAL    Cervical cancer ([address] 2019    Rheumatoid arthritis (HCC)       PSH:  Past Surgical History:   Procedure Laterality Date    HX  SECTION      HX COLPOSCOPY  2019    HGSIL      Social History:  Social History     Tobacco Use    Smoking status: Former Smoker     Packs/day: 0.50     Years: 2.00     Pack years: 1.00     Quit date: 1991     Years since quittin.7    Smokeless tobacco: Never Used   Substance Use Topics    Alcohol use: Yes     Comment: OCCASIONALLY 1/MONTH      Family History:  Family History   Problem Relation Age of Onset    No Known Problems Mother     Other Father         ACCIDENTAL DEATH    Thyroid Disease Brother     Anesth Problems Neg Hx       Medications: (reviewed)  Current Outpatient Medications   Medication Sig    OTHER Probiotics    multivitamin (ONE A DAY) tablet Take 1 Tab by mouth daily.  vitamin e (E GEMS) 1,000 unit capsule Take 1,000 Units by mouth daily.  ferrous sulfate 220 mg (44 mg iron)/5 mL solution Take  by mouth daily.  lidocaine-prilocaine (EMLA) topical cream Apply small amount over port area one hour before chemo treatment and cover with a Band-Aid    OTHER \"MANY VITAMINS AND SUPPLEMENTS\"    ibuprofen (ADVIL) 200 mg tablet Take 400 mg by mouth every eight (8) hours as needed for Pain. No current facility-administered medications for this visit. Allergies: (reviewed)  No Known Allergies       OBJECTIVE:    Physical Exam:  VITAL SIGNS: Vitals:    02/11/21 1047   BP: (!) 124/98   Pulse: 85   Weight: 167 lb (75.8 kg)   Height: 5' 2.01\" (1.575 m)     Body mass index is 30.54 kg/m². GENERAL FAUSTINA: Conversant, alert, oriented. No acute distress. HEENT: HEENT. No thyroid enlargement. No JVD. Neck: Supple without restrictions. RESPIRATORY: Clear to auscultation and percussion to the bases. No CVAT. CARDIOVASC: RRR without murmur/rub. GASTROINT: soft, non-tender, without masses or organomegaly   MUSCULOSKEL: no joint tenderness, deformity or swelling   EXTREMITIES: extremities normal, atraumatic, no cyanosis or edema   PELVIC: Deferred. RECTAL: Deferred. KEIRY SURVEY: No suspicious lymphadenopathy or edema noted. NEURO: Grossly intact. No acute deficit.        Lab Data:    Lab Results   Component Value Date/Time    WBC 3.0 (L) 06/10/2020 11:26 AM    HGB 10.4 (L) 06/10/2020 11:26 AM    HCT 32.5 (L) 06/10/2020 11:26 AM    PLATELET 917 84/10/1165 11:26 AM    MCV 95.0 06/10/2020 11:26 AM     Lab Results   Component Value Date/Time    Sodium 141 06/10/2020 11:26 AM    Potassium 4.1 06/10/2020 11:26 AM    Chloride 107 06/10/2020 11:26 AM    CO2 28 06/10/2020 11:26 AM    Anion gap 6 06/10/2020 11:26 AM    Glucose 86 06/10/2020 11:26 AM    BUN 9 06/10/2020 11:26 AM    Creatinine 0.51 (L) 06/10/2020 11:26 AM    BUN/Creatinine ratio 18 06/10/2020 11:26 AM    GFR est AA >60 06/10/2020 11:26 AM    GFR est non-AA >60 06/10/2020 11:26 AM    Calcium 8.9 06/10/2020 11:26 AM       Pelvic MRI (2/27/20) - [facility]  Uterus:  No fibroids. Endometrium is grossly unremarkable. There is a Miah sleeve which terminates in the endometrial canal.     Cervix:  The cervix is small and contains a Nabothian cyst. There is abnormal signal in the patients cervix seen anteriorly and along the right side of the cervix. There is no large cervical mass. The anterior paracervical fat is hazy, it is unclear if this is related to treatment effect. Ovaries: There is a subcentimeter cyst of the right ovary the left ovary is mildly full but appears morphologically similar when compared with the prior exam.    Vagina:  Collapsed. Lymph nodes: There is persistent pathologic bilateral pelvic adenopathy, but it is improving. On the prior exam there was a right external iliac/obturator lymph node which measured at least 3.5 cm short axis and currently measures closer to 2.1 cm. There is a left external iliac chain lymph node which measured at least 2.2 cm short axis and currently measures approximately 1.4 cm short axis. There is no worsening adenopathy. Of note, the field of view this exam does not extend above the aortic bifurcation. Bladder:  Collapsed and trabeculated. IMPRESSION:  1. Probable residual tumor along the right side and anterior portion of the cervix, as described above. Absence of similar prior exam and absence of postcontrast sequences does compromise comparison assessment. Interpretation was made within this caveat. 2.  Improving pelvic adenopathy. 3.  Remainder as above      PET/CT (5/28/20)  HEAD/NECK: No apparent foci of abnormal hypermetabolism. Cerebral evaluation is  limited by normal intense activity. Activity in the left supraclavicular lymph  node has resolved.     CHEST: No foci of abnormal hypermetabolism. Retrocrural adenopathy has resolved. Low-grade activity in the axilla lymph nodes is most likely reactive     ABDOMEN/PELVIS: Retroperitoneal activity has resolved. Pelvic adenopathy has  resolved. There is a calcified right pelvic sidewall lymph node.     There are 2 small foci of increased activity with SUV of 13.9 which which are  within the left ovary     SKELETON: No foci of abnormal hypermetabolism in the axial and visualized  appendicular skeleton.     IMPRESSION:   1. Increased metabolic activity in bilateral pelvic adenopathy, retroperitoneal  adenopathy and left supraclavicular adenopathy has resolved.     There are 2 small foci of increased metabolic activity which correspond to a  normal sized left ovary of uncertain significance and most likely are  physiologic although small metastatic foci are not entirely excluded and  continued close follow-up would be helpful. CT of neck (10/16/20)  There is no significant mass or adenopathy in the neck. Scattered small lymph nodes are in the internal jugular and spinal accessory chains bilaterally. None of the lymph nodes reach pathologic size criteria. Bilateral parotid glands, submandibular glands and thyroid all appear unremarkable. Internal jugular veins, carotid arteries and vertebral arteries are all patent. Small central disc protrusion at the C4-5 level is present. No significant spinal stenosis or foraminal stenosis. No aggressive osseous lesion in the cervical spine. The skull base is unremarkable. There is mild mucosal thickening in the maxillary sinuses bilaterally.  Tympanic cavities and mastoid air cells are well pneumatized. IMPRESSION:  1. No significant mass or adenopathy in the neck. 2. C4-5 level: Small central disc protrusion. CT of chest/abdomen/pelvis (10/16/20)  CHEST:  The lungs are clear. No pulmonary nodules or masses, atelectasis or, consolidation. No pleural or pericardial effusions. No significant adenopathy in the mediastinum, hilar, or axillary regions. Thoracic vertebral body heights are maintained, and there is no compression fracture. 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 Mild disc space narrowing with small anterior endplate osteophytes at several levels in the lower thoracic spine. No aggressive osseous lesion in the bony thorax. ABDOMEN:  No free peritoneal fluid or air, intra-abdominal abscess. No dilated loops of bowel, or evidence of any bowel obstruction. No appendicitis or diverticulitis. The liver is unremarkable. Portal veins patent. No biliary duct dilation. No inflammatory changes surrounding the gallbladder. Pancreas, spleen, adrenals and right kidney are normal.    Hydronephrosis of the left kidney is new compared with 11/22/2019 CT, and is due to a new soft tissue mass obstructing the proximal left ureter in the retroperitoneum (slice position 255.05). This mass has a CT density measurement of 60 Hounsfield units, and is 1.7 x 2.1 x 3.1 cm in AP, transverse and CC dimensions. It is suspect for representing a new metastatic lymph node. The left ureter is not dilated distal to this mass, and there are no other sites of the left ureteral obstruction. There is no significant retroperitoneal adenopathy immediately adjacent to the aorta or inferior vena cava. The lumbar vertebral body heights are maintained, and there is no compression fracture. Degenerative disc bulge at the L5-S1 level with endplate osteophytes and moderate bilateral L5 foraminal stenosis.  No aggressive osseous lesion in the lumbar spine.    PELVIS:  A lymph node adjacent to the left external iliac vessels is 0.9 x 1.1 x 1.5 cm (slice position 324.46) decreased in size (previously 1.6 x 1.4 x 2.2 cm in same dimensions on 11/22/2019). A lymph node adjacent to the right external iliac vessels with peripheral calcifications is 2.3 x 1.8 x 3.7 cm, and is slightly decreased in size (previously 2.8 x 2.0 x 3.8 cm in same dimensions). No new pelvic adenopathy identified. Subtle stranding of the perirectal fat and ischiorectal fossa is slightly increased compared with 11/22/2019, and nonspecific in etiology. If the patient has had radiation, it may represent radiation change. The uterus is unremarkable. No free peritoneal fluid. Bony pelvis is unremarkable, without aggressive osseous lesions. IMPRESSION:  CHEST:  No acute process, metastatic disease, or worst significant change compared with 11/22/2019 CT. ABDOMEN:  1. New 3.1 cm soft tissue mass in the left side of the retroperitoneum of the upper abdomen causes obstruction of the right ureter and mild hydronephrosis. It is suspect for a new metastatic lymph node. 2. No other sites of the suspected metastatic disease in the upper abdomen. PELVIS:  1. Bilateral pelvic adenopathy has slightly decreased in size since 11/22/2019 CT. 2. Increased stranding of the perirectal fat without any focal mass is a nonspecific finding. If the patient has had prior radiation, it may represent radiation change. CT of chest/abdomen/pelvis (1/26/21)  CHEST:  Lungs: Mild bibasilar atelectasis. No new consolidation. Few unchanged pulmonary nodules. For example:  *  Right middle lobe, 0.3 cm (slice position 722.48)  *  Lingula, 0.3 cm (slice position 379. 52)  Lymph nodes and Mediastinum: Unremarkable. Pleura: Unremarkable. Cardiovascular: Normal heart size with no pericardial effusion. Left chest wall Port-A-Cath with tip at the proximal superior vena cava.   Other: Unchanged 0.8 x 0.7 cm subdermal/dermal soft tissue nodule within the posterior chest wall/back. ABDOMEN/PELVIS:  Liver: Unremarkable. Gallbladder and bile ducts: Unremarkable. Spleen, pancreas, adrenals: Unremarkable. Kidneys and ureters: Increased size of 2.8 x 2.3 x 3.6 cm soft tissue implant, previously measuring 2.1 x 1.7 x 3.1 cm, which obstructs the left proximal ureter. There is resultant now moderate to severe hydroureteronephrosis. There is minimally delayed left renal nephrogram.  Unremarkable right kidney. Bowel: Nondilated bowel with no wall thickening. Normal appendix. Bladder: Asymmetric enhancement within the urinary bladder, likely due to right right renal jet/excretion. Reproductive organs: Stable appearance of the uterus. Post surgical changes from bilateral salpingo-oophorectomy. Lymph nodes: Unchanged 2.5 x 2.1 cm peripherally calcified right external iliac chain node. Unchanged 1.2 x 1.1 cm left external iliac chain node. Peritoneum: Unchanged presacral soft tissue thickening, likely postradiation change. .  Vessels: Unremarkable. Abdominal wall: Unremarkable. MUSCULOSKELETAL:  No suspicious osseous lesions. IMPRESSION:  Since October 16, 2020, increased size of metastatic soft tissue implant obstructing the proximal left ureter with resultant moderate to severe hydroureteronephrosis. Minimally delayed left renal nephrogram as compared to the right. PET/CT (2/9/21)  HEAD/NECK: No apparent foci of abnormal hypermetabolism. Cerebral evaluation is  limited by normal intense activity.     CHEST: No foci of abnormal hypermetabolism.     ABDOMEN/PELVIS: There are small but new mildly hypermetabolic left para-aortic  lymph nodes maximum SUV 8.1.     SKELETON: No foci of abnormal hypermetabolism in the axial and visualized  appendicular skeleton.     IMPRESSION  Small but new mildly hypermetabolic left para-aortic lymph nodes. Otherwise normal tracer distribution.       IMPRESSION/PLAN:  Paola Michel is a 50 y.o. 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 She was treated with 8 cycles of Taxol/Cistplatin/Avastin chemotherapy and had a great response. She was then treated with pelvic radiation therapy, including brachytherapy. She has had an excellent response to her radiation therapy, with no obvious disease remaining on exam.  A PET demonstrated resolution of her previously noted disease, however, there were two spots of activity in the left ovary. She was then taken to the OR for c BSO, revealing a focus of metastatic disease within the left ovary. I reviewed the pathology with my partner, Dr. Nikki Myles. He and I both agreed that no additional treatment was indicated, however, she was still at risk for recurrence and would need close surveillance. Her CT scan in October 2020 showed no evidence of metastatic/recurrent disease. I recommended repeat imaging in 3 months. Her newest CT scan demonstrated a mass in the retroperitoneum causing left hydronephrosis, consistent with recurrent disease. I sent her for a PET/CT to evaluate, because if this were the only site of disease, it might be amenable to targeted radiation therapy. It appears to be outside of her previous radiation field. The PET/CT shows no other evidence of disease. I am going to refer her back to radiation oncology in [place] for targeted radiation therapy of the left-sided paraaortic nodes. Ultimately she may require systemic therapy, but I would like to hold off until other options have been exhausted. We will need to test her tumor at some point to see if immunotherapy with Tere Muñoz is an option. An electronic signature was used to sign this note.     Martine Nettles MD  02/11/21

## 2021-02-11 ENCOUNTER — OFFICE VISIT (OUTPATIENT)
Dept: GYNECOLOGY | Age: 56
End: 2021-02-11
Payer: COMMERCIAL

## 2021-02-11 ENCOUNTER — HOSPITAL ENCOUNTER (OUTPATIENT)
Dept: INFUSION THERAPY | Age: 56
Discharge: HOME | End: 2021-02-11
Payer: COMMERCIAL

## 2021-02-11 VITALS
WEIGHT: 167 LBS | DIASTOLIC BLOOD PRESSURE: 98 MMHG | HEIGHT: 62 IN | HEART RATE: 85 BPM | BODY MASS INDEX: 30.73 KG/M2 | SYSTOLIC BLOOD PRESSURE: 124 MMHG

## 2021-02-11 VITALS
RESPIRATION RATE: 18 BRPM | TEMPERATURE: 96.9 F | HEART RATE: 85 BPM | SYSTOLIC BLOOD PRESSURE: 124 MMHG | DIASTOLIC BLOOD PRESSURE: 94 MMHG

## 2021-02-11 DIAGNOSIS — C77.9 METASTATIC SQUAMOUS CELL CARCINOMA TO LYMPH NODE (HCC): Primary | ICD-10-CM

## 2021-02-11 DIAGNOSIS — N13.30 HYDRONEPHROSIS, LEFT: ICD-10-CM

## 2021-02-11 DIAGNOSIS — C53.8 MALIGNANT NEOPLASM OF OVERLAPPING SITES OF CERVIX (HCC): Primary | ICD-10-CM

## 2021-02-11 DIAGNOSIS — R19.00 RETROPERITONEAL MASS: ICD-10-CM

## 2021-02-11 PROCEDURE — 74011250636 HC RX REV CODE- 250/636: Performed by: PHYSICIAN ASSISTANT

## 2021-02-11 PROCEDURE — 77030012965 HC NDL HUBR BBMI -A

## 2021-02-11 PROCEDURE — 96523 IRRIG DRUG DELIVERY DEVICE: CPT

## 2021-02-11 PROCEDURE — 99214 OFFICE O/P EST MOD 30 MIN: CPT | Performed by: OBSTETRICS & GYNECOLOGY

## 2021-02-11 RX ORDER — SODIUM CHLORIDE 0.9 % (FLUSH) 0.9 %
5-10 SYRINGE (ML) INJECTION AS NEEDED
Status: DISCONTINUED | OUTPATIENT
Start: 2021-02-11 | End: 2021-02-12 | Stop reason: HOSPADM

## 2021-02-11 RX ORDER — HEPARIN 100 UNIT/ML
500 SYRINGE INTRAVENOUS AS NEEDED
Status: DISCONTINUED | OUTPATIENT
Start: 2021-02-11 | End: 2021-02-12 | Stop reason: HOSPADM

## 2021-02-11 RX ORDER — SODIUM CHLORIDE 9 MG/ML
10 INJECTION INTRAMUSCULAR; INTRAVENOUS; SUBCUTANEOUS AS NEEDED
Status: DISCONTINUED | OUTPATIENT
Start: 2021-02-11 | End: 2021-02-12 | Stop reason: HOSPADM

## 2021-02-11 RX ADMIN — SODIUM CHLORIDE 10 ML: 9 INJECTION INTRAMUSCULAR; INTRAVENOUS; SUBCUTANEOUS at 13:19

## 2021-02-11 RX ADMIN — HEPARIN 500 UNITS: 100 SYRINGE at 13:19

## 2021-02-11 RX ADMIN — Medication 10 ML: at 13:19

## 2021-02-11 NOTE — PROGRESS NOTES
[facility] Short Visit Progress Note    [id number] Pt admit to [address] [place] [address] for port flush ambulatory in stable condition. Assessment completed. No new concerns voiced. Patient denies SOB, fever, cough, and/or general not feeling well. Patient denies recent exposure to someone who has tested positive for COVID-19. Patient denies contact with anyone who has a pending COVID test.     Visit Vitals  BP (!) 124/94   Pulse 85   Temp 96.9 °F (36.1 °C)   Resp 18       Port accessed with positive blood return. Line flushed, heparinized and de-accessed    Medications:  Medications Administered     0.9% sodium chloride injection 10 mL     Admin Date  02/11/2021 Action  Given Dose  10 mL Route  IntraVENous Administered By  Adelita Lee RN          heparin (porcine) pf 500 Units     Admin Date  02/11/2021 Action  Given Dose  500 Units Route  IntraVENous Administered By  Adelita Lee RN          sodium chloride (NS) flush 5-10 mL     Admin Date  02/11/2021 Action  Given Dose  10 mL Route  IntraVENous Administered By  Adelita Lee, CLARITA                3153 Pt tolerated treatment well. D/c home ambulatory in no distress.  Pt aware of next appointment scheduled for 4/1/21

## 2021-02-22 RX ORDER — HEPARIN 100 UNIT/ML
500 SYRINGE INTRAVENOUS AS NEEDED
Status: CANCELLED | OUTPATIENT
Start: 2021-04-01

## 2021-02-22 RX ORDER — SODIUM CHLORIDE 9 MG/ML
10 INJECTION INTRAMUSCULAR; INTRAVENOUS; SUBCUTANEOUS AS NEEDED
Status: CANCELLED | OUTPATIENT
Start: 2021-04-01

## 2021-02-22 RX ORDER — SODIUM CHLORIDE 0.9 % (FLUSH) 0.9 %
5-10 SYRINGE (ML) INJECTION AS NEEDED
Status: CANCELLED | OUTPATIENT
Start: 2021-04-01

## 2021-03-24 ENCOUNTER — TELEPHONE (OUTPATIENT)
Dept: GYNECOLOGY | Age: 56
End: 2021-03-24

## 2021-03-24 NOTE — TELEPHONE ENCOUNTER
Pt would like to talk with you as Dr. Lalo Rodríguez told her she would need chemotherapy, and he would be speaking with you. Pt is upset, crying and does not understand.

## 2021-03-25 NOTE — TELEPHONE ENCOUNTER
I called pt back to explain Cisplatin with radiation, and she appears to understand. Pt requesting to have Chemotherapy at [facility]. She is waiting for MRI and fu with Dr. Ross Mars, and his office will call to let us know when she will be starting radiation so we can initiate chemotherapy appt.

## 2021-04-12 ENCOUNTER — TELEPHONE (OUTPATIENT)
Dept: GYNECOLOGY | Age: 56
End: 2021-04-12

## 2021-04-12 NOTE — TELEPHONE ENCOUNTER
Pt calling to report she was to begin C1 chemotherapy today with radiation in [place], but port did not work, and they were unable to acces peripheral site. She is scheduled to have port a gram this afternoon, and will report back.

## 2021-07-22 ENCOUNTER — HOSPITAL ENCOUNTER (OUTPATIENT)
Dept: INTERVENTIONAL RADIOLOGY/VASCULAR | Age: 56
Discharge: HOME | End: 2021-07-22
Attending: RADIOLOGY | Admitting: RADIOLOGY
Payer: COMMERCIAL

## 2021-07-22 VITALS
DIASTOLIC BLOOD PRESSURE: 82 MMHG | OXYGEN SATURATION: 99 % | HEIGHT: 63 IN | RESPIRATION RATE: 16 BRPM | TEMPERATURE: 98.3 F | SYSTOLIC BLOOD PRESSURE: 112 MMHG | BODY MASS INDEX: 27.11 KG/M2 | WEIGHT: 153 LBS | HEART RATE: 70 BPM

## 2021-07-22 DIAGNOSIS — N13.30 HYDRONEPHROSIS: ICD-10-CM

## 2021-07-22 PROCEDURE — C1894 INTRO/SHEATH, NON-LASER: HCPCS

## 2021-07-22 PROCEDURE — 77030011229 HC DIL VESL COON COOK -A

## 2021-07-22 PROCEDURE — 74011000250 HC RX REV CODE- 250: Performed by: RADIOLOGY

## 2021-07-22 PROCEDURE — 50432 PLMT NEPHROSTOMY CATHETER: CPT

## 2021-07-22 PROCEDURE — 77030020795 HC BG DRN WSTE MRTM -C

## 2021-07-22 PROCEDURE — C1729 CATH, DRAINAGE: HCPCS

## 2021-07-22 PROCEDURE — 74011250636 HC RX REV CODE- 250/636: Performed by: RADIOLOGY

## 2021-07-22 PROCEDURE — 2709999900 HC NON-CHARGEABLE SUPPLY

## 2021-07-22 RX ORDER — LIDOCAINE HYDROCHLORIDE 20 MG/ML
20 INJECTION, SOLUTION INFILTRATION; PERINEURAL
Status: COMPLETED | OUTPATIENT
Start: 2021-07-22 | End: 2021-07-22

## 2021-07-22 RX ORDER — MIDAZOLAM HYDROCHLORIDE 1 MG/ML
5 INJECTION, SOLUTION INTRAMUSCULAR; INTRAVENOUS
Status: DISCONTINUED | OUTPATIENT
Start: 2021-07-22 | End: 2021-07-22

## 2021-07-22 RX ORDER — CEFAZOLIN SODIUM 1 G/3ML
1 INJECTION, POWDER, FOR SOLUTION INTRAMUSCULAR; INTRAVENOUS ONCE
Status: DISCONTINUED | OUTPATIENT
Start: 2021-07-22 | End: 2021-07-22 | Stop reason: SDUPTHER

## 2021-07-22 RX ORDER — FENTANYL CITRATE 50 UG/ML
25-100 INJECTION, SOLUTION INTRAMUSCULAR; INTRAVENOUS
Status: DISCONTINUED | OUTPATIENT
Start: 2021-07-22 | End: 2021-07-22

## 2021-07-22 RX ORDER — CEFAZOLIN SODIUM 1 G/3ML
INJECTION, POWDER, FOR SOLUTION INTRAMUSCULAR; INTRAVENOUS
Status: DISCONTINUED
Start: 2021-07-22 | End: 2021-07-22

## 2021-07-22 RX ADMIN — MIDAZOLAM HYDROCHLORIDE 1 MG: 1 INJECTION, SOLUTION INTRAMUSCULAR; INTRAVENOUS at 14:13

## 2021-07-22 RX ADMIN — WATER 2 G: 1 INJECTION INTRAMUSCULAR; INTRAVENOUS; SUBCUTANEOUS at 13:24

## 2021-07-22 RX ADMIN — MIDAZOLAM HYDROCHLORIDE 1 MG: 1 INJECTION, SOLUTION INTRAMUSCULAR; INTRAVENOUS at 13:58

## 2021-07-22 RX ADMIN — FENTANYL CITRATE 50 MCG: 50 INJECTION, SOLUTION INTRAMUSCULAR; INTRAVENOUS at 14:13

## 2021-07-22 RX ADMIN — LIDOCAINE HYDROCHLORIDE 20 MG: 20 INJECTION, SOLUTION INFILTRATION; PERINEURAL at 14:10

## 2021-07-22 RX ADMIN — FENTANYL CITRATE 50 MCG: 50 INJECTION, SOLUTION INTRAMUSCULAR; INTRAVENOUS at 13:58

## 2021-07-22 NOTE — DISCHARGE INSTRUCTIONS
Patient Education   Patient Education        Cuidado de la sonda de nefrostomía: Instrucciones de cuidado  Nephrostomy Tube Care: Care Instructions  Instrucciones de cuidado    Amish sonda de nefrostomía es un tubo cook que se coloca en el riñón para drenar la orina. Podría tener amish sonda en un Hermina Peter sondas, amish en [place]. La orina se recoge en [place] adherida a la sonda. En la [employer], la bolsa se adhiere a la pierna. A veces, la sonda tiene amish válvula que permite drenar la orina al inodoro u otro recipiente. Podría necesitar amish sonda de nefrostomía si tiene amish obstrucción o un agujero en las vías urinarias. La obstrucción podría ser causada por un cálculo renal, amish infección, tejido cicatricial o un tumor. Si tiene solo [place], [place] tendrá necesidad de orinar. El otro Kasey Adie produciendo orina que se drenará a la vejiga. Tener amish sonda de nefrostomía por mucho tiempo aumenta el riesgo de tener amish infección. El objetivo del cuidado de la sonda de nefrostomía es prevenir las infecciones. La atención de seguimiento es amish parte clave de cai tratamiento y seguridad. Asegúrese de hacer y acudir a todas las citas, y llame a cai médico si está teniendo problemas. También es amish buena idea saber los resultados de emelia exámenes y mantener amish lista de los medicamentos que charles. ¿Cómo puede cuidarse en el hogar? · [employer] las viviane antes de manipular la sonda de nefrostomía. · Limpie la estrellita que rodea a la sonda con agua y [employer]. · Mantenga la bolsa de drenaje por debajo del nivel de los riñones para evitar que la orina retroceda. · Si le dijeron que puede volver a usar la bolsa, usted puede limpiar la bolsa después de desconectarla de la sonda. Utilice otro recipiente para recoger la orina mientras limpia la bolsa. Para limpiar la bolsa, llénela con Ezequiel Armando de 2 partes de vinagre y 3 partes de agua y déjela reposar kayode 20 minutos.  Sarah Ailyn y déjela secar al [place]. · Vacíe la bolsa de drenaje antes de que esté llena por completo, o cada 2 o 3 horas. · No nade ni tome quentin de inmersión mientras tenga colocada amish sonda de nefrostomía. Puede ducharse después de envolver el extremo de la sonda de nefrostomía con amish bolsa plástica. · Cámbiese el vendaje alrededor de la sonda de nefrostomía aproximadamente cada 3 días, o cuando se humedezca o se ensucie. Amish enfermera le enseñará a cambiarse el vendaje. ¿Cuándo debe pedir ayuda? Llame a cai médico ahora mismo o busque atención médica inmediata si:    · Se obstruye la sonda de nefrostomía.     · La sonda de nefrostomía tiene pérdidas (escapes).     · La orina no se recoge en la bolsa de drenaje.     · Tiene radha o pus en la orina.     · Tiene dolor de espalda kenisha debajo de las costillas. [place] se llama dolor en el flanco.     · Tiene fiebre, escalofríos o pete por todo el cuerpo.     · Tiene dolor en el abdomen o la sung.     · La orina está turbia o tiene olor desagradable.     · Tiene dolor o sangrado alrededor de la sonda.     · Tiene hinchazón alrededor de la sonda o en el abdomen. Preste especial atención a los cambios en cai linda y asegúrese de comunicarse con cai médico si:    · Necesita más ayuda para aprender a cuidar cai sonda de nefrostomía. ¿Dónde puede encontrar más información en inglés? Vaya a http://www.gray.com/  Arcenio K760 en la búsqueda para aprender más acerca de \"Cuidado de la sonda de nefrostomía: Instrucciones de cuidado. \"  Revisado: 17 diciembre, 2020               Versión del contenido: 12.8  © 6701-5158 Healthwise, [employer]. Las instrucciones de cuidado fueron adaptadas bajo licencia por [employer] (which disclaims liability or warranty for this information). Si usted tiene [place] afección médica o sobre estas instrucciones, siempre pregunte a cai profesional de linda.  [employer], [employer] niega toda garantía o responsabilidad por cai uso de esta información. Nephrostomy Tube Care: Care Instructions  Your Care Instructions     A nephrostomy tube is a thin catheter placed into your kidney to drain urine. You may have one tube in a kidney or two tubes, one in each kidney. The urine collects in a bag attached to the tube. In most cases, the bag is attached to your leg. Sometimes the catheter tube has a valve that lets you drain the urine into the toilet or other container. You may need a nephrostomy tube if you have a blockage or a hole in your urinary tract. The blockage may be caused by a kidney stone, infection, scar tissue, or a tumor. If you have only one tube, you still need to urinate. Your other kidney will still produce urine that will drain into your bladder. Having a nephrostomy tube in for a long time increases the risk of getting an infection. Nephrostomy tube care focuses on preventing infection. Follow-up care is a key part of your treatment and safety. Be sure to make and go to all appointments, and call your doctor if you are having problems. It's also a good idea to know your test results and keep a list of the medicines you take. How can you care for yourself at home? · Wash your hands before you handle the nephrostomy tube. · Clean the area around the tube with soap and water every day. · Keep the drainage bag lower than your kidney to keep urine from backing up. · If you have been told you can reuse your bag, you can clean the bag after removing it from the tube. Use another container to collect your urine while you clean the bag. To clean the bag, fill it with 2 parts vinegar to 3 parts water, and let it stand for 20 minutes. Then empty it out, and let it air dry. · Empty the drainage bag before it is completely full or every 2 to 3 hours. · Do not swim or take baths while you have a nephrostomy tube.  You can shower after wrapping the end of the nephrostomy tube with plastic wrap.  · Change the dressing around the nephrostomy tube about every 3 days or when it gets wet or dirty. A nurse will teach you how to change the dressing. When should you call for help? Call your doctor now or seek immediate medical care if:    · You have new or worse symptoms of a kidney infection. These may include:  ? Pain or burning when you urinate. ? A frequent need to urinate without being able to pass much urine. ? Pain in the flank, which is just below the rib cage and above the waist on either side of the back. ? Blood in the urine. ? A fever.     · You are vomiting or nauseated.     · Your tube leaks.     · Urine does not collect in the drainage bag.     · You have pain or bleeding around the tube. Watch closely for changes in your health, and be sure to contact your doctor if:    · You do not get better as expected. Where can you learn more? Go to http://www."[employer]/  Enter R405 in the search box to learn more about \"Nephrostomy Tube Care: Care Instructions. \"  Current as of: December 17, 2020               Content Version: 12.8  © 3834-5968 [email or web address]. Care instructions adapted under license by [employer] (which disclaims liability or warranty for this information). If you have questions about a medical condition or this instruction, always ask your healthcare professional. [place] [address] any warranty or liability for your use of this information.

## 2021-07-22 NOTE — H&P
Interventional and Vascular Radiology History and Physical    Patient: Francisca Hickman 50 y.o. female       Chief Complaint: No chief complaint on file. History of Present Illness: urinary obstruction     History:    Past Medical History:   Diagnosis Date    Cancer ([facility]     CERVICAL    Cervical cancer ([facility] 2019    Rheumatoid arthritis ([facility]      Family History   Problem Relation Age of Onset    No Known Problems Mother     Other Father         ACCIDENTAL DEATH    Thyroid Disease Brother     Anesth Problems Neg Hx      Social History     Socioeconomic History    Marital status:      Spouse name: Not on file    Number of children: Not on file    Years of education: Not on file    Highest education level: Not on file   Occupational History    Not on file   Tobacco Use    Smoking status: Former Smoker     Packs/day: 0.50     Years: 2.00     Pack years: 1.00     Quit date: 1991     Years since quittin.1    Smokeless tobacco: Never Used   Substance and Sexual Activity    Alcohol use: Yes     Comment: OCCASIONALLY 1/MONTH    Drug use: Never    Sexual activity: Not on file   Other Topics Concern    Not on file   Social History Narrative    Not on file     Social Determinants of Health     Financial Resource Strain:     Difficulty of Paying Living Expenses:    Food Insecurity:     Worried About Running Out of Food in the Last Year:     [address] in the Last Year:    Transportation Needs:     Lack of Transportation (Medical):      Lack of Transportation (Non-Medical):    Physical Activity:     Days of Exercise per Week:     Minutes of Exercise per Session:    Stress:     Feeling of Stress :    Social Connections:     Frequency of Communication with Friends and Family:     Frequency of Social Gatherings with Friends and Family:     Attends Latter day Services:     Active Member of Clubs or Organizations:     Attends Club or Organization Meetings:     Marital Status: Intimate Partner Violence:     Fear of Current or Ex-Partner:     Emotionally Abused:     Physically Abused:     Sexually Abused: Allergies: No Known Allergies    Current Medications:  No current facility-administered medications for this encounter. Physical Exam:  Blood pressure 112/82, pulse 70, temperature 98.3 °F (36.8 °C), resp. rate 16, height 5' 3\" (1.6 m), weight 69.4 kg (153 lb), SpO2 99 %, not currently breastfeeding. LUNG: clear to auscultation bilaterally, HEART: regular rate and rhythm, S1, S2 normal, no murmur, click, rub or gallop      Alerts:    Hospital Problems  Date Reviewed: 2/11/2021    None          Laboratory:    No results for input(s): HGB, HCT, WBC, PLT, INR, BUN, CREA, K, CRCLT, HGBEXT, HCTEXT, PLTEXT, INREXT in the last 72 hours. No lab exists for component: PTT, PT      Plan of Care/Planned Procedure:  Risks, benefits, and alternatives reviewed with patient and she agrees to proceed with the procedure. Conscious sedation will be performed with IV fentanyl and versed.  Plan is for left nephrostomy tube placement       Kimo Villarreal MD

## 2021-07-22 NOTE — PROGRESS NOTES
Pt tolerated left nephrostomy tube placement well and without complaint. Pt verbalized understanding of discharge and follow up instructions. Wound site is clean, dry and intact.

## 2021-07-22 NOTE — PROGRESS NOTES
Pt arrives ambulates to angio department accompanied by  for left nephrostomy tube placement procedure. All assessments completed and consent was reviewed. Education given was regarding procedure, moderate sedation, post-procedure care and  management/follow-up. Opportunity for questions was provided and all questions and concerns were addressed.

## 2021-08-10 ENCOUNTER — OFFICE VISIT (OUTPATIENT)
Dept: GYNECOLOGY | Age: 56
End: 2021-08-10
Payer: COMMERCIAL

## 2021-08-10 VITALS
WEIGHT: 152 LBS | HEART RATE: 83 BPM | BODY MASS INDEX: 26.93 KG/M2 | DIASTOLIC BLOOD PRESSURE: 76 MMHG | HEIGHT: 63 IN | SYSTOLIC BLOOD PRESSURE: 103 MMHG

## 2021-08-10 DIAGNOSIS — C53.8 MALIGNANT NEOPLASM OF OVERLAPPING SITES OF CERVIX (HCC): Primary | ICD-10-CM

## 2021-08-10 PROCEDURE — 99213 OFFICE O/P EST LOW 20 MIN: CPT | Performed by: OBSTETRICS & GYNECOLOGY

## 2021-08-10 NOTE — PROGRESS NOTES
[address] [place] [address] Mathias Moritz [address], [address]  P [phone]  F [phone]    Office Note  Patient ID:  Name:  Francisca Hickman  MRN:  [id number]  :  1965/55 y.o. Date:  8/10/2021      HISTORY OF PRESENT ILLNESS:  Francisca Hickman is a 50 y.o.  perimenopausal female who is an established patient who was referred to me for at least severe cervical dysplasia. She is referred by Dr. Jeff Reardon. Her most recent pap smear was read as HGSIL and she was high-risk HPV positive. She underwent subsequent colposcopy with biopsy. This revealed ZBIGNIEW 2-3, but an invasive process could not be excluded. On a pelvic ultrasound she was noted to have a complex 4.1 cm right adnexal mass and a 3.7 cm complex mass in the cervix. I have been asked to see her in consultation for further evaluation and management. I recommended an exam under anesthesia with cervical biopsy, cystoscopy, proctoscopy. This was performed on 19. Operative findings:  Replacement of cervix with carcinoma, with extension into proximal anterior vagina.  Parametrial thickening bilaterally.  Uterus still somewhat mobile.  No appreciable disease in the bladder or rectum. FINAL PATHOLOGIC DIAGNOSIS   Cervix, biopsy:   Invasive squamous cell carcinoma     Following the procedure and confirmation of the diagnosis of cancer, I sent her for a PET/CT. This demonstrated stage IVB disease. I explained that she has metastatic disease and she is not a candidate for surgical resection or curative radiation therapy. I discussed with them that the standard of care would be systemic chemotherapy, consisting of Taxol/Cisplatin/Avastin. If she has a good response, we may reconsider pelvic radiation for local control. It may also be necessary for control of bleeding, if that becomes a significant issue. She completed 8 cycles of Taxol/Cisplatin/Avastin chemotherapy and tolerated well.   She responded very well to treatment. The supraclavicular node had resolved completely and her retroperitoneal adenopathy had markedly improved. Her primary tumor had also shrunk significantly. I recommended pelvic radiation to provide local control. Since she lives in [facility] I referred her there for treatment. She subsequently was referred to radiation oncology at [facility] for brachytherapy. She completed all of her therapy in March 2020. She had not had any imaging since that time when she presented for follow-up. She reported some mild flank tenderness on the right, but nothing else. No vaginal bleeding, but some discharge. I sent her for a PET/CT to evaluate for persistent disease. There was no hypermetabolic activity remaining in the cervix or the previously identified nodes, however, there were two spots of activity in the left ovary. I recommended laparoscopic evaluation with BSO. She was taken to the OR on 6/22/20 for that procedure. Operative findings:  Normal appearing tubes and ovaries. Uterus mobile. Nothing suspicious for malignancy. Radiation changes noted in the pelvis. Fibrosis of the retroperitoneum. FINAL PATHOLOGIC DIAGNOSIS   1. 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 Ovary and fallopian tube, right salpingo-oophorectomy:   Serous cystadenoma   Comment   There are one or two metastatic deposits of squamous cell carcinoma within the parenchyma of the left ovary. No surface involvement is identified. CYTOLOGIC INTERPRETATION   Pelvic Washing, ThinPrep:   Benign mesothelial cells and mixed inflammatory cells   General Categorization   No cells diagnostic for malignancy   Specimen Adequacy   Satisfactory for evaluation     She presented for her postoperative examination. I reviewed the pathology with my partner, Dr. Ty Weston.   He and I both agreed that no additional treatment was indicated, however, she remains at risk for recurrence and will need close surveillance. I recommended repeating a CT scan in 3 months. Her CT in 2020 showed no evidence of disease. I recommended another follow-up CT in another three months. She presented recently for those results. Unfortunately it demonstrated likely recurrence, with left-sided hydronephrosis. I sent her for a PET/CT to evaluate the extent of disease. There did not appear to be disease other than left-sided paraaortic nodes. Due to the hydronephrosis, I referred her to urology for stent placement. She ultimately ended up with a nephrostomy. I referred her back to radiation oncology in [place] for targeted radiation therapy. She also saw medical oncology there are received concurrent cisplatin chemotherapy. This was completed in 2021. She had a follow-up PET/CT on 8/3/21 that revealed new disease. She presents today for consultation. ROS:   and GI review:  Negative  Cardiopulmonary review:  Negative   Musculoskeletal:  Negative    A comprehensive review of systems was negative except for that written in the History of Present Illness. , 10 point ROS      OB/GYN ROS:  There is no history of significant gyn problems or procedures.       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 ([address] 2019    Hydroureter, right 2019    Chemotherapy induced neutropenia ([address] 2019    Malignant neoplasm of overlapping sites of cervix ([address] 2019    Complex cyst of right ovary 2019    Cervical cancer ([address] 2019     PMH:  Past Medical History:   Diagnosis Date    Cancer [facility])     CERVICAL    Cervical cancer ([address] 2019    Rheumatoid arthritis (HCC)       PSH:  Past Surgical History:   Procedure Laterality Date    HX  SECTION      HX COLPOSCOPY 2019    HGSIL    IR NEPHROSTOMY PERC LT PLC CATH  SI  2021      Social History:  Social History     Tobacco Use    Smoking status: Former Smoker     Packs/day: 0.50     Years: 2.00     Pack years: 1.00     Quit date: 1991     Years since quittin.2    Smokeless tobacco: Never Used   Substance Use Topics    Alcohol use: Yes     Comment: OCCASIONALLY 1/MONTH      Family History:  Family History   Problem Relation Age of Onset    No Known Problems Mother     Other Father         ACCIDENTAL DEATH    Thyroid Disease Brother     Anesth Problems Neg Hx       Medications: (reviewed)  Current Outpatient Medications   Medication Sig    OTHER Probiotics    multivitamin (ONE A DAY) tablet Take 1 Tab by mouth daily.  vitamin e (E GEMS) 1,000 unit capsule Take 1,000 Units by mouth daily.  lidocaine-prilocaine (EMLA) topical cream Apply small amount over port area one hour before chemo treatment and cover with a Band-Aid    OTHER \"MANY VITAMINS AND SUPPLEMENTS\"    ibuprofen (ADVIL) 200 mg tablet Take 400 mg by mouth every eight (8) hours as needed for Pain.  ferrous sulfate 220 mg (44 mg iron)/5 mL solution Take  by mouth daily. (Patient not taking: Reported on 8/10/2021)     No current facility-administered medications for this visit. Allergies: (reviewed)  No Known Allergies       OBJECTIVE:    Physical Exam:  VITAL SIGNS: Vitals:    08/10/21 1306   BP: 103/76   Pulse: 83   Weight: 152 lb (68.9 kg)   Height: 5' 2.99\" (1.6 m)     Body mass index is 26.93 kg/m². GENERAL FAUSTINA: Conversant, alert, oriented. No acute distress. HEENT: HEENT. No thyroid enlargement. No JVD. Neck: Supple without restrictions. RESPIRATORY: Clear to auscultation and percussion to the bases. No CVAT. CARDIOVASC: RRR without murmur/rub.    GASTROINT: soft, non-tender, without masses or organomegaly   MUSCULOSKEL: no joint tenderness, deformity or swelling   EXTREMITIES: extremities normal, atraumatic, no cyanosis or edema   PELVIC: Deferred. RECTAL: Deferred. KEIRY SURVEY: No suspicious lymphadenopathy or edema noted. NEURO: Grossly intact. No acute deficit. Lab Data:    Lab Results   Component Value Date/Time    WBC 3.0 (L) 06/10/2020 11:26 AM    HGB 10.4 (L) 06/10/2020 11:26 AM    HCT 32.5 (L) 06/10/2020 11:26 AM    PLATELET 843 41/77/6501 11:26 AM    MCV 95.0 06/10/2020 11:26 AM     Lab Results   Component Value Date/Time    Sodium 141 06/10/2020 11:26 AM    Potassium 4.1 06/10/2020 11:26 AM    Chloride 107 06/10/2020 11:26 AM    CO2 28 06/10/2020 11:26 AM    Anion gap 6 06/10/2020 11:26 AM    Glucose 86 06/10/2020 11:26 AM    BUN 9 06/10/2020 11:26 AM    Creatinine 0.51 (L) 06/10/2020 11:26 AM    BUN/Creatinine ratio 18 06/10/2020 11:26 AM    GFR est AA >60 06/10/2020 11:26 AM    GFR est non-AA >60 06/10/2020 11:26 AM    Calcium 8.9 06/10/2020 11:26 AM       Pelvic MRI (2/27/20) - [facility]  Uterus:  No fibroids. Endometrium is grossly unremarkable. There is a Miah sleeve which terminates in the endometrial canal.     Cervix:  The cervix is small and contains a Nabothian cyst. There is abnormal signal in the patients cervix seen anteriorly and along the right side of the cervix. There is no large cervical mass. The anterior paracervical fat is hazy, it is unclear if this is related to treatment effect. Ovaries: There is a subcentimeter cyst of the right ovary the left ovary is mildly full but appears morphologically similar when compared with the prior exam.    Vagina:  Collapsed. Lymph nodes: There is persistent pathologic bilateral pelvic adenopathy, but it is improving. On the prior exam there was a right external iliac/obturator lymph node which measured at least 3.5 cm short axis and currently measures closer to 2.1 cm. There is a left external iliac chain lymph node which measured at least 2.2 cm short axis and currently measures approximately 1.4 cm short axis.  There is no worsening adenopathy. Of note, the field of view this exam does not extend above the aortic bifurcation. Bladder:  Collapsed and trabeculated. IMPRESSION:  1. Probable residual tumor along the right side and anterior portion of the cervix, as described above. Absence of similar prior exam and absence of postcontrast sequences does compromise comparison assessment. Interpretation was made within this caveat. 2.  Improving pelvic adenopathy. 3.  Remainder as above      PET/CT (5/28/20)  HEAD/NECK: No apparent foci of abnormal hypermetabolism. Cerebral evaluation is  limited by normal intense activity. Activity in the left supraclavicular lymph  node has resolved.     CHEST: No foci of abnormal hypermetabolism. Retrocrural adenopathy has resolved. Low-grade activity in the axilla lymph nodes is most likely reactive     ABDOMEN/PELVIS: Retroperitoneal activity has resolved. Pelvic adenopathy has  resolved. There is a calcified right pelvic sidewall lymph node.     There are 2 small foci of increased activity with SUV of 13.9 which which are  within the left ovary     SKELETON: No foci of abnormal hypermetabolism in the axial and visualized  appendicular skeleton.     IMPRESSION:   1. Increased metabolic activity in bilateral pelvic adenopathy, retroperitoneal  adenopathy and left supraclavicular adenopathy has resolved.     There are 2 small foci of increased metabolic activity which correspond to a  normal sized left ovary of uncertain significance and most likely are  physiologic although small metastatic foci are not entirely excluded and  continued close follow-up would be helpful. CT of neck (10/16/20)  There is no significant mass or adenopathy in the neck. Scattered small lymph nodes are in the internal jugular and spinal accessory chains bilaterally. None of the lymph nodes reach pathologic size criteria.     Bilateral parotid glands, submandibular glands and thyroid all appear unremarkable. Internal jugular veins, carotid arteries and vertebral arteries are all patent. Small central disc protrusion at the C4-5 level is present. No significant spinal stenosis or foraminal stenosis. No aggressive osseous lesion in the cervical spine. The skull base is unremarkable. There is mild mucosal thickening in the maxillary sinuses bilaterally. Tympanic cavities and mastoid air cells are well pneumatized. IMPRESSION:  1. No significant mass or adenopathy in the neck. 2. C4-5 level: Small central disc protrusion. CT of chest/abdomen/pelvis (10/16/20)  CHEST:  The lungs are clear. No pulmonary nodules or masses, atelectasis or, consolidation. No pleural or pericardial effusions. No significant adenopathy in the mediastinum, hilar, or axillary regions. Thoracic vertebral body heights are maintained, and there is no compression fracture. 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 Mild disc space narrowing with small anterior endplate osteophytes at several levels in the lower thoracic spine. No aggressive osseous lesion in the bony thorax. ABDOMEN:  No free peritoneal fluid or air, intra-abdominal abscess. No dilated loops of bowel, or evidence of any bowel obstruction. No appendicitis or diverticulitis. The liver is unremarkable. Portal veins patent. No biliary duct dilation. No inflammatory changes surrounding the gallbladder. Pancreas, spleen, adrenals and right kidney are normal.    Hydronephrosis of the left kidney is new compared with 11/22/2019 CT, and is due to a new soft tissue mass obstructing the proximal left ureter in the retroperitoneum (slice position 512.99). This mass has a CT density measurement of 60 Hounsfield units, and is 1.7 x 2.1 x 3.1 cm in AP, transverse and CC dimensions. It is suspect for representing a new metastatic lymph node.  The left ureter is not dilated distal to this mass, and there are no other sites of the left ureteral obstruction. There is no significant retroperitoneal adenopathy immediately adjacent to the aorta or inferior vena cava. The lumbar vertebral body heights are maintained, and there is no compression fracture. Degenerative disc bulge at the L5-S1 level with endplate osteophytes and moderate bilateral L5 foraminal stenosis. No aggressive osseous lesion in the lumbar spine. PELVIS:  A lymph node adjacent to the left external iliac vessels is 0.9 x 1.1 x 1.5 cm (slice position 851.70) decreased in size (previously 1.6 x 1.4 x 2.2 cm in same dimensions on 11/22/2019). A lymph node adjacent to the right external iliac vessels with peripheral calcifications is 2.3 x 1.8 x 3.7 cm, and is slightly decreased in size (previously 2.8 x 2.0 x 3.8 cm in same dimensions). No new pelvic adenopathy identified. Subtle stranding of the perirectal fat and ischiorectal fossa is slightly increased compared with 11/22/2019, and nonspecific in etiology. If the patient has had radiation, it may represent radiation change. The uterus is unremarkable. No free peritoneal fluid. Bony pelvis is unremarkable, without aggressive osseous lesions. IMPRESSION:  CHEST:  No acute process, metastatic disease, or worst significant change compared with 11/22/2019 CT. ABDOMEN:  1. New 3.1 cm soft tissue mass in the left side of the retroperitoneum of the upper abdomen causes obstruction of the right ureter and mild hydronephrosis. It is suspect for a new metastatic lymph node. 2. No other sites of the suspected metastatic disease in the upper abdomen. PELVIS:  1. Bilateral pelvic adenopathy has slightly decreased in size since 11/22/2019 CT. 2. Increased stranding of the perirectal fat without any focal mass is a nonspecific finding. If the patient has had prior radiation, it may represent radiation change. CT of chest/abdomen/pelvis (1/26/21)  CHEST:  Lungs: Mild bibasilar atelectasis.  No new consolidation. Few unchanged pulmonary nodules. For example:  *  Right middle lobe, 0.3 cm (slice position 475.96)  *  Lingula, 0.3 cm (slice position 220. 52)  Lymph nodes and Mediastinum: Unremarkable. Pleura: Unremarkable. Cardiovascular: Normal heart size with no pericardial effusion. Left chest wall Port-A-Cath with tip at the proximal superior vena cava. Other: Unchanged 0.8 x 0.7 cm subdermal/dermal soft tissue nodule within the posterior chest wall/back. ABDOMEN/PELVIS:  Liver: Unremarkable. Gallbladder and bile ducts: Unremarkable. Spleen, pancreas, adrenals: Unremarkable. Kidneys and ureters: Increased size of 2.8 x 2.3 x 3.6 cm soft tissue implant, previously measuring 2.1 x 1.7 x 3.1 cm, which obstructs the left proximal ureter. There is resultant now moderate to severe hydroureteronephrosis. There is minimally delayed left renal nephrogram.  Unremarkable right kidney. Bowel: Nondilated bowel with no wall thickening. Normal appendix. Bladder: Asymmetric enhancement within the urinary bladder, likely due to right right renal jet/excretion. Reproductive organs: Stable appearance of the uterus. Post surgical changes from bilateral salpingo-oophorectomy. Lymph nodes: Unchanged 2.5 x 2.1 cm peripherally calcified right external iliac chain node. Unchanged 1.2 x 1.1 cm left external iliac chain node. Peritoneum: Unchanged presacral soft tissue thickening, likely postradiation change. .  Vessels: Unremarkable. Abdominal wall: Unremarkable. MUSCULOSKELETAL:  No suspicious osseous lesions. IMPRESSION:  Since October 16, 2020, increased size of metastatic soft tissue implant obstructing the proximal left ureter with resultant moderate to severe hydroureteronephrosis. Minimally delayed left renal nephrogram as compared to the right. PET/CT (2/9/21)  HEAD/NECK: No apparent foci of abnormal hypermetabolism.  Cerebral evaluation is  limited by normal intense activity.     CHEST: No foci of abnormal hypermetabolism.     ABDOMEN/PELVIS: There are small but new mildly hypermetabolic left para-aortic  lymph nodes maximum SUV 8.1.     SKELETON: No foci of abnormal hypermetabolism in the axial and visualized  appendicular skeleton.     IMPRESSION  Small but new mildly hypermetabolic left para-aortic lymph nodes. Otherwise normal tracer distribution. PET/CT (8/3/21)  There is new hypermetabolic activity between the left first rib and clavicle without obvious correlate on CT. The location would be atypical for metastasis. Question if this could represent degenerative change from subtle articulation. Suggest attention on follow-up. .     There is mildly hypermetabolic activity in a morphologically normal, nonenlarged right axillary lymph node with a large fatty hilum demonstrating a max SUV of 2.8 on slice position -461.2, 0.1. Additional right axillary lymph nodes demonstrate low-grade tracer activity with max SUV of 2.5. Morphologically normal left axillary lymph nodes show low-grade tracer activity with max SUV up to 2.6. There is mildly hypermetabolic activity in the hieu with max SUV up to 3.2 bilaterally. Uncertain if this is vascular or reactive. There is at least 3 and possibly 4 hypermetabolic foci along the liver. The most hypermetabolic is along the lateral aspect of the anterior right hepatic lobe with a max SUV of 12 on slice position -018.50. These are concerning for tumor implants. 2 additional subtle foci are noted along the posterior right hepatic lobe on slice position -340.6 and -387. 21. There is an additional focus inferior to the left hepatic lobe on slice position -299.23 however it is uncertain if this could represent physiologic activity. These were not definitely seen on the prior PET/CT. Please note, the following discussion refers to the pelvis only PET/CT. There are scattered hypermetabolic soft tissue foci in the pelvis concerning for tumor implants.  A subcentimeter hypermetabolic focus anteriorly in the left upper pelvis and streak a max SUV of 8.6 on the pelvis the PET/CT on slice position -318. 63. There is an additional subcentimeter nodule adjacent to the proximal sigmoid colon with a max SUV of 10 on slice position -937. 33. Additional hypermetabolic focus in the left pelvis with a max SUV of 1.3 on SP -624. 14. Hypermetabolic focus posterior to the uterus with max SUV of 11 on slice position -531. 33. These may be new from the prior PET/CT. A hypermetabolic mass anterior to the left proximal ureter is difficult to measure though appears to measure about 1.6 x 2.1 cm on slice position -935.36 with a maximal SUV of 7.7. However it is uncertain if any of this activity is due to excreted tracer. This appeared to measure 2.1 x 2.6 cm on the outside PET/CT with a maximal SUV of 19 though comparison of SUVs from different scanner should be viewed with caution. There has been interval placement of a percutaneous left nephrostomy with ongoing moderate left hydronephrosis though excreted tracer is noted in the left renal collecting system and tubing. There is mildly hypermetabolic activity in nonenlarged right inguinal lymph nodes with a maximal SUV of 2 4.5. There is lower grade tracer activity in nonenlarged left inguinal lymph nodes with max SUV up to 2.4. No obvious hypermetabolic activity in the cervix though evaluation is limited by hypermetabolic activity in the adjacent urinary bladder. There is photopenia in the pelvis and lumbar spine presumably from radiation. There is fat stranding in the retroperitoneum and pelvis which somewhat compromises evaluation for enlarged lymph nodes. Otherwise, there is physiologic distribution of the radiopharmaceutical throughout the imaged structures with expected normal uptake in the imaged brain, myocardium, liver, spleen,  system and imaged bowel loops.      On the low dose, unenhanced CT scan performed for localization and attenuation correction purposes only, there is a right IJ Port-A-Cath terminating in the right atrium. Calcified density along the right pelvic sidewall as before. Mild perirectal fat stranding is presumably related to radiation. Uptake along the bilateral interstitial tuberosities is likely related to tendon insertion. IMPRESSION:   1. 3 and possibly 4 hypermetabolic foci along the periphery of the liver are suspected to be new from the prior PET/CT and are concerning for tumor implants. Scattered new subcentimeter tumor implants in the pelvis may also be new from the prior outside PET. These findings are concerning for progression. 2. The mass anterior to the left proximal ureter appears slightly smaller. Interval placement of a left percutaneous nephrostomy tube though with ongoing moderate left hydronephrosis. While excreted tracer is present in the left renal collecting system and along the tubing, given the ongoing moderate hydronephrosis, recommend correlation for nephrostomy function. 3. New mildly hypermetabolic activity in nonenlarged right inguinal lymph nodes and lower grade tracer activity in nonenlarged left inguinal lymph nodes are nonspecific. It is uncertain if these are reactive or malignant. 4. Mildly hypermetabolic activity in morphologically normal appearing bilateral axillary lymph nodes are also nonspecific and may be reactive. Suggest attention on follow-up. 5. New hypermetabolic activity between the left first rib and clavicle without obvious correlate on CT. The location would be atypical for metastasis. Question if this could represent degenerative change from subtle articulation. Suggest attention on follow-up. Inda Brittle IMPRESSION/PLAN:  Severo Huerta is a 50 y.o. 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 She was treated with 8 cycles of Taxol/Cistplatin/Avastin chemotherapy and had a great response.   She was then treated with pelvic radiation therapy, including brachytherapy. She has had an excellent response to her radiation therapy, with no obvious disease remaining on exam.  A subsequent PET demonstrated resolution of her previously noted disease, however, it found two spots of activity in the left ovary. She was then taken to the OR for [facility] BSO, revealing a focus of metastatic disease within the left ovary. I reviewed the pathology with my partner, Dr. Alex Farmer. He and I both agreed that no additional treatment was indicated, however, she was still at risk for recurrence and would need close surveillance. A subsequebt CT scan demonstrated a mass in the retroperitoneum causing left hydronephrosis, consistent with recurrent disease. A PET/CT at that time showed no other evidence of disease. I referred her back to radiation oncology in [place] for targeted radiation therapy. She also saw medical oncology there are received concurrent cisplatin chemotherapy. This was completed in April 2021. Her latest PET/CT demonstrates new metastatic disease in multiple locations. Dr. Nathalie Cooley recommended a biopsy of one of the liver lesions to confirm the diagnosis, as well as to send for PDL-1 testing. If she is positive, she would be a good candidate for Keytruda. I agree completely with this plan. I explained to her that this is exactly what I would do. Another additional option might be ablation of the liver lesions, as they do appear to be close to the surface and amenable to that. I recommended that she continue to get her treatment in [place] for her convenience. I explained that I remain available for consultation. An electronic signature was used to sign this note.     Sophie Espinoza MD  08/10/21

## 2021-08-18 ENCOUNTER — HOSPITAL ENCOUNTER (OUTPATIENT)
Dept: INTERVENTIONAL RADIOLOGY/VASCULAR | Age: 56
Discharge: HOME | End: 2021-08-18
Attending: UROLOGY | Admitting: STUDENT IN AN ORGANIZED HEALTH CARE EDUCATION/TRAINING PROGRAM
Payer: COMMERCIAL

## 2021-08-18 VITALS
OXYGEN SATURATION: 100 % | BODY MASS INDEX: 26.93 KG/M2 | RESPIRATION RATE: 18 BRPM | SYSTOLIC BLOOD PRESSURE: 98 MMHG | DIASTOLIC BLOOD PRESSURE: 86 MMHG | HEIGHT: 63 IN | TEMPERATURE: 98.1 F | WEIGHT: 152 LBS | HEART RATE: 82 BPM

## 2021-08-18 DIAGNOSIS — N13.30 HYDRONEPHROSIS: ICD-10-CM

## 2021-08-18 PROCEDURE — 74011000636 HC RX REV CODE- 636: Performed by: UROLOGY

## 2021-08-18 PROCEDURE — 50431 NJX PX NFROSGRM &/URTRGRM: CPT

## 2021-08-18 RX ADMIN — IOPAMIDOL 10 ML: 612 INJECTION, SOLUTION INTRAVENOUS at 14:21

## 2021-08-18 NOTE — PROGRESS NOTES
1315: Pt arrives ambulatory to angio department accompanied by  for nephrostogram and possible neph tube removal procedure. All assessments completed and consent was reviewed. Education given was regarding procedure, no sedation, post-procedure care and  management/follow-up. Opportunity for questions was provided and all questions and concerns were addressed. 1430: Provider not able to remove nephrostomy tube at this time. Nephrostomy tube dressing changed and new bag attached. Patient ambulatory out of procedure room to angio bay. Patient upset but in NAD. 1447: Patient given copy of dc instructions. Patient alert and oriented and in no acute distress. Patient offered wheelchair from treatment area to hospital entrance, patient brought to lobby in wheelchair.

## 2021-08-18 NOTE — PROCEDURES
Interventional Radiology  Procedure Note        8/18/2021 2:27 PM    Patient: Red Ro     Informed consent obtained    Diagnosis: Left hydronephrosis; cervical cancer    Procedure(s): Antegrade nephrostogram through existing left nephrostomy tube, showing occlusion of the left ureteropelvic junction. No antegrade flow. Nephrostomy tube was left in place.     Specimens removed:  none    Complications: None    Primary Physician: Dave Zepeda MD    Recommendations: N/A    Discharge Disposition: Stable; discharge to home    Full dictated report to follow    Dave Zepeda MD  Interventional Radiology  [facility]  2:27 PM, 8/18/2021

## 2022-02-25 ENCOUNTER — TELEPHONE (OUTPATIENT)
Dept: GYNECOLOGY | Age: 57
End: 2022-02-25

## 2022-02-25 DIAGNOSIS — Z79.899 ON ANTINEOPLASTIC CHEMOTHERAPY: ICD-10-CM

## 2022-02-25 DIAGNOSIS — C53.8 MALIGNANT NEOPLASM OF OVERLAPPING SITES OF CERVIX (HCC): Primary | ICD-10-CM

## 2022-03-03 ENCOUNTER — TELEPHONE (OUTPATIENT)
Dept: RHEUMATOLOGY | Age: 57
End: 2022-03-03

## 2022-03-03 NOTE — TELEPHONE ENCOUNTER
Call patient received referral from referring provider's office Edmundo Mcclellan advise patient physician schedule is out until Jan 2023 patient stated he would call back to the referring provider's office to inform patient referral info has been scan into system.  sdh

## 2022-03-18 PROBLEM — C53.9 CERVICAL CANCER (HCC): Status: ACTIVE | Noted: 2019-05-01

## 2022-03-18 PROBLEM — N83.291 COMPLEX CYST OF RIGHT OVARY: Status: ACTIVE | Noted: 2019-05-19

## 2022-03-19 PROBLEM — D70.1 CHEMOTHERAPY INDUCED NEUTROPENIA (HCC): Status: ACTIVE | Noted: 2019-06-06

## 2022-03-19 PROBLEM — N13.4 HYDROURETER, RIGHT: Status: ACTIVE | Noted: 2019-07-03

## 2022-03-19 PROBLEM — T45.1X5A CHEMOTHERAPY INDUCED NEUTROPENIA (HCC): Status: ACTIVE | Noted: 2019-06-06

## 2022-03-19 PROBLEM — C53.8 MALIGNANT NEOPLASM OF OVERLAPPING SITES OF CERVIX (HCC): Status: ACTIVE | Noted: 2019-05-20

## 2022-03-19 PROBLEM — Z79.899 ON ANTINEOPLASTIC CHEMOTHERAPY: Status: ACTIVE | Noted: 2019-07-03

## 2022-03-20 PROBLEM — C77.9 METASTATIC SQUAMOUS CELL CARCINOMA TO LYMPH NODE (HCC): Status: ACTIVE | Noted: 2019-07-03

## 2023-05-23 RX ORDER — IBUPROFEN 200 MG
400 TABLET ORAL EVERY 8 HOURS PRN
COMMUNITY

## 2023-05-23 RX ORDER — LIDOCAINE AND PRILOCAINE 25; 25 MG/G; MG/G
CREAM TOPICAL
COMMUNITY
Start: 2019-06-07

## 2023-05-23 RX ORDER — FERROUS SULFATE 220 (44)/5
ELIXIR ORAL DAILY
COMMUNITY

## 2023-05-23 RX ORDER — MULTIVIT WITH MINERALS/LUTEIN
1000 TABLET ORAL DAILY
COMMUNITY

## 2023-09-22 NOTE — PROGRESS NOTES
[address] [place] [address] [address], [address]  P [phone]  F [phone]    Office Note  Patient ID:  Name:  Elkin Loja  MRN:  [id number]  :  1965/53 y.o. Date:  2019      HISTORY OF PRESENT ILLNESS:  Elkin Loja is a 48 y.o.  perimenopausal female who is being seen for at least sever cervical dysplasia. She is referred by Dr. Shalini Chavarria. Her most recent pap smear was read as HGSIL and she was high-risk HPV positive. She underwent subsequent colposcopy with biopsy. This revealed ZBIGNIEW 2-3, but an invasive process could not be excluded. On a pelvic ultrasound she was noted to have a complex 4.1 cm right adnexal mass and a 3.7 cm complex mass in the cervix. I have been asked to see her in consultation for further evaluation and management. I recommended an exam under anesthesia with cervical biopsy, cystoscopy, proctoscopy. This was performed on 19. Operative findings:  Replacement of cervix with carcinoma, with extension into proximal anterior vagina.  Parametrial thickening bilaterally.  Uterus still somewhat mobile.  No appreciable disease in the bladder or rectum. FINAL PATHOLOGIC DIAGNOSIS   Cervix, biopsy:   Invasive squamous cell carcinoma   Comment   The case is also seen by Dr. Fidencio ePdraza who concurs with the findings. The results are communicated with Dr. Isabel Odonnell nurse, Martita Cerrato at approximately 1:30pm on 2019. Following the procedure and confirmation of the diagnosis of cancer, I sent her for a PET/CT. This demonstrated stage IVB disease. I explained that she has metastatic disease and she is not a candidate for surgical resection or curative radiation therapy. I discussed with them that the standard of care would be systemic chemotherapy, consisting of Taxol/Cisplatin/Avastin. If she has a good response, we may reconsider pelvic radiation for local control.   It may also be necessary for control of bleeding, Patient from surgery. Patient pain managed with scheduled tylenol. Indwelling desai with good UOP. Patient up and ambulating in halls, walked 2 laps this evening. Family at bedside. Patient has 6 lap sites with dermabond. Patient personal items and call light within reach. Safety measures in place. Problem: Patient Centered Care  Goal: Patient preferences are identified and integrated in the patient's plan of care  Description: Interventions:  - What would you like us to know as we care for you? I have my whole family with me today  - Provide timely, complete, and accurate information to patient/family  - Incorporate patient and family knowledge, values, beliefs, and cultural backgrounds into the planning and delivery of care  - Encourage patient/family to participate in care and decision-making at the level they choose  - Honor patient and family perspectives and choices  9/22/2023 1853 by Lieutenant Ladi RN  Outcome: Progressing  9/22/2023 1851 by Lieutenant Ladi RN  Outcome: Progressing     Problem: Patient/Family Goals  Goal: Patient/Family Long Term Goal  Description: Patient's Long Term Goal: patient would like to be free from pain    Interventions:  - medications given per orders  - See additional Care Plan goals for specific interventions  9/22/2023 1853 by Lieutenant Ladi RN  Outcome: Progressing  9/22/2023 1851 by Lieutenant Ladi RN  Outcome: Progressing  Goal: Patient/Family Short Term Goal  Description: Patient's Short Term Goal: Patient would like to begin eating. Interventions:   - Advance diet as tolerated per orders.  - See additional Care Plan goals for specific interventions  9/22/2023 1853 by Lieutenant Ladi RN  Outcome: Progressing  9/22/2023 1851 by Lieutenant Ladi RN  Outcome: Progressing     Problem: Patient/Family Goals  Goal: Patient/Family Short Term Goal  Description: Patient's Short Term Goal: Patient would like to go home soon.     Interventions:   - continue if that becomes a significant issue. She is currently receiving Taxol/Cisplatin/Avastin chemotherapy. She has completed 3 cycles so far. She is tolerating well. Her appetite is good and she has minimal nausea. Her pain is better and she reports that the bleeding has stopped. She reports a sore throat and hoarseness after chemo. She presents today to review her newest CT scan. ROS:   and GI review:  Negative  Cardiopulmonary review:  Negative   Musculoskeletal:  Negative    A comprehensive review of systems was negative except for that written in the History of Present Illness. , 10 point ROS      OB/GYN ROS:  There is no history of significant gyn problems or procedures.       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 ([address] 2019    Hydroureter, right 2019    Chemotherapy induced neutropenia ([address] 2019    Malignant neoplasm of overlapping sites of cervix ([address] 2019    Complex cyst of right ovary 2019     PMH:  Past Medical History:   Diagnosis Date    Cancer ([address] 2019    CEVICAL    Rheumatoid arthritis ([address]       PSH:  Past Surgical History:   Procedure Laterality Date    HX  SECTION  1997    HX COLPOSCOPY  2019    HGSIL      Social History:  Social History     Tobacco Use    Smoking status: Former Smoker     Packs/day: 0.50     Years: 2.00     Pack years: 1.00     Last attempt to quit: 1991     Years since quittin.2    Smokeless tobacco: Never Used   Substance Use Topics    Alcohol use: Yes     Comment: OCCASIONALLY      Family History:  Family History   Problem Relation Age of Onset    No Known Problems Mother     Other Father         ACCIDENTAL DEATH    Thyroid Disease Brother     Anesth Problems Neg Hx       Medications: (reviewed)  Current Outpatient Medications   Medication Sig    ondansetron plan of care until discharge.   - See additional Care Plan goals for specific interventions  9/22/2023 1853 by Iliana Ervin RN  Outcome: Progressing  9/22/2023 1851 by Iliana Ervin RN  Outcome: Progressing (ZOFRAN ODT) 4 mg disintegrating tablet Take 1 Tab by mouth every six (6) hours as needed for Nausea. Indications: Nausea and Vomiting caused by Cancer Drugs    lidocaine-prilocaine (EMLA) topical cream Apply small amount over port area one hour before chemo treatment and cover with a Band-Aid    dexamethasone (DECADRON) 4 mg tablet Take 2 tablets by mouth with breakfast the day before chemo also take 2 tablets with breakfast for 2 days after chemotherapy    OTHER \"MANY VITAMINS AND SUPPLEMENTS\"    naproxen (NAPROSYN) 500 mg tablet TAKE 1 TABLET BY MOUTH EVERY 8 HOURS FOR 48 HOURS. TAKING MED AS NEEDED    ibuprofen (ADVIL) 200 mg tablet Take 400 mg by mouth every eight (8) hours as needed for Pain. No current facility-administered medications for this visit. Allergies: (reviewed)  No Known Allergies       OBJECTIVE:    Physical Exam:  VITAL SIGNS: Vitals:    08/13/19 1011   BP: 105/79   Pulse: 82   Weight: 176 lb 3.2 oz (79.9 kg)   Height: 5' 2.99\" (1.6 m)     Body mass index is 31.22 kg/m². GENERAL FAUSTINA: Conversant, alert, oriented. No acute distress. HEENT: HEENT. No thyroid enlargement. No JVD. Neck: Supple without restrictions. RESPIRATORY: Clear to auscultation and percussion to the bases. No CVAT. CARDIOVASC: RRR without murmur/rub. GASTROINT: soft, non-tender, without masses or organomegaly   MUSCULOSKEL: no joint tenderness, deformity or swelling   EXTREMITIES: extremities normal, atraumatic, no cyanosis or edema   PELVIC: Deferred   RECTAL: Deferred   KEIRY SURVEY: No suspicious lymphadenopathy or edema noted. NEURO: Grossly intact. No acute deficit.        Lab Data:    Lab Results   Component Value Date/Time    WBC 4.4 07/23/2019 12:59 PM    HGB 11.7 07/23/2019 12:59 PM    HCT 36.7 07/23/2019 12:59 PM    PLATELET 503 47/35/3044 12:59 PM    MCV 88 07/23/2019 12:59 PM     Lab Results   Component Value Date/Time    Sodium 140 07/23/2019 12:59 PM    Potassium 4.6 07/23/2019 12:59 PM Chloride 100 07/23/2019 12:59 PM    CO2 27 07/23/2019 12:59 PM    Anion gap 4 (L) 07/03/2019 08:40 AM    Glucose 65 07/23/2019 12:59 PM    BUN 11 07/23/2019 12:59 PM    Creatinine 0.52 (L) 07/23/2019 12:59 PM    BUN/Creatinine ratio 21 07/23/2019 12:59 PM    GFR est  07/23/2019 12:59 PM    GFR est non- 07/23/2019 12:59 PM    Calcium 9.3 07/23/2019 12:59 PM         Imaging: Outside pelvic ultrasound reviewed. Pertinent findings noted in HPI.         PET/CT (5/21/19)  HEAD/NECK: There is 1.8 cm left level 4 lymph node with increased metabolic  activity of 11.     CHEST: No foci of abnormal hypermetabolism. Low-grade activity in a normal right  axillary lymph node is most likely physiologic.     ABDOMEN/PELVIS: There is a 1 cm retrocrural lymph node on the right with  increased metabolic activity of 5.      There is periaortic adenopathy measuring 2 cm with increased metabolic activity  of 9.     There is an enlarged lymph node at the aortic bifurcation with increased  metabolic activity.       There is a complex appearing mass right pelvic sidewall measuring 3.6 cm with  increased metabolic activity of 87.1. There is left iliac adenopathy with increased metabolic activity of 5.7.     There is a 1.4 cm soft tissue nodule intraperitoneal left lower quadrant with  increased metabolic activity of 7.     There is a mass in the cervix with increased metabolic activity of 12  There is right hydronephrosis.     SKELETON: No foci of abnormal hypermetabolism in the axial and visualized  appendicular skeleton.     IMPRESSION:   There is increased metabolic activity in a cervical mass consistent with  known primary neoplasm. There is adenopathy involving right pelvic sidewall, left iliac chain,  para-aortic chain, intra-abdominal nodule left lower quadrant, retrocrural lymph  node and left supra clavicular lymph node with increased metabolic activity  consistent with metastatic disease.  The cervical mass appears to be obstructing  the right ureter resulting in moderate right hydroureteronephrosis. CT of chest/abdomen/pelvis (8/13/19)  CT chest:    With a left chest Port-A-Cath terminates in the SVC. The visualized thyroid  gland is unremarkable. The aorta and main pulmonary artery are normal in  caliber. The heart size is normal.  There is no pericardial or pleural effusion.        There are no enlarged axillary, mediastinal, or hilar lymph nodes.      There is no lung mass or airspace opacity. There is no pneumothorax. The  central airways are clear.     CT abdomen and pelvis: The liver, spleen, pancreas, and adrenal glands are normal. The gall bladder is  present  without intra- or extra-hepatic biliary dilatation.       The kidneys are symmetric without hydronephrosis.      There are no dilated bowel loops. The appendix is normal.       There are no enlarged lymph nodes. There is no free fluid or free air. The  aorta tapers without aneurysm.     The urinary bladder is normal.  There is no pelvic mass. Dominant bilateral  ovarian follicles are noted.     There is degenerative change at L5-S1. There is no aggressive bony lesion.     IMPRESSION:   No evidence for metastatic disease or other acute abnormality in the chest,  abdomen, or pelvis. IMPRESSION/PLAN:  Dagoberto Garcia is a 48 y.o. 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 We previously discussed the pathology and I reviewed the PET images with her and her . I explained that she has metastatic disease and she is not a candidate for surgical resection or curative radiation therapy. I discussed with them that the standard of care would be systemic chemotherapy, consisting of Taxol/Cisplatin/Avastin. She was counseled on the expected side effects and potential toxicities of the regimen. Her questions were answered and she wished to proceed.   If she has a good response, we may reconsider pelvic radiation for local control. It may also be necessary for control of bleeding, if that becomes a significant issue. She is currently receiving Taxol/Cisplatin/Avastin chemotherapy. She has completed 3 cycles. She is tolerating well so far. Her scan looks good. Her malgorzata disease is significantly better and her cervix is essentially normal in appearance on imaging. I reviewed the images with her and her . I recommend continuing with the current regimen and repeating a scan after 2-3 cycles.           Signed By: Emily Hines MD     8/13/2019/9:50 AM

## (undated) DEVICE — SYR 10ML LUER LOK 1/5ML GRAD --

## (undated) DEVICE — GAUZE,SPONGE,2"X2",8PLY,STERILE,LF,2'S: Brand: MEDLINE

## (undated) DEVICE — GLOVE SURG SZ 75 L1212IN FNGR THK138MIL BRN LTX FREE

## (undated) DEVICE — TOTAL TRAY, DB, 100% SILI FOLEY, 16FR 10: Brand: MEDLINE

## (undated) DEVICE — COVER LT HNDL PLAS RIG 1 PER PK

## (undated) DEVICE — STRAP,POSITIONING,KNEE/BODY,FOAM,4X60": Brand: MEDLINE

## (undated) DEVICE — NEEDLE HYPO 25GA L1.5IN BVL ORIENTED ECLIPSE

## (undated) DEVICE — SOLUTION IV 1000ML 0.9% SOD CHL

## (undated) DEVICE — 3M™ TEGADERM™ TRANSPARENT FILM DRESSING FRAME STYLE, 1626W, 4 IN X 4-3/4 IN (10 CM X 12 CM), 50/CT 4CT/CASE: Brand: 3M™ TEGADERM™

## (undated) DEVICE — Device

## (undated) DEVICE — SURGICAL PROCEDURE PACK BASIN MAJ SET CUST NO CAUT

## (undated) DEVICE — SUTURE SZ 0 27IN 5/8 CIR UR-6  TAPER PT VIOLET ABSRB VICRYL J603H

## (undated) DEVICE — INFECTION CONTROL KIT SYS

## (undated) DEVICE — TROCAR: Brand: KII® SLEEVE

## (undated) DEVICE — STRAP POS KNEE BODY VELC

## (undated) DEVICE — PREP SKN CHLRAPRP APL 26ML STR --

## (undated) DEVICE — STERILE POLYISOPRENE POWDER-FREE SURGICAL GLOVES WITH EMOLLIENT COATING: Brand: PROTEXIS

## (undated) DEVICE — HANDLE LT SNAP ON ULT DURABLE LENS FOR TRUMPF ALC DISPOSABLE

## (undated) DEVICE — INTENDED FOR TISSUE SEPARATION, AND OTHER PROCEDURES THAT REQUIRE A SHARP SURGICAL BLADE TO PUNCTURE OR CUT.: Brand: BARD-PARKER ® CARBON RIB-BACK BLADES

## (undated) DEVICE — 40418 TRENDELENBURG ONE-STEP ARM PROTECTORS LARGE (1 PAIR): Brand: 40418 TRENDELENBURG ONE-STEP ARM PROTECTORS LARGE (1 PAIR)

## (undated) DEVICE — SOLUTION IV 500ML 0.9% SOD CHL FLX CONT

## (undated) DEVICE — 40580 - THE PINK PAD - ADVANCED TRENDELENBURG POSITIONING KIT: Brand: 40580 - THE PINK PAD - ADVANCED TRENDELENBURG POSITIONING KIT

## (undated) DEVICE — GARMENT,MEDLINE,DVT,INT,CALF,MED, GEN2: Brand: MEDLINE

## (undated) DEVICE — TROCAR: Brand: KII® OPTICAL ACCESS SYSTEM

## (undated) DEVICE — PACK,LITHOTOMY,PK I: Brand: MEDLINE

## (undated) DEVICE — DERMABOND SKIN ADH 0.7ML -- DERMABOND ADVANCED 12/BX

## (undated) DEVICE — Z DISCONTINUEDSOLUTION PREP 2OZ 10% POVIDONE IOD SCR CAP BTL

## (undated) DEVICE — 1200 GUARD II KIT W/5MM TUBE W/O VAC TUBE: Brand: GUARDIAN

## (undated) DEVICE — STERILE POLYISOPRENE POWDER-FREE SURGICAL GLOVES: Brand: PROTEXIS

## (undated) DEVICE — BAG SPEC REM 224ML W4XL6IN DIA10MM 1 HND GYN DISP ENDOPCH

## (undated) DEVICE — CYSTO/BLADDER IRRIGATION SET, REGULATING CLAMP

## (undated) DEVICE — PAD,SANITARY,11 IN,MAXI,N-STRL,IND WRAP: Brand: MEDLINE

## (undated) DEVICE — DRAPE,REIN 53X77,STERILE: Brand: MEDLINE

## (undated) DEVICE — SUTURE VCRL SZ 3-0 L27IN ABSRB UD L26MM SH 1/2 CIR J416H

## (undated) DEVICE — SURGICAL PROCEDURE PACK GYN LAPAROSCOPY CUST SMH LF

## (undated) DEVICE — BLADE ASSEMB CLP HAIR FINE --

## (undated) DEVICE — TRAY PREP DRY W/ PREM GLV 2 APPL 6 SPNG 2 UNDPD 1 OVERWRAP

## (undated) DEVICE — STRIP SKIN CLSR W0.25XL4IN WHT SPUNBOUND FBR NYL HI ADH

## (undated) DEVICE — REM POLYHESIVE ADULT PATIENT RETURN ELECTRODE: Brand: VALLEYLAB

## (undated) DEVICE — SWAB MEDICATED 8 IN PROCTO

## (undated) DEVICE — SYR 50ML LR LCK 1ML GRAD NSAF --

## (undated) DEVICE — SUTURE MCRYL SZ 4-0 L27IN ABSRB UD L19MM PS-2 1/2 CIR PRIM Y426H

## (undated) DEVICE — SUTURE PROL SZ 2-0 L36IN NONABSORBABLE BLU SH L26MM 1/2 CIR 8523H

## (undated) DEVICE — MASTISOL ADHESIVE LIQ 2/3ML

## (undated) DEVICE — DRAPE,LAPAROTOMY,PED,STERILE: Brand: MEDLINE

## (undated) DEVICE — SHEARS ENDOSCP L36CM DIA5MM ULTRASONIC CRV TIP W/ ADV

## (undated) DEVICE — LAPAROSCOPIC TROCAR SLEEVE/SINGLE USE: Brand: KII® OPTICAL ACCESS SYSTEM

## (undated) DEVICE — DRAPE XR C ARM 41X74IN LF --

## (undated) DEVICE — Z INACTIVE USE 2527070 DRAPE SURG W40XL44IN UNDERBUTTOCK SMS POLYPR W/ PCH BK DISP

## (undated) DEVICE — TISSUE RETRIEVAL SYSTEM: Brand: INZII RETRIEVAL SYSTEM

## (undated) DEVICE — SOLUTION IRRIGATION H2O 0797305] ICU MEDICAL INC]

## (undated) DEVICE — GOWN,SIRUS,FABRNF,XL,20/CS: Brand: MEDLINE